# Patient Record
Sex: MALE | Race: BLACK OR AFRICAN AMERICAN | Employment: OTHER | URBAN - METROPOLITAN AREA
[De-identification: names, ages, dates, MRNs, and addresses within clinical notes are randomized per-mention and may not be internally consistent; named-entity substitution may affect disease eponyms.]

---

## 2021-11-17 ENCOUNTER — HOSPITAL ENCOUNTER (INPATIENT)
Age: 81
LOS: 6 days | Discharge: HOME | DRG: 329 | End: 2021-11-24
Attending: GENERAL PRACTICE | Admitting: SURGERY
Payer: MEDICARE

## 2021-11-17 ENCOUNTER — APPOINTMENT (OUTPATIENT)
Dept: CT IMAGING | Age: 81
DRG: 329 | End: 2021-11-17
Payer: MEDICARE

## 2021-11-17 DIAGNOSIS — K26.5 DUODENAL ULCER PERFORATION (HCC): Primary | ICD-10-CM

## 2021-11-17 LAB
ALBUMIN SERPL-MCNC: 4.4 G/DL (ref 3.5–5.2)
ALP BLD-CCNC: 44 U/L (ref 40–129)
ALT SERPL-CCNC: 21 U/L (ref 0–40)
ANION GAP SERPL CALCULATED.3IONS-SCNC: 17 MMOL/L (ref 7–16)
AST SERPL-CCNC: 22 U/L (ref 0–39)
BACTERIA: ABNORMAL /HPF
BASOPHILS ABSOLUTE: 0.01 E9/L (ref 0–0.2)
BASOPHILS RELATIVE PERCENT: 0.1 % (ref 0–2)
BILIRUB SERPL-MCNC: 0.6 MG/DL (ref 0–1.2)
BILIRUBIN DIRECT: 0.2 MG/DL (ref 0–0.3)
BILIRUBIN URINE: NEGATIVE
BILIRUBIN, INDIRECT: 0.4 MG/DL (ref 0–1)
BLOOD, URINE: ABNORMAL
BUN BLDV-MCNC: 24 MG/DL (ref 6–23)
CALCIUM SERPL-MCNC: 9.8 MG/DL (ref 8.6–10.2)
CHLORIDE BLD-SCNC: 91 MMOL/L (ref 98–107)
CLARITY: CLEAR
CO2: 27 MMOL/L (ref 22–29)
COLOR: YELLOW
CREAT SERPL-MCNC: 1.3 MG/DL (ref 0.7–1.2)
EOSINOPHILS ABSOLUTE: 0 E9/L (ref 0.05–0.5)
EOSINOPHILS RELATIVE PERCENT: 0 % (ref 0–6)
GFR AFRICAN AMERICAN: >60
GFR NON-AFRICAN AMERICAN: >60 ML/MIN/1.73
GLUCOSE BLD-MCNC: 347 MG/DL (ref 74–99)
GLUCOSE URINE: 250 MG/DL
HCT VFR BLD CALC: 39 % (ref 37–54)
HEMOGLOBIN: 12.3 G/DL (ref 12.5–16.5)
IMMATURE GRANULOCYTES #: 0.05 E9/L
IMMATURE GRANULOCYTES %: 0.4 % (ref 0–5)
KETONES, URINE: NEGATIVE MG/DL
LACTIC ACID, SEPSIS: 2.9 MMOL/L (ref 0.5–1.9)
LACTIC ACID, SEPSIS: 3 MMOL/L (ref 0.5–1.9)
LEUKOCYTE ESTERASE, URINE: NEGATIVE
LIPASE: 16 U/L (ref 13–60)
LYMPHOCYTES ABSOLUTE: 0.6 E9/L (ref 1.5–4)
LYMPHOCYTES RELATIVE PERCENT: 5.1 % (ref 20–42)
MCH RBC QN AUTO: 22.9 PG (ref 26–35)
MCHC RBC AUTO-ENTMCNC: 31.5 % (ref 32–34.5)
MCV RBC AUTO: 72.5 FL (ref 80–99.9)
MONOCYTES ABSOLUTE: 1 E9/L (ref 0.1–0.95)
MONOCYTES RELATIVE PERCENT: 8.4 % (ref 2–12)
NEUTROPHILS ABSOLUTE: 10.22 E9/L (ref 1.8–7.3)
NEUTROPHILS RELATIVE PERCENT: 86 % (ref 43–80)
NITRITE, URINE: NEGATIVE
PDW BLD-RTO: 14.7 FL (ref 11.5–15)
PH UA: 5 (ref 5–9)
PLATELET # BLD: 220 E9/L (ref 130–450)
PMV BLD AUTO: 10 FL (ref 7–12)
POTASSIUM REFLEX MAGNESIUM: 4.4 MMOL/L (ref 3.5–5)
PROTEIN UA: 100 MG/DL
RBC # BLD: 5.38 E12/L (ref 3.8–5.8)
RBC UA: ABNORMAL /HPF (ref 0–2)
SODIUM BLD-SCNC: 135 MMOL/L (ref 132–146)
SPECIFIC GRAVITY UA: 1.02 (ref 1–1.03)
TOTAL PROTEIN: 7.8 G/DL (ref 6.4–8.3)
UROBILINOGEN, URINE: 0.2 E.U./DL
WBC # BLD: 11.9 E9/L (ref 4.5–11.5)
WBC UA: ABNORMAL /HPF (ref 0–5)

## 2021-11-17 PROCEDURE — 96376 TX/PRO/DX INJ SAME DRUG ADON: CPT

## 2021-11-17 PROCEDURE — 83690 ASSAY OF LIPASE: CPT

## 2021-11-17 PROCEDURE — 81001 URINALYSIS AUTO W/SCOPE: CPT

## 2021-11-17 PROCEDURE — 6360000002 HC RX W HCPCS: Performed by: GENERAL PRACTICE

## 2021-11-17 PROCEDURE — 2580000003 HC RX 258: Performed by: STUDENT IN AN ORGANIZED HEALTH CARE EDUCATION/TRAINING PROGRAM

## 2021-11-17 PROCEDURE — 96365 THER/PROPH/DIAG IV INF INIT: CPT

## 2021-11-17 PROCEDURE — 93005 ELECTROCARDIOGRAM TRACING: CPT | Performed by: GENERAL PRACTICE

## 2021-11-17 PROCEDURE — 80076 HEPATIC FUNCTION PANEL: CPT

## 2021-11-17 PROCEDURE — 87088 URINE BACTERIA CULTURE: CPT

## 2021-11-17 PROCEDURE — 80048 BASIC METABOLIC PNL TOTAL CA: CPT

## 2021-11-17 PROCEDURE — 2500000003 HC RX 250 WO HCPCS: Performed by: GENERAL PRACTICE

## 2021-11-17 PROCEDURE — 6360000004 HC RX CONTRAST MEDICATION: Performed by: STUDENT IN AN ORGANIZED HEALTH CARE EDUCATION/TRAINING PROGRAM

## 2021-11-17 PROCEDURE — 96366 THER/PROPH/DIAG IV INF ADDON: CPT

## 2021-11-17 PROCEDURE — 2580000003 HC RX 258: Performed by: GENERAL PRACTICE

## 2021-11-17 PROCEDURE — 85025 COMPLETE CBC W/AUTO DIFF WBC: CPT

## 2021-11-17 PROCEDURE — 99284 EMERGENCY DEPT VISIT MOD MDM: CPT

## 2021-11-17 PROCEDURE — 36415 COLL VENOUS BLD VENIPUNCTURE: CPT

## 2021-11-17 PROCEDURE — 96368 THER/DIAG CONCURRENT INF: CPT

## 2021-11-17 PROCEDURE — 74177 CT ABD & PELVIS W/CONTRAST: CPT

## 2021-11-17 PROCEDURE — 96375 TX/PRO/DX INJ NEW DRUG ADDON: CPT

## 2021-11-17 PROCEDURE — 83605 ASSAY OF LACTIC ACID: CPT

## 2021-11-17 RX ORDER — AMLODIPINE BESYLATE 10 MG/1
10 TABLET ORAL DAILY
COMMUNITY
End: 2022-01-11

## 2021-11-17 RX ORDER — OXYMETAZOLINE HYDROCHLORIDE 0.05 G/100ML
2 SPRAY NASAL ONCE
Status: DISCONTINUED | OUTPATIENT
Start: 2021-11-17 | End: 2021-11-18

## 2021-11-17 RX ORDER — SODIUM CHLORIDE 0.9 % (FLUSH) 0.9 %
10 SYRINGE (ML) INJECTION PRN
Status: COMPLETED | OUTPATIENT
Start: 2021-11-17 | End: 2021-11-17

## 2021-11-17 RX ORDER — BENAZEPRIL/HYDROCHLOROTHIAZIDE 20 MG-25MG
1 TABLET ORAL DAILY
COMMUNITY
End: 2022-01-11

## 2021-11-17 RX ORDER — FLUCONAZOLE 2 MG/ML
400 INJECTION, SOLUTION INTRAVENOUS EVERY 24 HOURS
Status: COMPLETED | OUTPATIENT
Start: 2021-11-18 | End: 2021-11-22

## 2021-11-17 RX ORDER — FLUCONAZOLE 2 MG/ML
800 INJECTION, SOLUTION INTRAVENOUS ONCE
Status: DISCONTINUED | OUTPATIENT
Start: 2021-11-17 | End: 2021-11-23

## 2021-11-17 RX ORDER — FENTANYL CITRATE 50 UG/ML
50 INJECTION, SOLUTION INTRAMUSCULAR; INTRAVENOUS ONCE
Status: COMPLETED | OUTPATIENT
Start: 2021-11-17 | End: 2021-11-17

## 2021-11-17 RX ORDER — KETOROLAC TROMETHAMINE 30 MG/ML
15 INJECTION, SOLUTION INTRAMUSCULAR; INTRAVENOUS ONCE
Status: COMPLETED | OUTPATIENT
Start: 2021-11-17 | End: 2021-11-17

## 2021-11-17 RX ORDER — ONDANSETRON 2 MG/ML
4 INJECTION INTRAMUSCULAR; INTRAVENOUS EVERY 6 HOURS PRN
Status: DISCONTINUED | OUTPATIENT
Start: 2021-11-17 | End: 2021-11-24 | Stop reason: HOSPADM

## 2021-11-17 RX ORDER — LIDOCAINE HYDROCHLORIDE 20 MG/ML
JELLY TOPICAL ONCE
Status: DISCONTINUED | OUTPATIENT
Start: 2021-11-17 | End: 2021-11-18

## 2021-11-17 RX ORDER — METOPROLOL SUCCINATE 50 MG/1
50 TABLET, EXTENDED RELEASE ORAL DAILY
COMMUNITY
End: 2022-01-11 | Stop reason: SDUPTHER

## 2021-11-17 RX ORDER — SODIUM CHLORIDE 9 MG/ML
1000 INJECTION, SOLUTION INTRAVENOUS CONTINUOUS
Status: DISCONTINUED | OUTPATIENT
Start: 2021-11-17 | End: 2021-11-18

## 2021-11-17 RX ORDER — FENTANYL CITRATE 50 UG/ML
75 INJECTION, SOLUTION INTRAMUSCULAR; INTRAVENOUS ONCE
Status: COMPLETED | OUTPATIENT
Start: 2021-11-17 | End: 2021-11-17

## 2021-11-17 RX ORDER — GLIMEPIRIDE 4 MG/1
4 TABLET ORAL
Status: ON HOLD | COMMUNITY
End: 2021-12-13

## 2021-11-17 RX ORDER — PRAVASTATIN SODIUM 40 MG
40 TABLET ORAL DAILY
COMMUNITY
End: 2022-01-11 | Stop reason: SDUPTHER

## 2021-11-17 RX ORDER — 0.9 % SODIUM CHLORIDE 0.9 %
1000 INTRAVENOUS SOLUTION INTRAVENOUS ONCE
Status: COMPLETED | OUTPATIENT
Start: 2021-11-17 | End: 2021-11-17

## 2021-11-17 RX ADMIN — FENTANYL CITRATE 50 MCG: 50 INJECTION, SOLUTION INTRAMUSCULAR; INTRAVENOUS at 16:42

## 2021-11-17 RX ADMIN — WATER 1000 MG: 1 INJECTION INTRAMUSCULAR; INTRAVENOUS; SUBCUTANEOUS at 18:02

## 2021-11-17 RX ADMIN — ONDANSETRON 4 MG: 2 INJECTION INTRAMUSCULAR; INTRAVENOUS at 15:21

## 2021-11-17 RX ADMIN — METRONIDAZOLE 500 MG: 500 INJECTION, SOLUTION INTRAVENOUS at 18:11

## 2021-11-17 RX ADMIN — SODIUM CHLORIDE 1000 ML: 9 INJECTION, SOLUTION INTRAVENOUS at 17:52

## 2021-11-17 RX ADMIN — IOPAMIDOL 75 ML: 755 INJECTION, SOLUTION INTRAVENOUS at 16:51

## 2021-11-17 RX ADMIN — FENTANYL CITRATE 75 MCG: 50 INJECTION, SOLUTION INTRAMUSCULAR; INTRAVENOUS at 18:02

## 2021-11-17 RX ADMIN — FENTANYL CITRATE 50 MCG: 0.05 INJECTION, SOLUTION INTRAMUSCULAR; INTRAVENOUS at 20:08

## 2021-11-17 RX ADMIN — SODIUM CHLORIDE 1000 ML: 9 INJECTION, SOLUTION INTRAVENOUS at 15:25

## 2021-11-17 RX ADMIN — KETOROLAC TROMETHAMINE 15 MG: 30 INJECTION, SOLUTION INTRAMUSCULAR at 15:28

## 2021-11-17 RX ADMIN — Medication 10 ML: at 16:43

## 2021-11-17 ASSESSMENT — ENCOUNTER SYMPTOMS
VOMITING: 0
NAUSEA: 1
ABDOMINAL PAIN: 1
SHORTNESS OF BREATH: 0
WHEEZING: 0
EYE DISCHARGE: 0
BACK PAIN: 0
EYE REDNESS: 0
SORE THROAT: 0
EYE PAIN: 0
COUGH: 0
DIARRHEA: 0
SINUS PRESSURE: 0

## 2021-11-17 ASSESSMENT — PAIN DESCRIPTION - PAIN TYPE: TYPE: ACUTE PAIN

## 2021-11-17 ASSESSMENT — PAIN DESCRIPTION - PROGRESSION: CLINICAL_PROGRESSION: GRADUALLY WORSENING

## 2021-11-17 ASSESSMENT — PAIN SCALES - GENERAL
PAINLEVEL_OUTOF10: 9
PAINLEVEL_OUTOF10: 8
PAINLEVEL_OUTOF10: 9
PAINLEVEL_OUTOF10: 8
PAINLEVEL_OUTOF10: 9

## 2021-11-17 ASSESSMENT — PAIN DESCRIPTION - DESCRIPTORS: DESCRIPTORS: SHARP

## 2021-11-17 ASSESSMENT — PAIN DESCRIPTION - LOCATION: LOCATION: ABDOMEN

## 2021-11-17 ASSESSMENT — PAIN DESCRIPTION - FREQUENCY: FREQUENCY: INTERMITTENT

## 2021-11-17 ASSESSMENT — PAIN DESCRIPTION - ORIENTATION: ORIENTATION: RIGHT;UPPER

## 2021-11-17 ASSESSMENT — PAIN DESCRIPTION - ONSET: ONSET: ON-GOING

## 2021-11-17 NOTE — ED PROVIDER NOTES
ED  Provider Note  Admit Date/RoomTime: 11/17/2021  2:37 PM  ED Room: 02/02     HPI:   Amber Brantley is a 80 y.o. male presenting to the ED for abdominal pain, beginning 4 days ago. History comes primarily from the patient. Past medical history includes none contributory. The complaint has been persistent, moderate in severity, improved by nothing and worsened by nothing. Associated symptoms include decreased appetite. Tyesha Christensen states that several days ago he ate some chicken as well as potato salad and ever since that time he has had right upper quadrant abdominal pain that has caused him to be persistently nauseous. This nausea has made him feel the need to force himself to gag, and he has been essentially p.o. intolerant during this time. He was seen in an urgent care for the symptoms, however due to the persistence of the symptoms he presented to [facility] emergency department for further evaluation and treatment. On arrival, the patient was assessed with history, physical exam, imaging studies, laboratory studies and ekg, vital signs. Vital signs were stable on arrival and the patient was afebrile. Review of Systems   Constitutional: Positive for activity change and appetite change. Negative for chills and fever. HENT: Negative for ear pain, sinus pressure and sore throat. Eyes: Negative for pain, discharge and redness. Respiratory: Negative for cough, shortness of breath and wheezing. Cardiovascular: Negative for chest pain. Gastrointestinal: Positive for abdominal pain and nausea. Negative for diarrhea and vomiting. Genitourinary: Negative for dysuria and frequency. Musculoskeletal: Negative for arthralgias and back pain. Skin: Negative for rash and wound. Neurological: Negative for weakness and headaches. Hematological: Negative for adenopathy. All other systems reviewed and are negative.        Physical Exam  Constitutional:       General: He is not in acute distress. Appearance: He is well-developed. He is not diaphoretic. HENT:      Head: Normocephalic and atraumatic. Mouth/Throat:      Dentition: Abnormal dentition. Eyes:      Pupils: Pupils are equal, round, and reactive to light. Neck:      Vascular: No JVD. Trachea: No tracheal deviation. Cardiovascular:      Rate and Rhythm: Regular rhythm. Heart sounds: No murmur heard. No friction rub. No gallop. Pulmonary:      Effort: Pulmonary effort is normal. No respiratory distress. Breath sounds: Normal breath sounds. No stridor. No wheezing or rales. Chest:      Chest wall: No tenderness. Abdominal:      General: Bowel sounds are normal. There is no distension. Palpations: Abdomen is soft. Tenderness: There is abdominal tenderness in the right upper quadrant and epigastric area. There is no guarding. Positive signs include Potter's sign. Comments: Abdominal exam is taut with evidence of peritonitis   Musculoskeletal:         General: Normal range of motion. Cervical back: Normal range of motion. Skin:     General: Skin is warm and dry. Neurological:      Mental Status: He is alert. Cranial Nerves: No cranial nerve deficit. Psychiatric:         Behavior: Behavior normal.          EKG interpretation  Rate 89  Sinus rhythm  Normal axis  First-degree AV block  No QRS, QT prolongations  No ST segment elevations or depressions  Occasional PVCs noted    Procedures     MDM  Number of Diagnoses or Management Options  Diagnosis management comments: Emergency department evaluation was notable for finding of duodenal ulcer perforation with free air in the abdominal cavity on CT scan. This will require operative repair, and the patient was discussed with Dr. Cody Williamson of general surgery in regards to a perforated duodenal ulcer identified the patient CT scan. She agreed to see the patient in the emergency department at [facility]. -------------------------------------------------    Lab  Results for orders placed or performed during the hospital encounter of 11/17/21   CBC Auto Differential   Result Value Ref Range    WBC 11.9 (H) 4.5 - 11.5 E9/L    RBC 5.38 3.80 - 5.80 E12/L    Hemoglobin 12.3 (L) 12.5 - 16.5 g/dL    Hematocrit 39.0 37.0 - 54.0 %    MCV 72.5 (L) 80.0 - 99.9 fL    MCH 22.9 (L) 26.0 - 35.0 pg    MCHC 31.5 (L) 32.0 - 34.5 %    RDW 14.7 11.5 - 15.0 fL    Platelets 707 682 - 477 E9/L    MPV 10.0 7.0 - 12.0 fL    Neutrophils % 86.0 (H) 43.0 - 80.0 %    Immature Granulocytes % 0.4 0.0 - 5.0 %    Lymphocytes % 5.1 (L) 20.0 - 42.0 %    Monocytes % 8.4 2.0 - 12.0 %    Eosinophils % 0.0 0.0 - 6.0 %    Basophils % 0.1 0.0 - 2.0 %    Neutrophils Absolute 10.22 (H) 1.80 - 7.30 E9/L    Immature Granulocytes # 0.05 E9/L    Lymphocytes Absolute 0.60 (L) 1.50 - 4.00 E9/L    Monocytes Absolute 1.00 (H) 0.10 - 0.95 E9/L    Eosinophils Absolute 0.00 (L) 0.05 - 0.50 E9/L    Basophils Absolute 0.01 0.00 - 0.20 N0/T   Basic Metabolic Panel w/ Reflex to MG   Result Value Ref Range    Sodium 135 132 - 146 mmol/L    Potassium reflex Magnesium 4.4 3.5 - 5.0 mmol/L    Chloride 91 (L) 98 - 107 mmol/L    CO2 27 22 - 29 mmol/L    Anion Gap 17 (H) 7 - 16 mmol/L    Glucose 347 (H) 74 - 99 mg/dL    BUN 24 (H) 6 - 23 mg/dL    CREATININE 1.3 (H) 0.7 - 1.2 mg/dL    GFR Non-African American >60 >=60 mL/min/1.73    GFR African American >60     Calcium 9.8 8.6 - 10.2 mg/dL   Hepatic Function Panel   Result Value Ref Range    Total Protein 7.8 6.4 - 8.3 g/dL    Albumin 4.4 3.5 - 5.2 g/dL    Alkaline Phosphatase 44 40 - 129 U/L    ALT 21 0 - 40 U/L    AST 22 0 - 39 U/L    Total Bilirubin 0.6 0.0 - 1.2 mg/dL    Bilirubin, Direct 0.2 0.0 - 0.3 mg/dL    Bilirubin, Indirect 0.4 0.0 - 1.0 mg/dL   Lactate, Sepsis   Result Value Ref Range    Lactic Acid, Sepsis 2.9 (H) 0.5 - 1.9 mmol/L   Lactate, Sepsis   Result Value Ref Range    Lactic Acid, Sepsis 3.0 (H) 0.5 - 1.9 mmol/L   Lipase   Result Value Ref Range    Lipase 16 13 - 60 U/L   EKG 12 Lead   Result Value Ref Range    Ventricular Rate 89 BPM    Atrial Rate 89 BPM    P-R Interval 216 ms    QRS Duration 90 ms    Q-T Interval 364 ms    QTc Calculation (Bazett) 442 ms    P Axis 16 degrees    R Axis 1 degrees    T Axis 19 degrees       Radiology  CT ABDOMEN PELVIS W IV CONTRAST Additional Contrast? None   Final Result   Thickening of the duodenal bulb with evidence of free air in the anterior   upper abdomen. Findings consistent with duodenitis/peptic ulcer disease with   perforation. Findings discussed on 11/17/2021 with Dr. Rosalia Presley at 5:38 p.m. .      10.0 cm cyst right upper lobe. Mild prostatomegaly. ------------------------- NURSING NOTES AND VITALS REVIEWED ---------------------------  Date / Time Roomed:  11/17/2021  2:37 PM  ED Bed Assignment:  02/02    The nursing notes within the ED encounter and vital signs as below have been reviewed. Patient Vitals for the past 24 hrs:   BP Temp Temp src Pulse Resp SpO2 Height Weight   11/17/21 1947 (!) 128/58 98.4 °F (36.9 °C) Oral 98 16 -- -- --   11/17/21 1802 (!) 109/53 98.6 °F (37 °C) Oral 87 18 98 % -- --   11/17/21 1431 -- -- -- -- -- -- 5' 10\" (1.778 m) 235 lb (106.6 kg)   11/17/21 1359 136/60 96.4 °F (35.8 °C) -- 64 16 97 % -- --       Oxygen Saturation Interpretation: Normal      ------------------------------------------ PROGRESS NOTES ------------------------------------------  Re-evaluation(s):  Time: 8:02 PM EST. Patients symptoms show no change  Repeat physical examination is not changed    I have spoken with the patient and discussed todays results, in addition to providing specific details for the plan of care and counseling regarding the diagnosis and prognosis. Their questions are answered at this time and they are agreeable with the plan.   I have discussed the risks and benefits of transfer and they wish to proceed with the transfer. --------------------------------- ADDITIONAL PROVIDER NOTES ---------------------------------  Consultations:  Spoke with Dr. Mj Connors (Surgery). Discussed case. They will come to the ED to evaluate this patient. Reason for transfer: need for higher level of care. This patient's ED course included: a personal history and physicial examination, re-evaluation prior to disposition, multiple bedside re-evaluations and IV medications    This patient has remained hemodynamically stable, improved and been closely monitored during their ED course. Please note that the withdrawal or failure to initiate urgent interventions for this patient would likely result in a life threatening deterioration or permanent disability. Clinical Impression  1. [facility]          Disposition  Patient's disposition: Transfer to [facility] ED. Transferred by: ALS. Patient's condition is fair.        Livan Matthew 45., DO  Resident  11/17/21 2023

## 2021-11-18 ENCOUNTER — APPOINTMENT (OUTPATIENT)
Dept: GENERAL RADIOLOGY | Age: 81
DRG: 329 | End: 2021-11-18
Payer: MEDICARE

## 2021-11-18 ENCOUNTER — APPOINTMENT (OUTPATIENT)
Dept: CT IMAGING | Age: 81
DRG: 329 | End: 2021-11-18
Payer: MEDICARE

## 2021-11-18 ENCOUNTER — ANESTHESIA EVENT (OUTPATIENT)
Dept: OPERATING ROOM | Age: 81
DRG: 329 | End: 2021-11-18
Payer: MEDICARE

## 2021-11-18 ENCOUNTER — ANESTHESIA (OUTPATIENT)
Dept: OPERATING ROOM | Age: 81
DRG: 329 | End: 2021-11-18
Payer: MEDICARE

## 2021-11-18 VITALS — SYSTOLIC BLOOD PRESSURE: 95 MMHG | OXYGEN SATURATION: 95 % | DIASTOLIC BLOOD PRESSURE: 63 MMHG | TEMPERATURE: 97 F

## 2021-11-18 PROBLEM — R41.0 DELIRIUM: Status: ACTIVE | Noted: 2021-11-18

## 2021-11-18 PROBLEM — K26.9 DUODENAL ULCER, UNSP AS ACUTE OR CHRONIC, W/O HEMOR OR PERF: Status: ACTIVE | Noted: 2021-11-18

## 2021-11-18 PROBLEM — R10.9 ABDOMINAL PAIN: Status: ACTIVE | Noted: 2021-11-18

## 2021-11-18 PROBLEM — E11.65 TYPE 2 DIABETES MELLITUS WITH HYPERGLYCEMIA, WITHOUT LONG-TERM CURRENT USE OF INSULIN (HCC): Status: ACTIVE | Noted: 2021-11-18

## 2021-11-18 PROBLEM — K26.1 ACUTE DUODENAL ULCER WITH PERFORATION (HCC): Status: ACTIVE | Noted: 2021-11-18

## 2021-11-18 PROBLEM — N28.9 ACUTE RENAL DISEASE: Status: ACTIVE | Noted: 2021-11-18

## 2021-11-18 LAB
ALBUMIN SERPL-MCNC: 3.7 G/DL (ref 3.5–5.2)
ALP BLD-CCNC: 25 U/L (ref 40–129)
ALT SERPL-CCNC: 42 U/L (ref 0–40)
ANION GAP SERPL CALCULATED.3IONS-SCNC: 14 MMOL/L (ref 7–16)
ANION GAP: 11 MMOL/L (ref 7–16)
ANION GAP: 12 MMOL/L (ref 7–16)
ANION GAP: 8 MMOL/L (ref 7–16)
ANISOCYTOSIS: ABNORMAL
AST SERPL-CCNC: 48 U/L (ref 0–39)
B.E.: -3.2 MMOL/L (ref -3–0)
B.E.: -3.4 MMOL/L (ref -3–0)
B.E.: -3.6 MMOL/L (ref -3–0)
BASOPHILS ABSOLUTE: 0 E9/L (ref 0–0.2)
BASOPHILS RELATIVE PERCENT: 0 % (ref 0–2)
BILIRUB SERPL-MCNC: 0.7 MG/DL (ref 0–1.2)
BUN BLDV-MCNC: 30 MG/DL (ref 6–23)
CALCIUM SERPL-MCNC: 7.8 MG/DL (ref 8.6–10.2)
CARDIOPULMONARY BYPASS: NO
CHLORIDE BLD-SCNC: 104 MMOL/L (ref 98–107)
CO2: 20 MMOL/L (ref 22–29)
CREAT SERPL-MCNC: 1.4 MG/DL (ref 0.7–1.2)
DEVICE: ABNORMAL
EKG ATRIAL RATE: 89 BPM
EKG P AXIS: 16 DEGREES
EKG P-R INTERVAL: 216 MS
EKG Q-T INTERVAL: 364 MS
EKG QRS DURATION: 90 MS
EKG QTC CALCULATION (BAZETT): 442 MS
EKG R AXIS: 1 DEGREES
EKG T AXIS: 19 DEGREES
EKG VENTRICULAR RATE: 89 BPM
EOSINOPHILS ABSOLUTE: 0 E9/L (ref 0.05–0.5)
EOSINOPHILS RELATIVE PERCENT: 0 % (ref 0–6)
GFR AFRICAN AMERICAN: 55
GFR AFRICAN AMERICAN: 59
GFR AFRICAN AMERICAN: 59
GFR AFRICAN AMERICAN: >60
GFR NON-AFRICAN AMERICAN: 59 ML/MIN/1.73
GFR, ESTIMATED: 45 ML/MIN/1.73
GFR, ESTIMATED: 49 ML/MIN/1.73
GFR, ESTIMATED: >60 ML/MIN/1.73
GLUCOSE BLD-MCNC: 187 MG/DL (ref 74–99)
GLUCOSE BLD-MCNC: 199 MG/DL (ref 74–99)
GLUCOSE BLD-MCNC: 204 MG/DL (ref 74–99)
GLUCOSE BLD-MCNC: 255 MG/DL (ref 74–99)
HBA1C MFR BLD: 5.5 % (ref 4–5.6)
HCO3 ARTERIAL: 21.2 MMOL/L (ref 22–26)
HCO3 ARTERIAL: 21.9 MMOL/L (ref 22–26)
HCO3 ARTERIAL: 22.3 MMOL/L (ref 22–26)
HCT (EST): 33 % (ref 37–54)
HCT (EST): 34 % (ref 37–54)
HCT (EST): 35 % (ref 37–54)
HCT VFR BLD CALC: 33.4 % (ref 37–54)
HCT VFR BLD CALC: 34.1 % (ref 37–54)
HEMOGLOBIN: 10.5 G/DL (ref 12.5–16.5)
HEMOGLOBIN: 10.5 G/DL (ref 12.5–16.5)
HGB, (EST): 11.1 G/DL (ref 12.5–15.5)
HGB, (EST): 11.7 G/DL (ref 12.5–15.5)
HGB, (EST): 12 G/DL (ref 12.5–15.5)
LACTIC ACID: 2.2 MMOL/L (ref 0.5–2.2)
LACTIC ACID: 4 MMOL/L (ref 0.5–2.2)
LYMPHOCYTES ABSOLUTE: 0.63 E9/L (ref 1.5–4)
LYMPHOCYTES RELATIVE PERCENT: 9.6 % (ref 20–42)
MAGNESIUM: 2 MG/DL (ref 1.6–2.6)
MCH RBC QN AUTO: 22.7 PG (ref 26–35)
MCH RBC QN AUTO: 23 PG (ref 26–35)
MCHC RBC AUTO-ENTMCNC: 30.8 % (ref 32–34.5)
MCHC RBC AUTO-ENTMCNC: 31.4 % (ref 32–34.5)
MCV RBC AUTO: 72.3 FL (ref 80–99.9)
MCV RBC AUTO: 74.6 FL (ref 80–99.9)
METAMYELOCYTES RELATIVE PERCENT: 0.9 % (ref 0–1)
METER GLUCOSE: 119 MG/DL (ref 74–99)
METER GLUCOSE: 130 MG/DL (ref 74–99)
METER GLUCOSE: 91 MG/DL (ref 74–99)
MONOCYTES ABSOLUTE: 0.5 E9/L (ref 0.1–0.95)
MONOCYTES RELATIVE PERCENT: 7.8 % (ref 2–12)
NEUTROPHILS ABSOLUTE: 5.23 E9/L (ref 1.8–7.3)
NEUTROPHILS RELATIVE PERCENT: 81.7 % (ref 43–80)
O2 SATURATION: 98.1 % (ref 92–98.5)
O2 SATURATION: 98.4 % (ref 92–98.5)
O2 SATURATION: 98.8 % (ref 92–98.5)
OPERATOR ID: ABNORMAL
OVALOCYTES: ABNORMAL
PATIENT TEMP: 36
PCO2 (TEMP CORRECTED): 38.4 MMHG (ref 35–45)
PCO2 ARTERIAL: 34.8 MMHG (ref 35–45)
PCO2 ARTERIAL: 41.6 MMHG (ref 35–45)
PDW BLD-RTO: 14.9 FL (ref 11.5–15)
PDW BLD-RTO: 15 FL (ref 11.5–15)
PH (TEMPERATURE CORRECTED): 7.36 (ref 7.35–7.45)
PH BLOOD GAS: 7.34 (ref 7.35–7.45)
PH BLOOD GAS: 7.39 (ref 7.35–7.45)
PHOSPHORUS: 3.9 MG/DL (ref 2.5–4.5)
PLATELET # BLD: 170 E9/L (ref 130–450)
PLATELET # BLD: 173 E9/L (ref 130–450)
PMV BLD AUTO: 10 FL (ref 7–12)
PMV BLD AUTO: 10.3 FL (ref 7–12)
PO2 (TEMP CORRECTED): 125.1 MMHG (ref 60–80)
PO2 ARTERIAL: 105.7 MMHG (ref 60–80)
PO2 ARTERIAL: 119.4 MMHG (ref 60–80)
POC BUN: 26 MG/DL (ref 8–23)
POC BUN: 29 MG/DL (ref 8–23)
POC BUN: 29 MG/DL (ref 8–23)
POC CHLORIDE: 103 MMOL/L (ref 100–108)
POC CHLORIDE: 105 MMOL/L (ref 100–108)
POC CHLORIDE: 109 MMOL/L (ref 100–108)
POC CO2: 21.9 MMOL/L (ref 22–29)
POC CO2: 22.7 MMOL/L (ref 22–29)
POC CO2: 23.1 MMOL/L (ref 22–29)
POC CREATININE: 1.1 MG/DL (ref 0.7–1.2)
POC CREATININE: 1.4 MG/DL (ref 0.7–1.2)
POC CREATININE: 1.5 MG/DL (ref 0.7–1.2)
POC IONIZED CALCIUM: 1.1
POC LACTIC ACID: 1.9
POC LACTIC ACID: 2.3
POC LACTIC ACID: 3
POC SODIUM: 137 MMOL/L (ref 132–146)
POC SODIUM: 139 MMOL/L (ref 132–146)
POC SODIUM: 140 MMOL/L (ref 132–146)
POIKILOCYTES: ABNORMAL
POLYCHROMASIA: ABNORMAL
POTASSIUM SERPL-SCNC: 3.6 MMOL/L (ref 3.5–5.5)
POTASSIUM SERPL-SCNC: 3.7 MMOL/L (ref 3.5–5.5)
POTASSIUM SERPL-SCNC: 3.8 MMOL/L (ref 3.5–5.5)
POTASSIUM SERPL-SCNC: 4.1 MMOL/L (ref 3.5–5)
RBC # BLD: 4.57 E12/L (ref 3.8–5.8)
RBC # BLD: 4.62 E12/L (ref 3.8–5.8)
SARS-COV-2, NAAT: NOT DETECTED
SODIUM BLD-SCNC: 138 MMOL/L (ref 132–146)
SOURCE, BLOOD GAS: ABNORMAL
TOTAL PROTEIN: 6.1 G/DL (ref 6.4–8.3)
WBC # BLD: 6.3 E9/L (ref 4.5–11.5)
WBC # BLD: 7.6 E9/L (ref 4.5–11.5)

## 2021-11-18 PROCEDURE — 86677 HELICOBACTER PYLORI ANTIBODY: CPT

## 2021-11-18 PROCEDURE — 7100000001 HC PACU RECOVERY - ADDTL 15 MIN

## 2021-11-18 PROCEDURE — 82962 GLUCOSE BLOOD TEST: CPT

## 2021-11-18 PROCEDURE — 2720000010 HC SURG SUPPLY STERILE: Performed by: SURGERY

## 2021-11-18 PROCEDURE — 2500000003 HC RX 250 WO HCPCS: Performed by: NURSE ANESTHETIST, CERTIFIED REGISTERED

## 2021-11-18 PROCEDURE — 6360000002 HC RX W HCPCS: Performed by: NURSE ANESTHETIST, CERTIFIED REGISTERED

## 2021-11-18 PROCEDURE — 43659 UNLISTED LAPS PX STOMACH: CPT | Performed by: SURGERY

## 2021-11-18 PROCEDURE — 6360000002 HC RX W HCPCS: Performed by: STUDENT IN AN ORGANIZED HEALTH CARE EDUCATION/TRAINING PROGRAM

## 2021-11-18 PROCEDURE — 93010 ELECTROCARDIOGRAM REPORT: CPT | Performed by: INTERNAL MEDICINE

## 2021-11-18 PROCEDURE — 87205 SMEAR GRAM STAIN: CPT

## 2021-11-18 PROCEDURE — 6360000002 HC RX W HCPCS

## 2021-11-18 PROCEDURE — 2500000003 HC RX 250 WO HCPCS: Performed by: STUDENT IN AN ORGANIZED HEALTH CARE EDUCATION/TRAINING PROGRAM

## 2021-11-18 PROCEDURE — 6360000002 HC RX W HCPCS: Performed by: ANESTHESIOLOGY

## 2021-11-18 PROCEDURE — 3600000003 HC SURGERY LEVEL 3 BASE: Performed by: SURGERY

## 2021-11-18 PROCEDURE — 87081 CULTURE SCREEN ONLY: CPT

## 2021-11-18 PROCEDURE — 7100000000 HC PACU RECOVERY - FIRST 15 MIN

## 2021-11-18 PROCEDURE — 99223 1ST HOSP IP/OBS HIGH 75: CPT | Performed by: SURGERY

## 2021-11-18 PROCEDURE — 2000000000 HC ICU R&B

## 2021-11-18 PROCEDURE — 2580000003 HC RX 258: Performed by: STUDENT IN AN ORGANIZED HEALTH CARE EDUCATION/TRAINING PROGRAM

## 2021-11-18 PROCEDURE — 83036 HEMOGLOBIN GLYCOSYLATED A1C: CPT

## 2021-11-18 PROCEDURE — 7100000000 HC PACU RECOVERY - FIRST 15 MIN: Performed by: SURGERY

## 2021-11-18 PROCEDURE — 6370000000 HC RX 637 (ALT 250 FOR IP): Performed by: NURSE ANESTHETIST, CERTIFIED REGISTERED

## 2021-11-18 PROCEDURE — 83605 ASSAY OF LACTIC ACID: CPT

## 2021-11-18 PROCEDURE — 7100000001 HC PACU RECOVERY - ADDTL 15 MIN: Performed by: SURGERY

## 2021-11-18 PROCEDURE — 2500000003 HC RX 250 WO HCPCS

## 2021-11-18 PROCEDURE — 74018 RADEX ABDOMEN 1 VIEW: CPT

## 2021-11-18 PROCEDURE — 36415 COLL VENOUS BLD VENIPUNCTURE: CPT

## 2021-11-18 PROCEDURE — 87102 FUNGUS ISOLATION CULTURE: CPT

## 2021-11-18 PROCEDURE — 3600000013 HC SURGERY LEVEL 3 ADDTL 15MIN: Performed by: SURGERY

## 2021-11-18 PROCEDURE — 87070 CULTURE OTHR SPECIMN AEROBIC: CPT

## 2021-11-18 PROCEDURE — 3700000001 HC ADD 15 MINUTES (ANESTHESIA): Performed by: SURGERY

## 2021-11-18 PROCEDURE — 83735 ASSAY OF MAGNESIUM: CPT

## 2021-11-18 PROCEDURE — 84100 ASSAY OF PHOSPHORUS: CPT

## 2021-11-18 PROCEDURE — 2709999900 HC NON-CHARGEABLE SUPPLY: Performed by: SURGERY

## 2021-11-18 PROCEDURE — 87075 CULTR BACTERIA EXCEPT BLOOD: CPT

## 2021-11-18 PROCEDURE — 74176 CT ABD & PELVIS W/O CONTRAST: CPT

## 2021-11-18 PROCEDURE — 85025 COMPLETE CBC W/AUTO DIFF WBC: CPT

## 2021-11-18 PROCEDURE — 2500000003 HC RX 250 WO HCPCS: Performed by: SURGERY

## 2021-11-18 PROCEDURE — 87635 SARS-COV-2 COVID-19 AMP PRB: CPT

## 2021-11-18 PROCEDURE — 2700000000 HC OXYGEN THERAPY PER DAY

## 2021-11-18 PROCEDURE — P9041 ALBUMIN (HUMAN),5%, 50ML: HCPCS

## 2021-11-18 PROCEDURE — 82803 BLOOD GASES ANY COMBINATION: CPT

## 2021-11-18 PROCEDURE — 2580000003 HC RX 258: Performed by: NURSE ANESTHETIST, CERTIFIED REGISTERED

## 2021-11-18 PROCEDURE — C9113 INJ PANTOPRAZOLE SODIUM, VIA: HCPCS | Performed by: STUDENT IN AN ORGANIZED HEALTH CARE EDUCATION/TRAINING PROGRAM

## 2021-11-18 PROCEDURE — 3700000000 HC ANESTHESIA ATTENDED CARE: Performed by: SURGERY

## 2021-11-18 PROCEDURE — 0DU947Z SUPPLEMENT DUODENUM WITH AUTOLOGOUS TISSUE SUBSTITUTE, PERCUTANEOUS ENDOSCOPIC APPROACH: ICD-10-PCS | Performed by: SURGERY

## 2021-11-18 PROCEDURE — 80053 COMPREHEN METABOLIC PANEL: CPT

## 2021-11-18 PROCEDURE — 85027 COMPLETE CBC AUTOMATED: CPT

## 2021-11-18 PROCEDURE — 99291 CRITICAL CARE FIRST HOUR: CPT | Performed by: SURGERY

## 2021-11-18 RX ORDER — ROCURONIUM BROMIDE 10 MG/ML
INJECTION, SOLUTION INTRAVENOUS PRN
Status: DISCONTINUED | OUTPATIENT
Start: 2021-11-18 | End: 2021-11-18 | Stop reason: SDUPTHER

## 2021-11-18 RX ORDER — ONDANSETRON 2 MG/ML
4 INJECTION INTRAMUSCULAR; INTRAVENOUS
Status: DISCONTINUED | OUTPATIENT
Start: 2021-11-18 | End: 2021-11-18 | Stop reason: HOSPADM

## 2021-11-18 RX ORDER — DEXAMETHASONE SODIUM PHOSPHATE 10 MG/ML
INJECTION INTRAMUSCULAR; INTRAVENOUS PRN
Status: DISCONTINUED | OUTPATIENT
Start: 2021-11-18 | End: 2021-11-18 | Stop reason: SDUPTHER

## 2021-11-18 RX ORDER — NICOTINE POLACRILEX 4 MG
15 LOZENGE BUCCAL PRN
Status: DISCONTINUED | OUTPATIENT
Start: 2021-11-18 | End: 2021-11-22

## 2021-11-18 RX ORDER — DEXTROSE MONOHYDRATE 25 G/50ML
12.5 INJECTION, SOLUTION INTRAVENOUS PRN
Status: DISCONTINUED | OUTPATIENT
Start: 2021-11-18 | End: 2021-11-18 | Stop reason: SDUPTHER

## 2021-11-18 RX ORDER — PANTOPRAZOLE SODIUM 40 MG/10ML
40 INJECTION, POWDER, LYOPHILIZED, FOR SOLUTION INTRAVENOUS 2 TIMES DAILY
Status: DISCONTINUED | OUTPATIENT
Start: 2021-11-18 | End: 2021-11-22

## 2021-11-18 RX ORDER — DEXTROSE MONOHYDRATE 50 MG/ML
100 INJECTION, SOLUTION INTRAVENOUS PRN
Status: DISCONTINUED | OUTPATIENT
Start: 2021-11-18 | End: 2021-11-18 | Stop reason: SDUPTHER

## 2021-11-18 RX ORDER — MIDAZOLAM HYDROCHLORIDE 2 MG/2ML
1 INJECTION, SOLUTION INTRAMUSCULAR; INTRAVENOUS
Status: DISCONTINUED | OUTPATIENT
Start: 2021-11-18 | End: 2021-11-18

## 2021-11-18 RX ORDER — MIDAZOLAM HYDROCHLORIDE 1 MG/ML
INJECTION INTRAMUSCULAR; INTRAVENOUS
Status: COMPLETED
Start: 2021-11-18 | End: 2021-11-18

## 2021-11-18 RX ORDER — AMLODIPINE BESYLATE 5 MG/1
10 TABLET ORAL DAILY
Status: CANCELLED | OUTPATIENT
Start: 2021-11-18

## 2021-11-18 RX ORDER — ONDANSETRON 4 MG/1
4 TABLET, ORALLY DISINTEGRATING ORAL EVERY 8 HOURS PRN
Status: DISCONTINUED | OUTPATIENT
Start: 2021-11-18 | End: 2021-11-24 | Stop reason: HOSPADM

## 2021-11-18 RX ORDER — PROMETHAZINE HYDROCHLORIDE 25 MG/ML
6.25 INJECTION, SOLUTION INTRAMUSCULAR; INTRAVENOUS
Status: DISCONTINUED | OUTPATIENT
Start: 2021-11-18 | End: 2021-11-18 | Stop reason: HOSPADM

## 2021-11-18 RX ORDER — SODIUM CHLORIDE, SODIUM LACTATE, POTASSIUM CHLORIDE, CALCIUM CHLORIDE 600; 310; 30; 20 MG/100ML; MG/100ML; MG/100ML; MG/100ML
INJECTION, SOLUTION INTRAVENOUS CONTINUOUS PRN
Status: DISCONTINUED | OUTPATIENT
Start: 2021-11-18 | End: 2021-11-18 | Stop reason: SDUPTHER

## 2021-11-18 RX ORDER — PROPOFOL 10 MG/ML
INJECTION, EMULSION INTRAVENOUS PRN
Status: DISCONTINUED | OUTPATIENT
Start: 2021-11-18 | End: 2021-11-18 | Stop reason: SDUPTHER

## 2021-11-18 RX ORDER — LABETALOL HYDROCHLORIDE 5 MG/ML
10 INJECTION, SOLUTION INTRAVENOUS
Status: DISCONTINUED | OUTPATIENT
Start: 2021-11-18 | End: 2021-11-24 | Stop reason: HOSPADM

## 2021-11-18 RX ORDER — DEXTROSE MONOHYDRATE 50 MG/ML
100 INJECTION, SOLUTION INTRAVENOUS PRN
Status: DISCONTINUED | OUTPATIENT
Start: 2021-11-18 | End: 2021-11-24 | Stop reason: HOSPADM

## 2021-11-18 RX ORDER — NICOTINE POLACRILEX 4 MG
15 LOZENGE BUCCAL PRN
Status: DISCONTINUED | OUTPATIENT
Start: 2021-11-18 | End: 2021-11-18 | Stop reason: SDUPTHER

## 2021-11-18 RX ORDER — SODIUM CHLORIDE, SODIUM LACTATE, POTASSIUM CHLORIDE, CALCIUM CHLORIDE 600; 310; 30; 20 MG/100ML; MG/100ML; MG/100ML; MG/100ML
1000 INJECTION, SOLUTION INTRAVENOUS ONCE
Status: COMPLETED | OUTPATIENT
Start: 2021-11-18 | End: 2021-11-18

## 2021-11-18 RX ORDER — ONDANSETRON 2 MG/ML
4 INJECTION INTRAMUSCULAR; INTRAVENOUS EVERY 6 HOURS PRN
Status: DISCONTINUED | OUTPATIENT
Start: 2021-11-18 | End: 2021-11-24 | Stop reason: HOSPADM

## 2021-11-18 RX ORDER — PRAVASTATIN SODIUM 20 MG
40 TABLET ORAL DAILY
Status: CANCELLED | OUTPATIENT
Start: 2021-11-18

## 2021-11-18 RX ORDER — FENTANYL CITRATE 50 UG/ML
INJECTION, SOLUTION INTRAMUSCULAR; INTRAVENOUS PRN
Status: DISCONTINUED | OUTPATIENT
Start: 2021-11-18 | End: 2021-11-18 | Stop reason: SDUPTHER

## 2021-11-18 RX ORDER — SODIUM CHLORIDE 0.9 % (FLUSH) 0.9 %
10 SYRINGE (ML) INJECTION EVERY 12 HOURS SCHEDULED
Status: DISCONTINUED | OUTPATIENT
Start: 2021-11-18 | End: 2021-11-24 | Stop reason: HOSPADM

## 2021-11-18 RX ORDER — ONDANSETRON 2 MG/ML
INJECTION INTRAMUSCULAR; INTRAVENOUS PRN
Status: DISCONTINUED | OUTPATIENT
Start: 2021-11-18 | End: 2021-11-18 | Stop reason: SDUPTHER

## 2021-11-18 RX ORDER — HYDRALAZINE HYDROCHLORIDE 20 MG/ML
10 INJECTION INTRAMUSCULAR; INTRAVENOUS
Status: DISCONTINUED | OUTPATIENT
Start: 2021-11-18 | End: 2021-11-24 | Stop reason: HOSPADM

## 2021-11-18 RX ORDER — LORAZEPAM 2 MG/ML
0.5 INJECTION INTRAMUSCULAR ONCE
Status: DISCONTINUED | OUTPATIENT
Start: 2021-11-18 | End: 2021-11-22

## 2021-11-18 RX ORDER — MIDAZOLAM HYDROCHLORIDE 2 MG/2ML
1 INJECTION, SOLUTION INTRAMUSCULAR; INTRAVENOUS
Status: COMPLETED | OUTPATIENT
Start: 2021-11-18 | End: 2021-11-18

## 2021-11-18 RX ORDER — PHENYLEPHRINE HCL IN 0.9% NACL 1 MG/10 ML
SYRINGE (ML) INTRAVENOUS PRN
Status: DISCONTINUED | OUTPATIENT
Start: 2021-11-18 | End: 2021-11-18 | Stop reason: SDUPTHER

## 2021-11-18 RX ORDER — SODIUM CHLORIDE 0.9 % (FLUSH) 0.9 %
10 SYRINGE (ML) INJECTION PRN
Status: DISCONTINUED | OUTPATIENT
Start: 2021-11-18 | End: 2021-11-24 | Stop reason: HOSPADM

## 2021-11-18 RX ORDER — DEXTROSE MONOHYDRATE 25 G/50ML
12.5 INJECTION, SOLUTION INTRAVENOUS PRN
Status: DISCONTINUED | OUTPATIENT
Start: 2021-11-18 | End: 2021-11-24 | Stop reason: HOSPADM

## 2021-11-18 RX ORDER — METOPROLOL TARTRATE 5 MG/5ML
5 INJECTION INTRAVENOUS EVERY 6 HOURS
Status: DISCONTINUED | OUTPATIENT
Start: 2021-11-18 | End: 2021-11-18

## 2021-11-18 RX ORDER — SUCCINYLCHOLINE/SOD CL,ISO/PF 200MG/10ML
SYRINGE (ML) INTRAVENOUS PRN
Status: DISCONTINUED | OUTPATIENT
Start: 2021-11-18 | End: 2021-11-18 | Stop reason: SDUPTHER

## 2021-11-18 RX ORDER — SODIUM CHLORIDE, SODIUM LACTATE, POTASSIUM CHLORIDE, CALCIUM CHLORIDE 600; 310; 30; 20 MG/100ML; MG/100ML; MG/100ML; MG/100ML
1000 INJECTION, SOLUTION INTRAVENOUS ONCE
Status: DISCONTINUED | OUTPATIENT
Start: 2021-11-18 | End: 2021-11-18

## 2021-11-18 RX ORDER — LIDOCAINE HYDROCHLORIDE 10 MG/ML
INJECTION, SOLUTION EPIDURAL; INFILTRATION; INTRACAUDAL; PERINEURAL PRN
Status: DISCONTINUED | OUTPATIENT
Start: 2021-11-18 | End: 2021-11-18 | Stop reason: SDUPTHER

## 2021-11-18 RX ORDER — METOPROLOL SUCCINATE 100 MG/1
500 TABLET, EXTENDED RELEASE ORAL DAILY
Status: CANCELLED | OUTPATIENT
Start: 2021-11-18

## 2021-11-18 RX ORDER — SODIUM CHLORIDE 9 MG/ML
INJECTION, SOLUTION INTRAVENOUS CONTINUOUS PRN
Status: DISCONTINUED | OUTPATIENT
Start: 2021-11-18 | End: 2021-11-18 | Stop reason: SDUPTHER

## 2021-11-18 RX ORDER — SODIUM CHLORIDE 9 MG/ML
10 INJECTION INTRAVENOUS 2 TIMES DAILY
Status: DISCONTINUED | OUTPATIENT
Start: 2021-11-18 | End: 2021-11-24 | Stop reason: HOSPADM

## 2021-11-18 RX ORDER — ALBUMIN, HUMAN INJ 5% 5 %
SOLUTION INTRAVENOUS PRN
Status: DISCONTINUED | OUTPATIENT
Start: 2021-11-18 | End: 2021-11-18 | Stop reason: SDUPTHER

## 2021-11-18 RX ORDER — METOPROLOL TARTRATE 5 MG/5ML
10 INJECTION INTRAVENOUS EVERY 6 HOURS
Status: DISCONTINUED | OUTPATIENT
Start: 2021-11-18 | End: 2021-11-22

## 2021-11-18 RX ORDER — SODIUM CHLORIDE, SODIUM LACTATE, POTASSIUM CHLORIDE, CALCIUM CHLORIDE 600; 310; 30; 20 MG/100ML; MG/100ML; MG/100ML; MG/100ML
INJECTION, SOLUTION INTRAVENOUS CONTINUOUS
Status: DISCONTINUED | OUTPATIENT
Start: 2021-11-18 | End: 2021-11-22

## 2021-11-18 RX ORDER — SODIUM CHLORIDE 9 MG/ML
25 INJECTION, SOLUTION INTRAVENOUS PRN
Status: DISCONTINUED | OUTPATIENT
Start: 2021-11-18 | End: 2021-11-24 | Stop reason: HOSPADM

## 2021-11-18 RX ADMIN — MIDAZOLAM HYDROCHLORIDE 1 MG: 1 INJECTION, SOLUTION INTRAMUSCULAR; INTRAVENOUS at 12:35

## 2021-11-18 RX ADMIN — FENTANYL CITRATE 150 MCG: 50 INJECTION, SOLUTION INTRAMUSCULAR; INTRAVENOUS at 04:38

## 2021-11-18 RX ADMIN — PANTOPRAZOLE SODIUM 40 MG: 40 INJECTION, POWDER, FOR SOLUTION INTRAVENOUS at 20:00

## 2021-11-18 RX ADMIN — FENTANYL CITRATE 40 MCG: 50 INJECTION, SOLUTION INTRAMUSCULAR; INTRAVENOUS at 07:44

## 2021-11-18 RX ADMIN — ONDANSETRON HYDROCHLORIDE 4 MG: 2 INJECTION, SOLUTION INTRAMUSCULAR; INTRAVENOUS at 04:58

## 2021-11-18 RX ADMIN — SODIUM CHLORIDE: 9 INJECTION, SOLUTION INTRAVENOUS at 04:31

## 2021-11-18 RX ADMIN — PROPOFOL 120 MG: 10 INJECTION, EMULSION INTRAVENOUS at 04:38

## 2021-11-18 RX ADMIN — MIDAZOLAM HYDROCHLORIDE 1 MG: 1 INJECTION, SOLUTION INTRAMUSCULAR; INTRAVENOUS at 12:14

## 2021-11-18 RX ADMIN — PANTOPRAZOLE SODIUM 40 MG: 40 INJECTION, POWDER, FOR SOLUTION INTRAVENOUS at 14:40

## 2021-11-18 RX ADMIN — SODIUM CHLORIDE, PRESERVATIVE FREE 10 ML: 5 INJECTION INTRAVENOUS at 20:00

## 2021-11-18 RX ADMIN — SODIUM CHLORIDE, PRESERVATIVE FREE 10 ML: 5 INJECTION INTRAVENOUS at 14:40

## 2021-11-18 RX ADMIN — METOPROLOL TARTRATE 10 MG: 1 INJECTION, SOLUTION INTRAVENOUS at 22:17

## 2021-11-18 RX ADMIN — HYDROMORPHONE HYDROCHLORIDE 0.5 MG: 1 INJECTION, SOLUTION INTRAMUSCULAR; INTRAVENOUS; SUBCUTANEOUS at 19:47

## 2021-11-18 RX ADMIN — ROCURONIUM BROMIDE 20 MG: 10 INJECTION, SOLUTION INTRAVENOUS at 06:41

## 2021-11-18 RX ADMIN — Medication 200 MCG: at 04:41

## 2021-11-18 RX ADMIN — FENTANYL CITRATE 20 MCG: 50 INJECTION, SOLUTION INTRAMUSCULAR; INTRAVENOUS at 07:01

## 2021-11-18 RX ADMIN — SODIUM CHLORIDE, POTASSIUM CHLORIDE, SODIUM LACTATE AND CALCIUM CHLORIDE: 600; 310; 30; 20 INJECTION, SOLUTION INTRAVENOUS at 21:10

## 2021-11-18 RX ADMIN — SODIUM CHLORIDE, POTASSIUM CHLORIDE, SODIUM LACTATE AND CALCIUM CHLORIDE 1000 ML: 600; 310; 30; 20 INJECTION, SOLUTION INTRAVENOUS at 11:28

## 2021-11-18 RX ADMIN — METOPROLOL TARTRATE 10 MG: 1 INJECTION, SOLUTION INTRAVENOUS at 16:39

## 2021-11-18 RX ADMIN — PHENYLEPHRINE HYDROCHLORIDE 100 MCG/MIN: 10 INJECTION INTRAVENOUS at 05:51

## 2021-11-18 RX ADMIN — SODIUM CHLORIDE: 9 INJECTION, SOLUTION INTRAVENOUS at 04:49

## 2021-11-18 RX ADMIN — SUGAMMADEX 426 MG: 100 INJECTION, SOLUTION INTRAVENOUS at 07:32

## 2021-11-18 RX ADMIN — MIDAZOLAM 1 MG: 1 INJECTION INTRAMUSCULAR; INTRAVENOUS at 10:56

## 2021-11-18 RX ADMIN — PIPERACILLIN AND TAZOBACTAM 3375 MG: 3; .375 INJECTION, POWDER, LYOPHILIZED, FOR SOLUTION INTRAVENOUS at 03:17

## 2021-11-18 RX ADMIN — MIDAZOLAM 1 MG: 1 INJECTION INTRAMUSCULAR; INTRAVENOUS at 08:52

## 2021-11-18 RX ADMIN — PIPERACILLIN AND TAZOBACTAM 3375 MG: 3; .375 INJECTION, POWDER, LYOPHILIZED, FOR SOLUTION INTRAVENOUS at 22:00

## 2021-11-18 RX ADMIN — LIDOCAINE HYDROCHLORIDE 2 ML: 10 INJECTION, SOLUTION EPIDURAL; INFILTRATION; INTRACAUDAL; PERINEURAL at 04:38

## 2021-11-18 RX ADMIN — ENOXAPARIN SODIUM 40 MG: 100 INJECTION SUBCUTANEOUS at 14:48

## 2021-11-18 RX ADMIN — ROCURONIUM BROMIDE 10 MG: 10 INJECTION, SOLUTION INTRAVENOUS at 04:38

## 2021-11-18 RX ADMIN — SODIUM CHLORIDE, POTASSIUM CHLORIDE, SODIUM LACTATE AND CALCIUM CHLORIDE: 600; 310; 30; 20 INJECTION, SOLUTION INTRAVENOUS at 05:35

## 2021-11-18 RX ADMIN — SODIUM CHLORIDE, POTASSIUM CHLORIDE, SODIUM LACTATE AND CALCIUM CHLORIDE: 600; 310; 30; 20 INJECTION, SOLUTION INTRAVENOUS at 09:39

## 2021-11-18 RX ADMIN — INSULIN HUMAN 4 UNITS: 100 INJECTION, SOLUTION PARENTERAL at 05:22

## 2021-11-18 RX ADMIN — ROCURONIUM BROMIDE 40 MG: 10 INJECTION, SOLUTION INTRAVENOUS at 04:50

## 2021-11-18 RX ADMIN — HYDROMORPHONE HYDROCHLORIDE 0.5 MG: 1 INJECTION, SOLUTION INTRAMUSCULAR; INTRAVENOUS; SUBCUTANEOUS at 08:20

## 2021-11-18 RX ADMIN — ROCURONIUM BROMIDE 10 MG: 10 INJECTION, SOLUTION INTRAVENOUS at 05:57

## 2021-11-18 RX ADMIN — DEXAMETHASONE SODIUM PHOSPHATE 10 MG: 10 INJECTION INTRAMUSCULAR; INTRAVENOUS at 04:58

## 2021-11-18 RX ADMIN — SODIUM CHLORIDE: 9 INJECTION, SOLUTION INTRAVENOUS at 05:09

## 2021-11-18 RX ADMIN — HYDROMORPHONE HYDROCHLORIDE 0.5 MG: 1 INJECTION, SOLUTION INTRAMUSCULAR; INTRAVENOUS; SUBCUTANEOUS at 09:21

## 2021-11-18 RX ADMIN — MIDAZOLAM 1 MG: 1 INJECTION INTRAMUSCULAR; INTRAVENOUS at 10:30

## 2021-11-18 RX ADMIN — MIDAZOLAM 1 MG: 1 INJECTION INTRAMUSCULAR; INTRAVENOUS at 12:14

## 2021-11-18 RX ADMIN — Medication 100 MCG: at 05:18

## 2021-11-18 RX ADMIN — FLUCONAZOLE IN SODIUM CHLORIDE 400 MG: 2 INJECTION, SOLUTION INTRAVENOUS at 22:04

## 2021-11-18 RX ADMIN — Medication 100 MG: at 04:38

## 2021-11-18 RX ADMIN — HYDROMORPHONE HYDROCHLORIDE 0.5 MG: 1 INJECTION, SOLUTION INTRAMUSCULAR; INTRAVENOUS; SUBCUTANEOUS at 09:13

## 2021-11-18 RX ADMIN — MIDAZOLAM 1 MG: 1 INJECTION INTRAMUSCULAR; INTRAVENOUS at 09:46

## 2021-11-18 RX ADMIN — HYDROMORPHONE HYDROCHLORIDE 0.5 MG: 1 INJECTION, SOLUTION INTRAMUSCULAR; INTRAVENOUS; SUBCUTANEOUS at 08:07

## 2021-11-18 RX ADMIN — PIPERACILLIN AND TAZOBACTAM 3375 MG: 3; .375 INJECTION, POWDER, LYOPHILIZED, FOR SOLUTION INTRAVENOUS at 14:42

## 2021-11-18 RX ADMIN — Medication 100 MCG: at 05:44

## 2021-11-18 RX ADMIN — Medication 100 MCG: at 05:32

## 2021-11-18 RX ADMIN — ALBUMIN (HUMAN) 500 ML: 12.5 INJECTION, SOLUTION INTRAVENOUS at 07:14

## 2021-11-18 RX ADMIN — SODIUM CHLORIDE, PRESERVATIVE FREE 10 ML: 5 INJECTION INTRAVENOUS at 16:40

## 2021-11-18 RX ADMIN — FENTANYL CITRATE 40 MCG: 50 INJECTION, SOLUTION INTRAMUSCULAR; INTRAVENOUS at 07:56

## 2021-11-18 ASSESSMENT — PULMONARY FUNCTION TESTS
PIF_VALUE: 35
PIF_VALUE: 26
PIF_VALUE: 31
PIF_VALUE: 32
PIF_VALUE: 26
PIF_VALUE: 33
PIF_VALUE: 32
PIF_VALUE: 35
PIF_VALUE: 33
PIF_VALUE: 34
PIF_VALUE: 25
PIF_VALUE: 27
PIF_VALUE: 35
PIF_VALUE: 32
PIF_VALUE: 35
PIF_VALUE: 34
PIF_VALUE: 33
PIF_VALUE: 32
PIF_VALUE: 34
PIF_VALUE: 1
PIF_VALUE: 35
PIF_VALUE: 34
PIF_VALUE: 35
PIF_VALUE: 33
PIF_VALUE: 35
PIF_VALUE: 32
PIF_VALUE: 34
PIF_VALUE: 0
PIF_VALUE: 34
PIF_VALUE: 34
PIF_VALUE: 25
PIF_VALUE: 34
PIF_VALUE: 26
PIF_VALUE: 35
PIF_VALUE: 35
PIF_VALUE: 36
PIF_VALUE: 18
PIF_VALUE: 24
PIF_VALUE: 33
PIF_VALUE: 33
PIF_VALUE: 34
PIF_VALUE: 35
PIF_VALUE: 33
PIF_VALUE: 34
PIF_VALUE: 35
PIF_VALUE: 1
PIF_VALUE: 34
PIF_VALUE: 0
PIF_VALUE: 34
PIF_VALUE: 34
PIF_VALUE: 32
PIF_VALUE: 34
PIF_VALUE: 34
PIF_VALUE: 33
PIF_VALUE: 33
PIF_VALUE: 35
PIF_VALUE: 34
PIF_VALUE: 34
PIF_VALUE: 35
PIF_VALUE: 27
PIF_VALUE: 35
PIF_VALUE: 34
PIF_VALUE: 34
PIF_VALUE: 33
PIF_VALUE: 33
PIF_VALUE: 34
PIF_VALUE: 27
PIF_VALUE: 35
PIF_VALUE: 16
PIF_VALUE: 35
PIF_VALUE: 35
PIF_VALUE: 33
PIF_VALUE: 33
PIF_VALUE: 34
PIF_VALUE: 35
PIF_VALUE: 35
PIF_VALUE: 33
PIF_VALUE: 35
PIF_VALUE: 31
PIF_VALUE: 35
PIF_VALUE: 33
PIF_VALUE: 33
PIF_VALUE: 23
PIF_VALUE: 35
PIF_VALUE: 27
PIF_VALUE: 35
PIF_VALUE: 34
PIF_VALUE: 35
PIF_VALUE: 34
PIF_VALUE: 2
PIF_VALUE: 34
PIF_VALUE: 35
PIF_VALUE: 26
PIF_VALUE: 34
PIF_VALUE: 1
PIF_VALUE: 34
PIF_VALUE: 35
PIF_VALUE: 34
PIF_VALUE: 34
PIF_VALUE: 26
PIF_VALUE: 25
PIF_VALUE: 0
PIF_VALUE: 27
PIF_VALUE: 24
PIF_VALUE: 24
PIF_VALUE: 34
PIF_VALUE: 34
PIF_VALUE: 1
PIF_VALUE: 36
PIF_VALUE: 34
PIF_VALUE: 32
PIF_VALUE: 35
PIF_VALUE: 36
PIF_VALUE: 34
PIF_VALUE: 35
PIF_VALUE: 35
PIF_VALUE: 1
PIF_VALUE: 34
PIF_VALUE: 35
PIF_VALUE: 35
PIF_VALUE: 27
PIF_VALUE: 32
PIF_VALUE: 35
PIF_VALUE: 27
PIF_VALUE: 19
PIF_VALUE: 35
PIF_VALUE: 25
PIF_VALUE: 34
PIF_VALUE: 25
PIF_VALUE: 35
PIF_VALUE: 33
PIF_VALUE: 32
PIF_VALUE: 32
PIF_VALUE: 35
PIF_VALUE: 33
PIF_VALUE: 35
PIF_VALUE: 34
PIF_VALUE: 35
PIF_VALUE: 35
PIF_VALUE: 27
PIF_VALUE: 6
PIF_VALUE: 32
PIF_VALUE: 23
PIF_VALUE: 34
PIF_VALUE: 36
PIF_VALUE: 35
PIF_VALUE: 3
PIF_VALUE: 31
PIF_VALUE: 3
PIF_VALUE: 35
PIF_VALUE: 18
PIF_VALUE: 35
PIF_VALUE: 33
PIF_VALUE: 31
PIF_VALUE: 35
PIF_VALUE: 34
PIF_VALUE: 35
PIF_VALUE: 6
PIF_VALUE: 2
PIF_VALUE: 2
PIF_VALUE: 23
PIF_VALUE: 35
PIF_VALUE: 33
PIF_VALUE: 34
PIF_VALUE: 1
PIF_VALUE: 26
PIF_VALUE: 33
PIF_VALUE: 35
PIF_VALUE: 34
PIF_VALUE: 35
PIF_VALUE: 33
PIF_VALUE: 1
PIF_VALUE: 32
PIF_VALUE: 34
PIF_VALUE: 21
PIF_VALUE: 35
PIF_VALUE: 35
PIF_VALUE: 33
PIF_VALUE: 1
PIF_VALUE: 33
PIF_VALUE: 36
PIF_VALUE: 35
PIF_VALUE: 34
PIF_VALUE: 34
PIF_VALUE: 35
PIF_VALUE: 35
PIF_VALUE: 34
PIF_VALUE: 35
PIF_VALUE: 34
PIF_VALUE: 26
PIF_VALUE: 35
PIF_VALUE: 33
PIF_VALUE: 34
PIF_VALUE: 31

## 2021-11-18 ASSESSMENT — PAIN SCALES - GENERAL
PAINLEVEL_OUTOF10: 0
PAINLEVEL_OUTOF10: 0
PAINLEVEL_OUTOF10: 10
PAINLEVEL_OUTOF10: 7
PAINLEVEL_OUTOF10: 7
PAINLEVEL_OUTOF10: 0
PAINLEVEL_OUTOF10: 8
PAINLEVEL_OUTOF10: 7
PAINLEVEL_OUTOF10: 0
PAINLEVEL_OUTOF10: 8
PAINLEVEL_OUTOF10: 0
PAINLEVEL_OUTOF10: 8
PAINLEVEL_OUTOF10: 0
PAINLEVEL_OUTOF10: 1
PAINLEVEL_OUTOF10: 0

## 2021-11-18 ASSESSMENT — PAIN DESCRIPTION - FREQUENCY
FREQUENCY: CONTINUOUS
FREQUENCY: INTERMITTENT
FREQUENCY: INTERMITTENT

## 2021-11-18 ASSESSMENT — PAIN DESCRIPTION - LOCATION
LOCATION: ABDOMEN

## 2021-11-18 ASSESSMENT — PAIN DESCRIPTION - PAIN TYPE
TYPE: ACUTE PAIN
TYPE: SURGICAL PAIN

## 2021-11-18 ASSESSMENT — PAIN DESCRIPTION - DESCRIPTORS
DESCRIPTORS: ACHING;BURNING;CONSTANT

## 2021-11-18 ASSESSMENT — PAIN DESCRIPTION - ORIENTATION
ORIENTATION: MID

## 2021-11-18 ASSESSMENT — PAIN DESCRIPTION - ONSET
ONSET: ON-GOING
ONSET: ON-GOING

## 2021-11-18 ASSESSMENT — PAIN DESCRIPTION - PROGRESSION: CLINICAL_PROGRESSION: GRADUALLY IMPROVING

## 2021-11-18 NOTE — PROGRESS NOTES
[facility]  SURGICAL INTENSIVE CARE UNIT (SICU)  ATTENDING PHYSICIAN CRITICAL CARE PROGRESS NOTE     I have examined the patient, reviewed the record, and discussed the case with the APN/ resident. Please refer to the APN/ resident's note. I agree with the assessment and plan. I have reviewed all relevant labs and imaging data. The following summarizes my clinical findings and independent assessment. CC:  Critical care management for perforated duodenal ulcer    Hospital Course/Overnight Events:  11/18--underwent lap modified Jose Roberto patch for perforated duodenal ucler; some agitation/delirium post op--pulled one of his J-P drains    Pt reports some ongoing abd pain.     Awake and alert  Follows commands  Cooperative  Hrt:  Regular  Lungs:  Fairly clear bilaterally  Abd:  Soft; BS hypoactive; expected post op tenderness; BRIGIDO drain with minimal drainage  Skin:  Warm/dry    Patient Active Problem List    Diagnosis Date Noted    Abdominal pain 11/18/2021       S/p lap modified Edon Ronak patch repair of perforated duodenal ulcer--pain control with dilaudid  Monitor drain ouptut  Acute renal insuff--monitor BUN/Cr/UO  Hyperglycemia--SSI  HTN--start lopressor until can resume home meds  Acute blood loss anemia--monitor H/H  Elevated LFTs--monitor labs  NPO for now  DVT risk--PCDs/lovenox    Pt is at risk for hemodynamic/metabolic/neurologic deterioration and requires ICU care    Zach Ortiz MD, FACS  11/18/2021  3:48 PM      Critical care time exclusive of teaching and procedures = 38 minutes         IV fluids

## 2021-11-18 NOTE — ANESTHESIA PRE PROCEDURE
Department of Anesthesiology  Preprocedure Note       Name:  Yolanda Savage   Age:  80 y.o.  :  1940                                          MRN:  [id number]         Date:  2021      Surgeon: Rita Castellon):  Edward Boykin MD    Procedure: Procedure(s):  LAPAROSCOPIC GRAM PATCH, POSSIBLE OPEN, PARTIAL GASTRECTOMY    Medications prior to admission:   Prior to Admission medications    Medication Sig Start Date End Date Taking?  Authorizing Provider   metFORMIN (GLUCOPHAGE) 500 MG tablet Take 500 mg by mouth 2 times daily (with meals)   Yes Historical Provider, MD   NONFORMULARY doxaosin 8mg 1x pm   Yes Historical Provider, MD   glimepiride (AMARYL) 4 MG tablet Take 4 mg by mouth every morning (before breakfast)   Yes Historical Provider, MD   metoprolol succinate (TOPROL XL) 100 MG extended release tablet Take 500 mg by mouth daily   Yes Historical Provider, MD   amLODIPine (NORVASC) 10 MG tablet Take 10 mg by mouth daily   Yes Historical Provider, MD   benazepril-hydrochlorthiazide (LOTENSIN HCT) 20-25 MG per tablet Take 1 tablet by mouth daily   Yes Historical Provider, MD   pravastatin (PRAVACHOL) 40 MG tablet Take 40 mg by mouth daily   Yes Historical Provider, MD       Current medications:    Current Facility-Administered Medications   Medication Dose Route Frequency Provider Last Rate Last Admin    glucose (GLUTOSE) 40 % oral gel 15 g  15 g Oral PRN Edward Boykin MD        dextrose 50 % IV solution  12.5 g IntraVENous PRN Edward Boykin MD        glucagon (rDNA) injection 1 mg  1 mg IntraMUSCular PRN Edward Boykin MD        dextrose 5 % solution  100 mL/hr IntraVENous PRN Edward Boykin MD        insulin lispro (HUMALOG) injection vial 0-18 Units  0-18 Units SubCUTAneous Q4H Shannan Amin MD        iohexol (OMNIPAQUE 240) injection 50 mL  50 mL Oral ONCE PRN Moisés Paz DO        lactated ringers infusion 1,000 mL  1,000 mL IntraVENous Once Counseling given: Not Answered      Vital Signs (Current):   Vitals:    11/17/21 1802 11/17/21 1947 11/17/21 2046 11/18/21 0317   BP: (!) 109/53 (!) 128/58 (!) 106/51 101/60   Pulse: 87 98 95 95   Resp: 18 16 16 20   Temp: 98.6 °F (37 °C) 98.4 °F (36.9 °C) 98.6 °F (37 °C)    TempSrc: Oral Oral     SpO2: 98%  96% 93%   Weight:       Height:                                                  BP Readings from Last 3 Encounters:   11/18/21 101/60       NPO Status:                                                                                 BMI:   Wt Readings from Last 3 Encounters:   11/17/21 235 lb (106.6 kg)     Body mass index is 33.72 kg/m². CBC:   Lab Results   Component Value Date    WBC 11.9 11/17/2021    RBC 5.38 11/17/2021    HGB 12.3 11/17/2021    HCT 39.0 11/17/2021    MCV 72.5 11/17/2021    RDW 14.7 11/17/2021     11/17/2021       CMP:   Lab Results   Component Value Date     11/17/2021    K 4.4 11/17/2021    CL 91 11/17/2021    CO2 27 11/17/2021    BUN 24 11/17/2021    CREATININE 1.3 11/17/2021    GFRAA >60 11/17/2021    LABGLOM >60 11/17/2021    GLUCOSE 347 11/17/2021    PROT 7.8 11/17/2021    CALCIUM 9.8 11/17/2021    BILITOT 0.6 11/17/2021    ALKPHOS 44 11/17/2021    AST 22 11/17/2021    ALT 21 11/17/2021       POC Tests: No results for input(s): POCGLU, POCNA, POCK, POCCL, POCBUN, POCHEMO, POCHCT in the last 72 hours.     Coags: No results found for: PROTIME, INR, APTT    HCG (If Applicable): No results found for: PREGTESTUR, PREGSERUM, HCG, HCGQUANT     ABGs: No results found for: PHART, PO2ART, FMM2AZE, UUV0ZYA, BEART, E5MQOHPZ     Type & Screen (If Applicable):  No results found for: LABABO, LABRH    Drug/Infectious Status (If Applicable):  No results found for: HIV, HEPCAB    COVID-19 Screening (If Applicable): No results found for: COVID19        Anesthesia Evaluation    Airway: Mallampati: II  TM distance: >3 FB   Neck ROM: full  Mouth opening: > = 3 FB Dental: normal exam Pulmonary: breath sounds clear to auscultation                             Cardiovascular:            Rhythm: regular                      Neuro/Psych:               GI/Hepatic/Renal:            ROS comment: Perforated gastric ulcer. Endo/Other:    (+) DiabetesType II DM, , .                 Abdominal:   (+) obese,           Vascular: Other Findings: NG tube in place           Anesthesia Plan      general     ASA 3 - emergent       Induction: intravenous. MIPS: Postoperative opioids intended and Prophylactic antiemetics administered. Anesthetic plan and risks discussed with patient. Plan discussed with CRNA.                   López Thomas MD   11/18/2021

## 2021-11-18 NOTE — ED NOTES
[facility] pt with daughter at bedside, transport team given proper paperwork     Chi Beck RN  11/17/21 2023

## 2021-11-18 NOTE — ANESTHESIA PROCEDURE NOTES
Arterial Line:    An arterial line was placed using surface landmarks, in the OR for the following indication(s): continuous blood pressure monitoring and blood sampling needed. A 20 gauge (size), 1 and 3/4 inch (length), (type) catheter was placed, Seldinger technique used, into the right radial artery, secured by tape and Tegaderm. Anesthesia type: General    Events:  patient tolerated procedure well with no complications.   Anesthesiologist: Helene Remy MD  Preanesthetic Checklist  Completed: patient identified, IV checked, site marked, risks and benefits discussed, surgical consent, monitors and equipment checked, pre-op evaluation, timeout performed, anesthesia consent given, oxygen available and patient being monitored

## 2021-11-18 NOTE — ANESTHESIA POSTPROCEDURE EVALUATION
Department of Anesthesiology  Postprocedure Note    Patient: Sarthak Arriola  MRN: [id number]  YOB: 1940  Date of evaluation: 11/18/2021  Time:  7:49 AM     Procedure Summary     Date: 11/18/21 Room / Location: [facility] OR 10 / [facility]    Anesthesia Start: 7318 Anesthesia Stop:     Procedures:       LAPAROSCOPIC DREAD PATCH, PERFORATED POST PYLORIC ULCER (N/A )      EGD DIAGNOSTIC ONLY (N/A ) Diagnosis: (.)    Surgeons: Asuncion Ramos MD Responsible Provider: Helene Remy MD    Anesthesia Type: general ASA Status: 3 - Emergent          Anesthesia Type: No value filed. Gilson Phase I:      Gilson Phase II:      Last vitals: Reviewed and per EMR flowsheets.        Anesthesia Post Evaluation    Patient location during evaluation: PACU  Patient participation: complete - patient participated  Level of consciousness: awake  Pain score: 0  Airway patency: patent  Nausea & Vomiting: no nausea  Complications: no  Cardiovascular status: hemodynamically stable  Respiratory status: acceptable  Hydration status: stable      Pt seen and evaluated postop  GABBY Marin CRNA

## 2021-11-18 NOTE — PROGRESS NOTES
Cbc drawn and sent. Dr Ashley Flores here to assess patient for transfer to intensive. Updated on patient's status. Patient's sister in law called in and was updated on patient's status.

## 2021-11-18 NOTE — PLAN OF CARE
Problem: Injury - Risk of, Physical Injury:  Goal: Absence of physical injury  Description: Absence of physical injury  Outcome: Met This Shift  Goal: Will remain free from falls  Description: Will remain free from falls  Outcome: Met This Shift     Problem: Mood - Altered:  Goal: Absence of abusive behavior  Description: Absence of abusive behavior  Outcome: Not Met This Shift     Problem: Non-Violent Restraints  Goal: Removal from restraints as soon as assessed to be safe  Outcome: Not Met This Shift  Goal: No harm/injury to patient while restraints in use  Outcome: Met This Shift  Goal: Patient's dignity will be maintained  Outcome: Met This Shift     Problem: Falls - Risk of:  Goal: Absence of physical injury  Description: Absence of physical injury  Outcome: Met This Shift  Goal: Will remain free from falls  Description: Will remain free from falls  Outcome: Met This Shift

## 2021-11-18 NOTE — FLOWSHEET NOTE
Patient agitated, attempting to reach for lines/tubes. Patient intermittently following commands, no evidence of learning at this time. Bilateral soft wrist restraints started for safety and line/tube protection. Will continue to monitor.

## 2021-11-18 NOTE — FLOWSHEET NOTE
Patient continuing to exhibit agitation, only following commands at times. Patient repeatedly kicking legs over side of bed, repeatedly attempting to get out of bed. No evidence of learning. Bilateral soft ankle restraints started for safety and line/tube protection.

## 2021-11-18 NOTE — H&P
Historical Provider, MD   metoprolol succinate (TOPROL XL) 100 MG extended release tablet Take 500 mg by mouth daily   Yes Historical Provider, MD   amLODIPine (NORVASC) 10 MG tablet Take 10 mg by mouth daily   Yes Historical Provider, MD   benazepril-hydrochlorthiazide (LOTENSIN HCT) 20-25 MG per tablet Take 1 tablet by mouth daily   Yes Historical Provider, MD   pravastatin (PRAVACHOL) 40 MG tablet Take 40 mg by mouth daily   Yes Historical Provider, MD       No Known Allergies    History reviewed. No pertinent family history. Social History     Tobacco Use    Smoking status: Never Smoker    Smokeless tobacco: Never Used   Substance Use Topics    Alcohol use: Not Currently    Drug use: Never         Review of Systems - History obtained from the patient  General ROS: negative for - chills or fever  Hematological and Lymphatic ROS: negative for - bleeding problems or blood clots  Respiratory ROS: no cough, shortness of breath, or wheezing  Cardiovascular ROS: no chest pain or dyspnea on exertion  Gastrointestinal ROS: positive for abdominal pain. Negative for nausea, vomiting, and changes in bowel habits. Genito-Urinary ROS: no dysuria, trouble voiding, or hematuria  Musculoskeletal ROS: negative for - muscle pain or muscular weakness  Neurological ROS: no TIA or stroke symptoms  Dermatological ROS: negative for - rash      PHYSICAL EXAM:    Vitals:    11/17/21 2046   BP: (!) 106/51   Pulse: 95   Resp: 16   Temp: 98.6 °F (37 °C)   SpO2: 96%       General Appearance:  awake, alert, oriented, in no acute distress  Skin:  Skin color, texture, turgor normal. No rashes or lesions. Head/face:  Atraumatic, normocephalic  Eyes:  No gross abnormalities. Lungs:  Normal work of breathing on room air  Heart:  Regular rate, soft blood pressures  Abdomen:  Soft, distended, diffuse tenderness with most being in the RUQ  Extremities: Extremities warm to touch, pink, with no edema.   Neurologic:  Grossly normal    LABS:  CBC  Recent Labs     11/17/21  1519   WBC 11.9*   HGB 12.3*   HCT 39.0        BMP  Recent Labs     11/17/21  1519      K 4.4   CL 91*   CO2 27   BUN 24*   CREATININE 1.3*   CALCIUM 9.8     Liver Function  Recent Labs     11/17/21  1519   LIPASE 16   BILITOT 0.6   BILIDIR 0.2   AST 22   ALT 21   ALKPHOS 44   PROT 7.8   LABALBU 4.4     No results for input(s): LACTATE in the last 72 hours. No results for input(s): INR, PTT in the last 72 hours. Invalid input(s): PT    RADIOLOGY    CT ABDOMEN PELVIS W IV CONTRAST Additional Contrast? None    Result Date: 11/17/2021  EXAMINATION: CT OF THE ABDOMEN AND PELVIS WITH CONTRAST 11/17/2021 4:38 pm TECHNIQUE: CT of the abdomen and pelvis was performed with the administration of intravenous contrast. Multiplanar reformatted images are provided for review. Dose modulation, iterative reconstruction, and/or weight based adjustment of the mA/kV was utilized to reduce the radiation dose to as low as reasonably achievable. COMPARISON: None. HISTORY: ORDERING SYSTEM PROVIDED HISTORY: RUQ pain TECHNOLOGIST PROVIDED HISTORY: Additional Contrast?->None Reason for exam:->RUQ pain Decision Support Exception - unselect if not a suspected or confirmed emergency medical condition->Emergency Medical Condition (MA) FINDINGS: Lower Chest: Visualized lungs, heart and pericardium are normal.  The liver, spleen, adrenal glands, pancreas and gallbladder are normal.  10.0 cm cyst right kidney. The left kidney is normal. Organs: Normal large bowel. GI/bowel: Minimally distended mildly thickened proximal jejunal bowel loop of unknown clinical significance. Normal appendix. Small amount of fluid adjacent to the appendix of unknown clinical significance. Thickening of the duodenal bulb. Hiatal hernia. Pelvis: Normal urinary bladder. Mild prostatomegaly. Peritoneum/Retroperitoneum: No free air in the upper anterior abdomen.  Bones/Soft Tissues: Degenerative changes thoracolumbar spine. Thickening of the duodenal bulb with evidence of free air in the anterior upper abdomen. Findings consistent with duodenitis/peptic ulcer disease with perforation. Findings discussed on 11/17/2021 with Dr. Kurt Infante at 5:38 p.m. . 10.0 cm cyst right upper lobe. Mild prostatomegaly. ASSESSMENT:  80 y.o. male with likely perforated duodenal ulcer. PLAN:  NPO  IVF  NG to LIWS  CT AP WO IV contrast and with PO contrast  Diflucan  Zosyn  Monitor abdominal exam  Pain/nausea control prn  May need surgical intervention, will await CT imaging. Patient findings and plan discussed with Dr. Bennett Cornejo.     Electronically signed by Denise Ashby MD on 11/18/21 at 1:21 AM EST

## 2021-11-18 NOTE — OP NOTE
Operative Note      Patient: Mary Anne Merchant  YOB: 1940  MRN: [id number]    Date of Procedure: 11/18/2021    Pre-Op Diagnosis: Perforated viscous    Post-Op Diagnosis: Same and perforated D1 ulcer       Procedure(s):  LAPAROSCOPIC DREAD PATCH, PERFORATED POST PYLORIC ULCER  EGD DIAGNOSTIC ONLY    Surgeon(s):  Paulo Lundborg, MD Paulo Lundborg, MD    Assistant:   Resident: Candy Kumar MD    Anesthesia: General    Estimated Blood Loss (mL): 10 mL    Complications: None    Specimens:   ID Type Source Tests Collected by Time Destination   1 : PERITONEAL FLUID Tissue Tissue CULTURE, ANAEROBIC, CULTURE, FUNGUS, GRAM STAIN, CULTURE, SURGICAL, MISCELLANEOUS SENDOUT 1 Paulo Lundborg, MD 11/18/2021 0526        Implants:  * No implants in log *      Drains:   Closed/Suction Drain RUQ; Abdomen; Other (Comment) Bulb 19 Western Ina (Active)       Closed/Suction Drain RUQ; Other (Comment) Bulb 19 Gu[place] (Active)       NG/OG/NJ/NE Tube Nasogastric 16 fr Right nostril (Active)   Output (mL) 1000 ml 11/18/21 0221       Urethral Catheter Intermittent; Non-latex (Active)       Findings: 1 cm postpyloric duodenal ulcer with placement of omental and falciform patch. Drain #1 (lateral): above the liver. Drain #2 (medial) below the liver next to repair    Detailed Description of Procedure: Indications for procedure: Fern Villaseñor is a 68-year-old gentleman who has a past medical history of hypertension and diabetes that has been experiencing right-sided and epigastric abdominal pain for the past 4 days. He endorses nausea but no vomiting has had significantly decreased appetite. He denies any fevers or chills. He has never had this before. He denies any NSAID tobacco or alcohol use. On CT scan he had thickening of the duodenal bulb with free air and his abdominal exam was tender but not peritoneal.  Repeat CT abdomen pelvis with oral contrast demonstrated extravasation of the duodenal bulb. Laparoscopic possible open Ramiro Frock patch possible partial gastrectomy was recommended. Risk benefits alternatives of procedure include not limited to bleeding infection damage to surrounding structures prolonged intubation stroke myocardial infarction, abscess, failure of Jose Roberto patch, and death were discussed with the patient he is agreeable to proceed. Description of procedure: Patient brought the operative room placed on the operating table in supine position. Sequential compression devices were placed in the bilateral lower extremities and were functioning. General anesthesia was obtained per the anesthesia record without any complication. A Akers and arterial line were placed. Preoperative antibiotics were given within 60 minutes of incision. A timeout was performed the surgical checklist was reviewed and agreed upon by was present. The abdomen was prepped and draped in the usual sterile fashion. In the left upper quadrant at Ross's point to a 5 mm incision was made with 11 blade scalpel. The peritoneal cavity was accessed with a Veress needle and confirmed with saline drop test.  Pneumoperitoneum was established to 15 mmHg. The Veress needle was removed and the 5 mm trocar was placed into the abdomen using the Optiview technique. Camera was inserted and no injury noted. In the midline a 10 mm port was placed in in the right upper quadrant a 5 mm was placed. Both these were done under direct visitation and no injury was noted. Initial inspection of the abdomen revealed significant inflammatory process and turbid fluid throughout the entire abdomen. The fluid was suctioned and sent off for culture. Next, our attention turned to the right upper quadrant where the omentum had significantly adhered underneath the liver. The falciform was noted to be quite diminutive, however to achieve some retraction a 3 mm incision was made in the subxiphoid region.   Using the Reid-Gilbert device and 0 Vicryl was passed around the falciform and it was retracted with tension and then secured to the skin with a Julieta clamp. Attention was turned back to the abdomen, and working from the stomach towards the duodenum the omentum was carefully peeled back making sure no injury was noted this was done all the way until the C-loop of the duodenum was visualized. At this time a 1 cm  Duodenal ulcer was noted just past the pylorus and just inferior to the hepatic hilum. Careful inspection of the remaining duodenum and stomach revealed no further areas of concern for perforation. The decision was to perform a traditional Jerald Medeiros patch. Under direct visualization a another 5 mm port was placed in the left middle abdomen. There was significant inflammatory adhesions between the hepatic hilum and the first portion of the duodenum. These were carefully taken down bluntly with a peanut. A 2-0 V-lock suture was placed at the most superior portion of the ulcer and the ulcer bed was sewn closed in a running fashion, care was taken to not get too close to the hepatic hilum while at the same time in full thickness adequate bites. Once this was complete attention then turned to the omentum which was quite thickened. The mobility of this was limited and as such using the LigaSure device we created a omental pedicle to reach the perforation without significant tension. The 2-0 V-lock was then used to secure the omentum in this area. Once this was complete the abdomen was then irrigated with 6 L of saline. A gastroscope was then placed the oropharynx through the esophagus and into the stomach with minimal insufflation. There is noted to be distal severe esophagitis and old bilious stained contents within the stomach. These were suctioned and the pylorus was identified. The remaining stomach appeared healthy and no significant perforation or defect was noted.  With some difficulty, the pylorus was transversed and a leak test was performed. There was a positive leak at the most superior portion of the perforated ulcer closest to the hepatic hilum about 1 mm in size. Our attention then turned back to the abdomen, previously the falciform was noted to be quite diminutive and  We were unsure if this would be suitable for the entire perforation, however the decision was made to use the falciform for the remaining small 1 mm defect. The 0 vicryl suspending the liver was removed and then the falciform taken down with the LigaSure device. It unfortunately was noted to be still too diminutive for adequate coverage of this remaining leak. Omentum was taken from the right lower quadrant and mobilized similar to previous with the LigaSure device to create a nontension-free Jose Roberto patch coverage. Using another 2-O V-lock omentum was then patched over the remaining small leak, considering the leak was very close to the hilum the omentum was sewn to the proximal falciform to creat a seal.  This leak test was retested with the gastroscope as it had remained in the duodenum and no leak was noted. The abdomen was then irrigated and suctioned. Re-sealing the elaina and proceeding with repeat EGD  took an additional 40 minutes     Through the right upper quadrant port a 19 Western Ina BRIGIDO drain was placed secured to the skin with the 3-0 nylon and placed under the liver next to the Venora Toole patch. Lateral to this, a 5 mm incision was made port was placed in another 23 Western Ina BRIGIDO drain was placed through this and secured to the skin with a 3-0 nylon. This lateral drain was placed above the liver under direct visualization. The midline 10 mm port was removed and closed with an 0 Vicryl using the Reid-Gilbert device in a figure-of-eight fashion. The remaining port sites were closed with 4-0 Monocryl in a deep interrupted fashion and the port sites were covered with skin glue. Needle, instrument, sponge count were reported as correct x2.    Eloisa was present and scrubbed throughout the entire case. Patient tolerated the procedure well was extubated taken the postanesthesia care unit in stable condition. Electronically signed by Michell Trammell MD on 11/18/2021 at 7:58 AM     [facility]   Attending Physician Statement:  I was present during the entire procedure and was actively supervising and directing the resident. There were no immediate complications.     Rennis Holter, MD

## 2021-11-18 NOTE — ED NOTES
Called and gave report to [facility] in the E.R.      Lola Maharaj RN  11/17/21 2034

## 2021-11-18 NOTE — PROGRESS NOTES
Dr gann aware of lactic acid level and calcium level in pacu, also aware of patient being confused in pacu.

## 2021-11-18 NOTE — ED PROVIDER NOTES
HPI:  11/17/21, Time: 8:45 PM ALEXEY Schmidt is a 80 y.o. male presenting to the ED for abdominal pain, beginning 2 days ago. The complaint has been persistent, moderate in severity, and worsened by nothing. Patient reporting no vomiting. Patient reporting no fever. Patient reporting no vomiting no diarrhea. Patient was seen outlying facility had CT that showed perforated viscus. Patient reporting no chest pain no difficulty breathing. Patient is accepted here by house surgery. ROS:   Pertinent positives and negatives are stated within HPI, all other systems reviewed and are negative.  --------------------------------------------- PAST HISTORY ---------------------------------------------  Past Medical History:  has a past medical history of Diabetes mellitus ([facility] [address] Past Surgical History:  has no past surgical history on file. Social History:  reports that he has never smoked. He has never used smokeless tobacco. He reports previous alcohol use. He reports that he does not use drugs. Family History: family history is not on file. The patients home medications have been reviewed. Allergies: Patient has no known allergies. ---------------------------------------------------PHYSICAL EXAM--------------------------------------    Constitutional/General: Alert and oriented x3,   Head: Normocephalic and atraumatic  Eyes: PERRL, EOMI  Mouth: Oropharynx clear, handling secretions, no trismus  Neck: Supple, full ROM, non tender to palpation in the midline, no stridor, no crepitus, no meningeal signs  Pulmonary: Lungs clear to auscultation bilaterally, no wheezes, rales, or rhonchi. Not in respiratory distress  Cardiovascular:  Regular rate. Regular rhythm. No murmurs, gallops, or rubs. 2+ distal pulses  Chest: no chest wall tenderness  Abdomen: Tender upper abdomen. Distended upper. +BS. Musculoskeletal: Moves all extremities x 4.  Warm and well perfused, no clubbing, cyanosis, or edema. Capillary refill <3 seconds  Skin: warm and dry. No rashes. Neurologic: GCS 15, CN 2-12 grossly intact, no focal deficits, symmetric strength 5/5 in the upper and lower extremities bilaterally  Psych: Normal Affect    -------------------------------------------------- RESULTS -------------------------------------------------  I have personally reviewed all laboratory and imaging results for this patient. Results are listed below.      LABS:  Results for orders placed or performed during the hospital encounter of 11/17/21   CBC Auto Differential   Result Value Ref Range    WBC 11.9 (H) 4.5 - 11.5 E9/L    RBC 5.38 3.80 - 5.80 E12/L    Hemoglobin 12.3 (L) 12.5 - 16.5 g/dL    Hematocrit 39.0 37.0 - 54.0 %    MCV 72.5 (L) 80.0 - 99.9 fL    MCH 22.9 (L) 26.0 - 35.0 pg    MCHC 31.5 (L) 32.0 - 34.5 %    RDW 14.7 11.5 - 15.0 fL    Platelets 647 769 - 072 E9/L    MPV 10.0 7.0 - 12.0 fL    Neutrophils % 86.0 (H) 43.0 - 80.0 %    Immature Granulocytes % 0.4 0.0 - 5.0 %    Lymphocytes % 5.1 (L) 20.0 - 42.0 %    Monocytes % 8.4 2.0 - 12.0 %    Eosinophils % 0.0 0.0 - 6.0 %    Basophils % 0.1 0.0 - 2.0 %    Neutrophils Absolute 10.22 (H) 1.80 - 7.30 E9/L    Immature Granulocytes # 0.05 E9/L    Lymphocytes Absolute 0.60 (L) 1.50 - 4.00 E9/L    Monocytes Absolute 1.00 (H) 0.10 - 0.95 E9/L    Eosinophils Absolute 0.00 (L) 0.05 - 0.50 E9/L    Basophils Absolute 0.01 0.00 - 0.20 V8/W   Basic Metabolic Panel w/ Reflex to MG   Result Value Ref Range    Sodium 135 132 - 146 mmol/L    Potassium reflex Magnesium 4.4 3.5 - 5.0 mmol/L    Chloride 91 (L) 98 - 107 mmol/L    CO2 27 22 - 29 mmol/L    Anion Gap 17 (H) 7 - 16 mmol/L    Glucose 347 (H) 74 - 99 mg/dL    BUN 24 (H) 6 - 23 mg/dL    CREATININE 1.3 (H) 0.7 - 1.2 mg/dL    GFR Non-African American >60 >=60 mL/min/1.73    GFR African American >60     Calcium 9.8 8.6 - 10.2 mg/dL   Hepatic Function Panel   Result Value Ref Range    Total Protein 7.8 6.4 - 8.3 g/dL    Albumin 4.4 3.5 - 5.2 g/dL    Alkaline Phosphatase 44 40 - 129 U/L    ALT 21 0 - 40 U/L    AST 22 0 - 39 U/L    Total Bilirubin 0.6 0.0 - 1.2 mg/dL    Bilirubin, Direct 0.2 0.0 - 0.3 mg/dL    Bilirubin, Indirect 0.4 0.0 - 1.0 mg/dL   Lactate, Sepsis   Result Value Ref Range    Lactic Acid, Sepsis 2.9 (H) 0.5 - 1.9 mmol/L   Lactate, Sepsis   Result Value Ref Range    Lactic Acid, Sepsis 3.0 (H) 0.5 - 1.9 mmol/L   Lipase   Result Value Ref Range    Lipase 16 13 - 60 U/L   Urinalysis, reflex to microscopic   Result Value Ref Range    Color, UA Yellow Straw/Yellow    Clarity, UA Clear Clear    Glucose, Ur 250 (A) Negative mg/dL    Bilirubin Urine Negative Negative    Ketones, Urine Negative Negative mg/dL    Specific Gravity, UA 1.020 1.005 - 1.030    Blood, Urine LARGE (A) Negative    pH, UA 5.0 5.0 - 9.0    Protein,  (A) Negative mg/dL    Urobilinogen, Urine 0.2 <2.0 E.U./dL    Nitrite, Urine Negative Negative    Leukocyte Esterase, Urine Negative Negative   Microscopic Urinalysis   Result Value Ref Range    WBC, UA 0-1 0 - 5 /HPF    RBC, UA 1-3 0 - 2 /HPF    Bacteria, UA RARE (A) None Seen /HPF   EKG 12 Lead   Result Value Ref Range    Ventricular Rate 89 BPM    Atrial Rate 89 BPM    P-R Interval 216 ms    QRS Duration 90 ms    Q-T Interval 364 ms    QTc Calculation (Bazett) 442 ms    P Axis 16 degrees    R Axis 1 degrees    T Axis 19 degrees       RADIOLOGY:  Interpreted by Radiologist.  CT ABDOMEN PELVIS W IV CONTRAST Additional Contrast? None   Final Result   Thickening of the duodenal bulb with evidence of free air in the anterior   upper abdomen. Findings consistent with duodenitis/peptic ulcer disease with   perforation. Findings discussed on 11/17/2021 with Dr. Vandana Yousif at 5:38 p.m. .      10.0 cm cyst right upper lobe. Mild prostatomegaly.                      ------------------------- NURSING NOTES AND VITALS REVIEWED ---------------------------   The nursing notes within the ED encounter and vital signs as below have been reviewed by myself. BP (!) 106/51   Pulse 95   Temp 98.6 °F (37 °C)   Resp 16   Ht 5' 10\" (1.778 m)   Wt 235 lb (106.6 kg)   SpO2 96%   BMI 33.72 kg/m²   Oxygen Saturation Interpretation: Normal    The patients available past medical records and past encounters were reviewed. ------------------------------ ED COURSE/MEDICAL DECISION MAKING----------------------  Medications   ondansetron (ZOFRAN) injection 4 mg (4 mg IntraVENous Given 11/17/21 1521)   0.9 % sodium chloride infusion (1,000 mLs IntraVENous New Bag 11/17/21 1752)   HYDROmorphone (DILAUDID) injection 0.5 mg (0 mg IntraVENous Held 11/17/21 2024)   0.9 % sodium chloride bolus (0 mLs IntraVENous Stopped 11/17/21 1622)   ketorolac (TORADOL) injection 15 mg (15 mg IntraVENous Given 11/17/21 1528)   fentaNYL (SUBLIMAZE) injection 50 mcg (50 mcg IntraVENous Given 11/17/21 1642)   iopamidol (ISOVUE-370) 76 % injection 75 mL (75 mLs IntraVENous Given 11/17/21 1651)   sodium chloride flush 0.9 % injection 10 mL (10 mLs IntraVENous Given 11/17/21 1643)   fentaNYL (SUBLIMAZE) injection 75 mcg (75 mcg IntraVENous Given 11/17/21 1802)   cefTRIAXone (ROCEPHIN) 1,000 mg in sterile water 10 mL IV syringe (1,000 mg IntraVENous New Bag 11/17/21 1802)   metronidazole (FLAGYL) 500 mg in NaCl 100 mL IVPB premix (0 mg IntraVENous Stopped 11/17/21 1943)   fentaNYL (SUBLIMAZE) injection 50 mcg (50 mcg IntraVENous Given 11/17/21 2008)             Medical Decision Making:   Presenting here because of abdominal pain. Patient was seen at outlying facility. Patient was diagnosed with perforated duodenal ulcer. Patient was sent here for surgical evaluation I did speak to surgery and they will evaluate patient. Re-Evaluations:             Re-evaluation. Patients symptoms show no change    Patient made aware of plans and evaluation by general surgery  Consultations:             surgery    Critical Care:            This patient's ED course included: a personal history and physicial eaxmination    This patient has been closely monitored during their ED course. Counseling: The emergency provider has spoken with the patient and discussed todays results, in addition to providing specific details for the plan of care and counseling regarding the diagnosis and prognosis. Questions are answered at this time and they are agreeable with the plan.       --------------------------------- IMPRESSION AND DISPOSITION ---------------------------------    IMPRESSION  1. [facility]        DISPOSITION  Disposition: Admit to surgery  Patient condition is fair        NOTE: This report was transcribed using voice recognition software.  Every effort was made to ensure accuracy; however, inadvertent computerized transcription errors may be present          Sheila Lyon MD  11/17/21 4212       Sheila Lyon MD  11/17/21 5476

## 2021-11-18 NOTE — PLAN OF CARE
Problem: Pain:  Goal: Control of acute pain  Description: Control of acute pain  Outcome: Met This Shift     Problem: Injury - Risk of, Physical Injury:  Goal: Absence of physical injury  Description: Absence of physical injury  11/18/2021 1607 by Sky Potter RN  Outcome: Met This Shift  11/18/2021 1507 by Cassidy Pina RN  Outcome: Met This Shift     Problem: Injury - Risk of, Physical Injury:  Goal: Will remain free from falls  Description: Will remain free from falls  11/18/2021 1607 by Sky Potter RN  Outcome: Met This Shift  11/18/2021 1507 by Cassidy Pina RN  Outcome: Met This Shift     Problem: Non-Violent Restraints  Goal: No harm/injury to patient while restraints in use  11/18/2021 1607 by Sky Potter RN  Outcome: Met This Shift  11/18/2021 1507 by Cassidy Pina RN  Outcome: Met This Shift     Problem: Falls - Risk of:  Goal: Will remain free from falls  Description: Will remain free from falls  11/18/2021 1607 by Sky Potter RN  Outcome: Met This Shift  11/18/2021 1507 by Cassidy Pina RN  Outcome: Met This Shift     Problem: Confusion - Acute:  Goal: Absence of continued neurological deterioration signs and symptoms  Description: Absence of continued neurological deterioration signs and symptoms  Outcome: Not Met This Shift     Problem: Confusion - Acute:  Goal: Mental status will be restored to baseline  Description: Mental status will be restored to baseline  Outcome: Not Met This Shift     Problem: Discharge Planning:  Goal: Ability to perform activities of daily living will improve  Description: Ability to perform activities of daily living will improve  Outcome: Not Met This Shift     Problem: Mood - Altered:  Goal: Mood stable  Description: Mood stable  Outcome: Not Met This Shift     Problem: Sleep Pattern Disturbance:  Goal: Appears well-rested  Description: Appears well-rested  Outcome: Not Met This Shift     Problem: Non-Violent Restraints  Goal: Removal from restraints as soon as assessed to be safe  11/18/2021 1607 by Rissa Edwards RN  Outcome: Not Met This Shift  11/18/2021 1507 by Dayanna Umanzor RN  Outcome: Not Met This Shift

## 2021-11-18 NOTE — PROGRESS NOTES
Wallet, watch, and car keys given to sister in law Tano Jamison.   Electronically signed by Odessa Jim RN on 11/18/2021 at 1:42 PM

## 2021-11-19 LAB
ALBUMIN SERPL-MCNC: 2.6 G/DL (ref 3.5–5.2)
ALP BLD-CCNC: 22 U/L (ref 40–129)
ALT SERPL-CCNC: 29 U/L (ref 0–40)
ANION GAP SERPL CALCULATED.3IONS-SCNC: 11 MMOL/L (ref 7–16)
ANISOCYTOSIS: ABNORMAL
AST SERPL-CCNC: 39 U/L (ref 0–39)
BASOPHILS ABSOLUTE: 0.02 E9/L (ref 0–0.2)
BASOPHILS RELATIVE PERCENT: 0.2 % (ref 0–2)
BILIRUB SERPL-MCNC: 0.4 MG/DL (ref 0–1.2)
BUN BLDV-MCNC: 20 MG/DL (ref 6–23)
CALCIUM IONIZED: 1.19 MMOL/L (ref 1.15–1.33)
CALCIUM SERPL-MCNC: 8.2 MG/DL (ref 8.6–10.2)
CHLORIDE BLD-SCNC: 107 MMOL/L (ref 98–107)
CO2: 22 MMOL/L (ref 22–29)
CREAT SERPL-MCNC: 1.1 MG/DL (ref 0.7–1.2)
EOSINOPHILS ABSOLUTE: 0 E9/L (ref 0.05–0.5)
EOSINOPHILS RELATIVE PERCENT: 0 % (ref 0–6)
GFR AFRICAN AMERICAN: >60
GFR NON-AFRICAN AMERICAN: >60 ML/MIN/1.73
GLUCOSE BLD-MCNC: 78 MG/DL (ref 74–99)
GRAM STAIN ORDERABLE: NORMAL
HCT VFR BLD CALC: 33.4 % (ref 37–54)
HEMOGLOBIN: 10.4 G/DL (ref 12.5–16.5)
IMMATURE GRANULOCYTES #: 0.14 E9/L
IMMATURE GRANULOCYTES %: 1.3 % (ref 0–5)
LYMPHOCYTES ABSOLUTE: 0.62 E9/L (ref 1.5–4)
LYMPHOCYTES RELATIVE PERCENT: 5.7 % (ref 20–42)
MAGNESIUM: 2.3 MG/DL (ref 1.6–2.6)
MCH RBC QN AUTO: 22.5 PG (ref 26–35)
MCHC RBC AUTO-ENTMCNC: 31.1 % (ref 32–34.5)
MCV RBC AUTO: 72.3 FL (ref 80–99.9)
METER GLUCOSE: 71 MG/DL (ref 74–99)
METER GLUCOSE: 77 MG/DL (ref 74–99)
METER GLUCOSE: 80 MG/DL (ref 74–99)
METER GLUCOSE: 85 MG/DL (ref 74–99)
METER GLUCOSE: 86 MG/DL (ref 74–99)
METER GLUCOSE: 86 MG/DL (ref 74–99)
MONOCYTES ABSOLUTE: 0.7 E9/L (ref 0.1–0.95)
MONOCYTES RELATIVE PERCENT: 6.5 % (ref 2–12)
NEUTROPHILS ABSOLUTE: 9.32 E9/L (ref 1.8–7.3)
NEUTROPHILS RELATIVE PERCENT: 86.3 % (ref 43–80)
OVALOCYTES: ABNORMAL
PDW BLD-RTO: 15.2 FL (ref 11.5–15)
PHOSPHORUS: 2.6 MG/DL (ref 2.5–4.5)
PLATELET # BLD: 179 E9/L (ref 130–450)
PMV BLD AUTO: 10.7 FL (ref 7–12)
POIKILOCYTES: ABNORMAL
POLYCHROMASIA: ABNORMAL
POTASSIUM REFLEX MAGNESIUM: 3.8 MMOL/L (ref 3.5–5)
RBC # BLD: 4.62 E12/L (ref 3.8–5.8)
SODIUM BLD-SCNC: 140 MMOL/L (ref 132–146)
TOTAL PROTEIN: 5.8 G/DL (ref 6.4–8.3)
WBC # BLD: 10.8 E9/L (ref 4.5–11.5)

## 2021-11-19 PROCEDURE — 37799 UNLISTED PX VASCULAR SURGERY: CPT

## 2021-11-19 PROCEDURE — 1200000000 HC SEMI PRIVATE

## 2021-11-19 PROCEDURE — 36415 COLL VENOUS BLD VENIPUNCTURE: CPT

## 2021-11-19 PROCEDURE — 82330 ASSAY OF CALCIUM: CPT

## 2021-11-19 PROCEDURE — 82962 GLUCOSE BLOOD TEST: CPT

## 2021-11-19 PROCEDURE — 83735 ASSAY OF MAGNESIUM: CPT

## 2021-11-19 PROCEDURE — 6360000002 HC RX W HCPCS: Performed by: STUDENT IN AN ORGANIZED HEALTH CARE EDUCATION/TRAINING PROGRAM

## 2021-11-19 PROCEDURE — 2580000003 HC RX 258: Performed by: STUDENT IN AN ORGANIZED HEALTH CARE EDUCATION/TRAINING PROGRAM

## 2021-11-19 PROCEDURE — 85025 COMPLETE CBC W/AUTO DIFF WBC: CPT

## 2021-11-19 PROCEDURE — 2500000003 HC RX 250 WO HCPCS: Performed by: STUDENT IN AN ORGANIZED HEALTH CARE EDUCATION/TRAINING PROGRAM

## 2021-11-19 PROCEDURE — 2700000000 HC OXYGEN THERAPY PER DAY

## 2021-11-19 PROCEDURE — 99233 SBSQ HOSP IP/OBS HIGH 50: CPT | Performed by: SURGERY

## 2021-11-19 PROCEDURE — 80053 COMPREHEN METABOLIC PANEL: CPT

## 2021-11-19 PROCEDURE — C9113 INJ PANTOPRAZOLE SODIUM, VIA: HCPCS | Performed by: STUDENT IN AN ORGANIZED HEALTH CARE EDUCATION/TRAINING PROGRAM

## 2021-11-19 PROCEDURE — 94669 MECHANICAL CHEST WALL OSCILL: CPT

## 2021-11-19 PROCEDURE — 84100 ASSAY OF PHOSPHORUS: CPT

## 2021-11-19 RX ADMIN — PIPERACILLIN AND TAZOBACTAM 3375 MG: 3; .375 INJECTION, POWDER, LYOPHILIZED, FOR SOLUTION INTRAVENOUS at 14:24

## 2021-11-19 RX ADMIN — PIPERACILLIN AND TAZOBACTAM 3375 MG: 3; .375 INJECTION, POWDER, LYOPHILIZED, FOR SOLUTION INTRAVENOUS at 21:29

## 2021-11-19 RX ADMIN — SODIUM CHLORIDE, PRESERVATIVE FREE 10 ML: 5 INJECTION INTRAVENOUS at 09:19

## 2021-11-19 RX ADMIN — METOPROLOL TARTRATE 10 MG: 1 INJECTION, SOLUTION INTRAVENOUS at 10:51

## 2021-11-19 RX ADMIN — SODIUM CHLORIDE, POTASSIUM CHLORIDE, SODIUM LACTATE AND CALCIUM CHLORIDE: 600; 310; 30; 20 INJECTION, SOLUTION INTRAVENOUS at 13:58

## 2021-11-19 RX ADMIN — METOPROLOL TARTRATE 10 MG: 1 INJECTION, SOLUTION INTRAVENOUS at 05:14

## 2021-11-19 RX ADMIN — ENOXAPARIN SODIUM 40 MG: 100 INJECTION SUBCUTANEOUS at 09:18

## 2021-11-19 RX ADMIN — PANTOPRAZOLE SODIUM 40 MG: 40 INJECTION, POWDER, FOR SOLUTION INTRAVENOUS at 09:18

## 2021-11-19 RX ADMIN — SODIUM CHLORIDE, POTASSIUM CHLORIDE, SODIUM LACTATE AND CALCIUM CHLORIDE: 600; 310; 30; 20 INJECTION, SOLUTION INTRAVENOUS at 05:13

## 2021-11-19 RX ADMIN — METOPROLOL TARTRATE 10 MG: 1 INJECTION, SOLUTION INTRAVENOUS at 17:38

## 2021-11-19 RX ADMIN — ONDANSETRON 4 MG: 2 INJECTION INTRAMUSCULAR; INTRAVENOUS at 14:58

## 2021-11-19 RX ADMIN — METOPROLOL TARTRATE 10 MG: 1 INJECTION, SOLUTION INTRAVENOUS at 21:29

## 2021-11-19 RX ADMIN — FLUCONAZOLE IN SODIUM CHLORIDE 400 MG: 2 INJECTION, SOLUTION INTRAVENOUS at 22:10

## 2021-11-19 RX ADMIN — SODIUM CHLORIDE, PRESERVATIVE FREE 10 ML: 5 INJECTION INTRAVENOUS at 21:33

## 2021-11-19 RX ADMIN — HYDROMORPHONE HYDROCHLORIDE 0.5 MG: 1 INJECTION, SOLUTION INTRAMUSCULAR; INTRAVENOUS; SUBCUTANEOUS at 14:54

## 2021-11-19 RX ADMIN — PIPERACILLIN AND TAZOBACTAM 3375 MG: 3; .375 INJECTION, POWDER, LYOPHILIZED, FOR SOLUTION INTRAVENOUS at 05:26

## 2021-11-19 RX ADMIN — SODIUM CHLORIDE, PRESERVATIVE FREE 10 ML: 5 INJECTION INTRAVENOUS at 21:29

## 2021-11-19 RX ADMIN — PANTOPRAZOLE SODIUM 40 MG: 40 INJECTION, POWDER, FOR SOLUTION INTRAVENOUS at 21:29

## 2021-11-19 ASSESSMENT — PAIN SCALES - GENERAL
PAINLEVEL_OUTOF10: 0
PAINLEVEL_OUTOF10: 0
PAINLEVEL_OUTOF10: 5
PAINLEVEL_OUTOF10: 0
PAINLEVEL_OUTOF10: 0
PAINLEVEL_OUTOF10: 8

## 2021-11-19 NOTE — FLOWSHEET NOTE
Patient relocating to [facility]. Nurse to nurse given to [facility]. Will notify family, now.

## 2021-11-19 NOTE — PROGRESS NOTES
SURGICAL INTENSIVE CARE  PROGRESS NOTE    Date of admission:  11/17/2021  Reason for ICU transfer:  Perforation of duodenal ulcer     SUBJECTIVE:  Anju Lennon is a 80 y.o. male who presents with a chief complaint of abdominal pain. CT demonstrated evidence of duodenal ulcer with perforation. Laparoscopy with Caron Beards patch performed 11/18    This morning, the patient is alert, oriented, and asking questions with appropriate understanding. There is minimal serosanguinous fluid from Clem-Mon drain; nurse reports that the drain was pulling lots of air overnight. NG tube is suctioning minimal dark brown fluid. Laparoscopic abdominal incisions appear clean and free of erythema or swelling. Patient denies abdominal pain.     HOSPITAL COURSE:  11/18/21 - Perforated duodenal ulcer, underwent laparoscopic Caron Beards patch; post-op agitation/delerium, pulled his Clem-Mon drain  11/19/21 - Delirium resolved, minimal drainage, transfer to floor      PHYSICAL EXAM:  /69   Pulse 96   Temp 98.9 °F (37.2 °C) (Axillary)   Resp 23   Ht 5' 10\" (1.778 m)   Wt 251 lb 1.6 oz (113.9 kg)   SpO2 95%   BMI 36.03 kg/m²     GENERAL:  Alert and oriented, follows commands and asks questions that demonstrate appropriate insight; cooperative and in no acute distress  HEAD:  Normocephalic, atraumatic  EYES:   No scleral icterus, PERRLA  LUNGS:  No increased work of breathing, clear to auscultation  CARDIOVASCULAR: Regular rate and rhythm  ABDOMEN:  Soft, non-distended, non-tender, some guarding; minimal serosanguinous drainage from Clem-Mon drain  EXTREMITIES:   Atraumatic, no edema  SKIN:  Warm and dry  NEUROLOGIC:  GCS 15    ASSESSMENT / PLAN:  Neuro:  Acute pain syndrome--continue pain control  Post-operative delirium--resolved  CV: Hypertension--continue lopressor, resume home medications once able, continue invasive monitoring of hemodynamics  Pulm: No acute issues--encourage IS/SMI  GI: Perforated duodenal ulvcer--s/p laparoscopy and Jose Roberto patch, monitor drain output, NPO  Transaminitis--elevated LFTs, monitor labs  Renal: Acute renal insuffiencey--monitor BUN, Cr, urine output  Endocrine: Hyperglycemia--monitor glucose, high dose sliding scale  MSK: No acute issues--PT/OT  Heme: Acute blood loss anemia--monitor hemoglobin and hematocrit  ID: Perforated viscus--continue diflucan, zosyn    Pain/Analgesia: Dilaudid  Total fluid rate: Lactated ringers 125 mL/h  Bowel regimen: NPO  DVT proph:  PCDs  GI proph:  Protonix  Glucose protocol: High dose sliding scale  Mouth/eye care: As needed per patient  Akers:   11/18, keep in place for fluid monitoring.   CVC sites:  Arterial line, 11/18    Ancillary consults: House surgery  Family Update: As available    Disposition: Transfer out of SICU to floor for observation    [address], [place]  11/19/2021  8:01 AM

## 2021-11-19 NOTE — DISCHARGE SUMMARY
Physician Discharge Summary     Patient ID:  Meagan Ceballos  [id number]  80 y.o.  1940    Admit date: 11/17/2021    Discharge date and time: 11/24/2021  2:40 PM     Admitting Physician: Mary Anne Ivan MD     Admission Diagnoses: Abdominal pain [R10.9]  Duodenal ulcer perforation ([address] [K26.5]    Discharge Diagnoses: Principal Problem:    Duodenal ulcer perforation ([address]  Active Problems:    Abdominal pain    Type 2 diabetes mellitus with hyperglycemia, without long-term current use of insulin ([address]    Delirium    Acute renal disease  Resolved Problems:    * No resolved hospital problems. *      Admission Condition: serious    Discharged Condition: stable    Indication for Admission: Perforated Duodenal Ulcer    Hospital Course/Procedures/Operation/treatments:   11/17:Rashad Calvo is a 80 y.o. male who presents for evaluation of pain since 11/14. He states that he came to the hospital because the pain has been getting worse. The pain is constant. Is not relieved by anything. Movement makes the pain worse. He has never had anything like this before. States that he is having regular brown nonbloody bowel movements with his last being yesterday evening. He has not eaten for the past 2 days because he has had no appetite. He states that he is not nauseous and has not had any emesis. He denies any GERD-like symptoms. He only takes aspirin but no other blood thinning medication. 11/18:underwent lap modified Jose Roberto patch for perforated duodenal ucler; some agitation/delirium post op--pulled one of his J-P drains  11/19: no acute issues overnight. significantly improved from a neuro standpoint   11/20: Patient began passing gas overnight. Drain 750cc serosang. 11/21: Doing well this AM, hungry, BRIGIDO w/ 110 cc out, for UGI today  11/22: UGI demonstrated no leak, started on clears and tolerated 2L PO, drain output 15 mL, diet advanced, PO meds restarted, having multiple BMs.   11/23: Feels well.  Less BMs after fiber and immodium. Tolerating diet. BRIGIDO with 5cc output. Abx to stop today and likely pull BRIGIDO. Awaiting PT evaluation and possible discharge. H pylori still pending. 11/24: Continues to do well. Tolerating diet. BRIGIDO removed yesterday. 20/24 with PTOT. For discharge today. Consults:   IP CONSULT TO GENERAL SURGERY  IP CONSULT HOUSE SURGERY  IP CONSULT TO HOME CARE NEEDS    Significant Diagnostic Studies:   CT ABDOMEN PELVIS WO CONTRAST Additional Contrast? Oral    Addendum Date: 11/18/2021    ADDENDUM: I verbally informed Dr. Slime Garcia of critical findings on 11/18/2021 at 4 a.m. [place] Result Date: 11/18/2021  EXAMINATION: CT OF THE ABDOMEN AND PELVIS WITHOUT CONTRAST 11/18/2021 2:54 am TECHNIQUE: CT of the abdomen and pelvis was performed without the administration of intravenous contrast. Multiplanar reformatted images are provided for review. Dose modulation, iterative reconstruction, and/or weight based adjustment of the mA/kV was utilized to reduce the radiation dose to as low as reasonably achievable. COMPARISON: None. HISTORY: ORDERING SYSTEM PROVIDED HISTORY: concern for perforation TECHNOLOGIST PROVIDED HISTORY: Reason for exam:->concern for perforation Additional Contrast?->Oral Decision Support Exception - unselect if not a suspected or confirmed emergency medical condition->Emergency Medical Condition (MA) What reading provider will be dictating this exam?->CRC FINDINGS: Lower Chest: There is now a small right pleural effusion. There is bibasilar atelectasis. There are coronary artery calcifications. Abdomen: There is increased free intraperitoneal air in the upper abdomen, now moderate. There is extravasated contrast.  This appears to be emanating from the duodenal bulb region. Findings are very suspicious for perforated duodenal ulcer. Within the limitations of a noncontrast exam, the visualized liver, spleen, pancreas, adrenal glands and kidneys demonstrate no acute abnormality.   Large right renal cyst is unchanged. There are no findings of intestinal obstruction. The appendix is normal. There is cholelithiasis. Pelvis:  Bladder is unremarkable in appearance. There is a small amount of free fluid in the pelvis. No abnormal pelvic mass seen. Bones/Soft Tissues: No acute abnormality     Since the previous study, there is increased free intraperitoneal air and there is new extravasated fluid and gastrointestinal contrast.  This appears to be emanating from the duodenal bulb region. Findings are compatible with perforated duodenal ulcer. There is a new small right pleural effusion. CT ABDOMEN PELVIS W IV CONTRAST Additional Contrast? None    Result Date: 11/17/2021  EXAMINATION: CT OF THE ABDOMEN AND PELVIS WITH CONTRAST 11/17/2021 4:38 pm TECHNIQUE: CT of the abdomen and pelvis was performed with the administration of intravenous contrast. Multiplanar reformatted images are provided for review. Dose modulation, iterative reconstruction, and/or weight based adjustment of the mA/kV was utilized to reduce the radiation dose to as low as reasonably achievable. COMPARISON: None. HISTORY: ORDERING SYSTEM PROVIDED HISTORY: RUQ pain TECHNOLOGIST PROVIDED HISTORY: Additional Contrast?->None Reason for exam:->RUQ pain Decision Support Exception - unselect if not a suspected or confirmed emergency medical condition->Emergency Medical Condition (MA) FINDINGS: Lower Chest: Visualized lungs, heart and pericardium are normal.  The liver, spleen, adrenal glands, pancreas and gallbladder are normal.  10.0 cm cyst right kidney. The left kidney is normal. Organs: Normal large bowel. GI/bowel: Minimally distended mildly thickened proximal jejunal bowel loop of unknown clinical significance. Normal appendix. Small amount of fluid adjacent to the appendix of unknown clinical significance. Thickening of the duodenal bulb. Hiatal hernia. Pelvis: Normal urinary bladder. Mild prostatomegaly. Peritoneum/Retroperitoneum: No free air in the upper anterior abdomen. Bones/Soft Tissues: Degenerative changes thoracolumbar spine. Thickening of the duodenal bulb with evidence of free air in the anterior upper abdomen. Findings consistent with duodenitis/peptic ulcer disease with perforation. Findings discussed on 11/17/2021 with Dr. Rosanna Leary at 5:38 p.m. . 10.0 cm cyst right upper lobe. Mild prostatomegaly. XR ABDOMEN FOR NG/OG/NE TUBE PLACEMENT    Result Date: 11/18/2021  EXAMINATION: ONE SUPINE XRAY VIEW(S) OF THE ABDOMEN 11/18/2021 8:58 am COMPARISON: None. HISTORY: ORDERING SYSTEM PROVIDED HISTORY: Confirmation of course of NG/OG/NE tube and location of tip of tube TECHNOLOGIST PROVIDED HISTORY: Reason for exam:->Confirmation of course of NG/OG/NE tube and location of tip of tube Portable? ->Yes What reading provider will be dictating this exam?->CRC FINDINGS: Enteric tube identified below the diaphragm in the stomach. Visualized bowel gas pattern is nonspecific, nonobstructive. Enteric tube in the stomach       Discharge Exam:  Constitutional:       Appearance: Normal appearance. He is obese. HENT:      Head: Normocephalic and atraumatic. Nose: Nose normal.      Mouth/Throat:      Mouth: Mucous membranes are moist.      Pharynx: Oropharynx is clear. Eyes:      Extraocular Movements: Extraocular movements intact. Pupils: Pupils are equal, round, and reactive to light. Cardiovascular:      Rate and Rhythm: Normal rate and regular rhythm. Pulses: Normal pulses. Heart sounds: Normal heart sounds. Pulmonary:      Effort: Pulmonary effort is normal.      Breath sounds: Normal breath sounds. Abdominal:      General: There is no distension. Palpations: Abdomen is soft. Tenderness: There is no abdominal tenderness. Comments: Wounds clean dry and intact     Musculoskeletal:         General: No tenderness or signs of injury.       Cervical back: Normal range of motion and neck supple. Skin:     General: Skin is warm and dry. Neurological:      General: No focal deficit present. Mental Status: He is alert and oriented to person, place, and time. Psychiatric:         Mood and Affect: Mood normal.         Behavior: Behavior normal.         Thought Content: Thought content normal.         Judgment: Judgment normal.     Disposition: home    In process/preliminary results:  Outstanding Order Results       Date and Time Order Name Status Description    11/18/2021  5:26 AM Culture, Fungus In process             Patient Instructions:   Discharge Medication List as of 11/24/2021  1:21 PM             Details   sucralfate (CARAFATE) 1 GM tablet Take 1 tablet by mouth 4 times daily, Disp-120 tablet, R-0Normal      pantoprazole (PROTONIX) 40 MG tablet Take 1 tablet by mouth 2 times daily (before meals), Disp-60 tablet, R-0Normal                Details   metFORMIN (GLUCOPHAGE) 500 MG tablet Take 500 mg by mouth 2 times daily (with meals)Historical Med      NONFORMULARY doxaosin 8mg 1x pmHistorical Med      glimepiride (AMARYL) 4 MG tablet Take 4 mg by mouth every morning (before breakfast)Historical Med      metoprolol succinate (TOPROL XL) 50 MG extended release tablet Take 50 mg by mouth daily Historical Med      amLODIPine (NORVASC) 10 MG tablet Take 10 mg by mouth dailyHistorical Med      benazepril-hydrochlorthiazide (LOTENSIN HCT) 20-25 MG per tablet Take 1 tablet by mouth dailyHistorical Med      pravastatin (PRAVACHOL) 40 MG tablet Take 40 mg by mouth dailyHistorical Med             SURGERY DISCHARGE INSTRUCTIONS    You may be drowsy or lightheaded after receiving sedation or anesthesia. A responsible person should be with you for the next 24 hours. FOLLOW UP: Call office to schedule follow-up appointment in 2 weeks. DIET: Advance your diet as tolerated. Start with light diet and progress to your normal diet as you feel like eating.  If you experience nausea or repeated episodes of vomiting which persist beyond 12-24 hours, notify your doctor. ACTIVITY: Rest today. Increase activity gradually. Recommend walking every day to help with return of bowel function and healing. No heavy lifting or strenuous activity for 4 weeks if you had a surgical procedure - nothing over 15 lbs. No driving for 24 hours after a procedure. No driving while on prescription pain medication. SHOWER/BATHING: Okay to shower in 24 hours after procedure. No tub bathing, soaking, or swimming for 2 weeks. WOUND CARE: You have Dermabond dressing (skin glue) applied to your incisions with sutures underneath your skin. The glue will gradually work itself off over the next few weeks and the stitches will dissolve on their own. You do not need to apply anything over them. Avoid directly applying lotions, creams or oils to your incision. Keep incisions clean and dry. Always ensure you and your care giver clean hands before and after caring for the wound. You may place ice on incisions to decrease the pain and bruising. MEDICATIONS: Take as prescribed. Pain from the incision(s) is normal. The pain will vary from day to day and with any changes in your activity level, but it should gradually decrease over time. If you were given a prescription for a narcotic pain medication (percocet, norco, etc) you should NOT drink alcohol, drive, or operate any machinery. Do not take the narcotic medication if you are not having pain. If your pain is mild, you may take over-the-counter ibuprofen or tylenol for pain as directed, limit total amount of acetaminophen (tylenol) to 3 grams per 24-hour period and please note that NSAIDs (ibuprofen) can cause bleeding or stomach upset. You may experience constipation while taking pain medication, strongly recommended to take over-the-counter stool softeners (Docusate/Colace or Senokot-S) while using pain medications - use as directed on bottle.  Okay to resume anticoagulant medication after 24hrs.         SPECIAL INSTRUCTIONS:   Call physician if you have any questions, to schedule a follow-up appointment, and if you notice any of the following:  Fever (temperature over 101ºF) or chills  Persistent nausea, vomiting, or bloating  Severe pain that is not relieved by the prescribed pain medication  Swelling or redness around your incision(s)  No bowel movement and feeling uncomfortable  Significant change in urination or no urine output for 8 hours  Excess bleeding (from the rectum or incision) - more than ½ cup that does not stop    Follow up:   Asuncion Ramos MD  [address] [place], [place] [id number]    In 2 weeks  For Post-Op Check     Signed:    Electronically signed by Asuncion Ramos MD on 11/24/2021 at 7:42 PM

## 2021-11-19 NOTE — FLOWSHEET NOTE
Patient alert with periods of forgetfulness. Patient has not been attempting to get out of bed or kick legs over side of bed. Patient following commands, reinforcement needed at times. Patient will attempt at times to reach for NG tube/IV sites. Bilateral soft ankle restraints discontinued. Will continue to monitor.

## 2021-11-19 NOTE — PROGRESS NOTES
[facility]  SURGICAL INTENSIVE CARE UNIT (SICU)  ATTENDING PHYSICIAN CRITICAL CARE PROGRESS NOTE     I have examined the patient, reviewed the record, and discussed the case with the APN/ resident. Please refer to the APN/ resident's note. I agree with the assessment and plan. I have reviewed all relevant labs and imaging data. The following summarizes my clinical findings and independent assessment. CC:  Critical care management for perforated duodenal ulcer    Hospital Course/Overnight Events:  11/18--underwent lap modified Jose Roberto patch for perforated duodenal ucler; some agitation/delirium post op--pulled one of his J-P drains  11/19--no issues overnight    Pt denies abd pain. Reports he is passing flatus; denies nausea. Asking for something to drink.     Asleep but arousable  Follows commands  Hrt:  Regular  Lungs:  Fairly clear bilaterally  Abd:  Soft; BS active; non-tender; incisions healing well; BRIGIDO drain with minimal serous drainage  Skin:  Warm/dry    Patient Active Problem List    Diagnosis Date Noted    Abdominal pain 11/18/2021    Acute duodenal ulcer with perforation ([address] 11/18/2021    Type 2 diabetes mellitus with hyperglycemia, without long-term current use of insulin ([address] 11/18/2021    Delirium 11/18/2021    Acute renal disease 11/18/2021       S/p lap modified Salvadore William patch repair of perforated duodenal ulcer--pain control with dilaudid  Monitor drain ouptut  Hyperglycemia--SSI  HTN--lopressor   Acute blood loss anemia--monitor H/H  Hypoalbuminemia--NPO for now; upper GI prior to starting po  Elevated LFTs--monitor labs  DVT risk--PCDs/lovenox  PT/OT aisha Rudd MD, FACS  11/19/2021  1:22 PM

## 2021-11-19 NOTE — CARE COORDINATION
Pod #1 s/p modified Jose Roberto patch for perforated duodenal ulcer. O2 9L high flow. Met with pt at bedside. He is slightly short of breath with conversation. Pt lives alone in a 1st floor apt with no steps to enter. He is independent in adl's, including driving. He does not own any dme. Pcp Is Dr Jossie Olivera in [facility]. Preferred pharmacy is [email or web address] [address] in [facility]. Pt has used [facility] in the past. Discussed possible hhc vs rehab when discharged. Pt doesn't feel it will be needed. Will re address closer to discharge. Plan at this time is to return home. Pt reports his sister in law can assist him. Will benefit from pt/ot evals when appropriate.

## 2021-11-19 NOTE — PLAN OF CARE
Problem: Pain:  Description: Pain management should include both nonpharmacologic and pharmacologic interventions.   Goal: Pain level will decrease  11/19/2021 1324 by Chencho Petty RN  Outcome: Met This Shift  11/19/2021 0108 by Felecia Byrd RN  Outcome: Met This Shift  Goal: Control of acute pain  11/19/2021 1324 by Chencho Petty RN  Outcome: Met This Shift  11/19/2021 0108 by Felecia Byrd RN  Outcome: Met This Shift  Goal: Control of chronic pain  11/19/2021 0108 by Felecia Byrd RN  Outcome: Met This Shift     Problem: Confusion - Acute:  Goal: Absence of continued neurological deterioration signs and symptoms  11/19/2021 1324 by Chencho Petty RN  Outcome: Met This Shift  11/19/2021 0108 by Felecia Byrd RN  Outcome: Met This Shift  Goal: Mental status will be restored to baseline  11/19/2021 1324 by Chencho Petty RN  Outcome: Met This Shift  11/19/2021 0108 by Felecia Byrd RN  Outcome: Met This Shift     Problem: Discharge Planning:  Goal: Ability to perform activities of daily living will improve  11/19/2021 1324 by Chencho Petty RN  Outcome: Met This Shift  11/19/2021 0108 by Felecia Byrd RN  Outcome: Met This Shift  Goal: Participates in care planning  11/19/2021 0108 by Felecia Byrd RN  Outcome: Met This Shift     Problem: Injury - Risk of, Physical Injury:  Goal: Absence of physical injury  11/19/2021 1324 by Chencho Petty RN  Outcome: Met This Shift  11/19/2021 0108 by Felecia Byrd RN  Outcome: Met This Shift  Goal: Will remain free from falls  11/19/2021 1324 by Chencho Petty RN  Outcome: Met This Shift  11/19/2021 0108 by Felecia Byrd RN  Outcome: Met This Shift     Problem: Mood - Altered:  Goal: Mood stable  11/19/2021 1324 by Chencho Petty RN  Outcome: Met This Shift  11/19/2021 0108 by Felecia Byrd RN  Outcome: Met This Shift  Goal: Absence of abusive behavior  11/19/2021 1324 by Chencho Petty RN  Outcome: Met This Shift  11/19/2021 0108 by Carol Dumont RN  Outcome: Met This Shift  Goal: Verbalizations of feeling emotionally comfortable while being cared for will increase  11/19/2021 0108 by Carol Dumont RN  Outcome: Met This Shift     Problem: Psychomotor Activity - Altered:  Goal: Absence of psychomotor disturbance signs and symptoms  11/19/2021 0108 by Carol Dumont RN  Outcome: Met This Shift     Problem: Sensory Perception - Impaired:  Goal: Demonstrations of improved sensory functioning will increase  11/19/2021 0108 by Carol Dumont RN  Outcome: Met This Shift  Goal: Decrease in sensory misperception frequency  11/19/2021 0108 by Carol Dumont RN  Outcome: Met This Shift  Goal: Able to refrain from responding to false sensory perceptions  11/19/2021 0108 by Carol Dumont RN  Outcome: Met This Shift  Goal: Demonstrates accurate environmental perceptions  11/19/2021 0108 by Carol Dumont RN  Outcome: Met This Shift  Goal: Able to distinguish between reality-based and nonreality-based thinking  11/19/2021 0108 by Carol Dumont RN  Outcome: Met This Shift     Problem: Sleep Pattern Disturbance:  Goal: Appears well-rested  11/19/2021 0108 by Carol Dumont RN  Outcome: Met This Shift     Problem: Non-Violent Restraints  Goal: Removal from restraints as soon as assessed to be safe  11/19/2021 1324 by Jeremy Garcia RN  Outcome: Completed  11/19/2021 0108 by Caorl Dumont RN  Outcome: Met This Shift  Goal: No harm/injury to patient while restraints in use  11/19/2021 1324 by Jeremy Garcia RN  Outcome: Completed  11/19/2021 0108 by Carol Dumont RN  Outcome: Met This Shift  Goal: Patient's dignity will be maintained  11/19/2021 1324 by Jeremy Garcia RN  Outcome: Completed  11/19/2021 0108 by Carol Dumont RN  Outcome: Met This Shift     Problem:  Activity:  Goal: Risk for activity intolerance will decrease  11/19/2021 1324 by Jeremy Garcia RN  Outcome: Met This Shift  11/19/2021 0108 by Felecia Byrd RN  Outcome: Met This Shift     Problem:  Bowel/Gastric:  Goal: Bowel function will improve  11/19/2021 0108 by Felecia Byrd RN  Outcome: Not Met This Shift  Goal: Diagnostic test results will improve  11/19/2021 0108 by Felecia Byrd RN  Outcome: Met This Shift  Goal: Occurrences of nausea will decrease  11/19/2021 0108 by Felecia Byrd RN  Outcome: Met This Shift  Goal: Occurrences of vomiting will decrease  11/19/2021 0108 by Felecia Byrd RN  Outcome: Met This Shift     Problem: Fluid Volume:  Goal: Maintenance of adequate hydration will improve  11/19/2021 0108 by Felecia Byrd RN  Outcome: Met This Shift     Problem: Health Behavior:  Goal: Ability to state signs and symptoms to report to health care provider will improve  11/19/2021 0108 by Felecia Byrd RN  Outcome: Met This Shift     Problem: Physical Regulation:  Goal: Complications related to the disease process, condition or treatment will be avoided or minimized  11/19/2021 0108 by Felecia Byrd RN  Outcome: Met This Shift  Goal: Ability to maintain clinical measurements within normal limits will improve  11/19/2021 0108 by Felecia Byrd RN  Outcome: Met This Shift     Problem: Sensory:  Goal: Pain level will decrease  11/19/2021 1324 by Chencho Petty RN  Outcome: Met This Shift  11/19/2021 0108 by Felecia Byrd RN  Outcome: Met This Shift  Goal: Ability to identify factors that increase the pain will improve  11/19/2021 0108 by Felecia Byrd RN  Outcome: Met This Shift  Goal: Ability to notify healthcare provider of pain before it becomes unmanageable or unbearable will improve  11/19/2021 0108 by Felecia Byrd RN  Outcome: Met This Shift     Problem: Falls - Risk of:  Goal: Absence of physical injury  11/19/2021 1324 by Chencho Petty RN  Outcome: Met This Shift  11/19/2021 0108 by Felecia Byrd RN  Outcome: Met This Shift  Goal: Will remain free from falls  11/19/2021 1324 by Chencho Petty DISPLAY PLAN FREE TEXT

## 2021-11-20 LAB
ALBUMIN SERPL-MCNC: 2.8 G/DL (ref 3.5–5.2)
ALP BLD-CCNC: 34 U/L (ref 40–129)
ALT SERPL-CCNC: 23 U/L (ref 0–40)
ANION GAP SERPL CALCULATED.3IONS-SCNC: 11 MMOL/L (ref 7–16)
AST SERPL-CCNC: 22 U/L (ref 0–39)
BASOPHILS ABSOLUTE: 0.03 E9/L (ref 0–0.2)
BASOPHILS RELATIVE PERCENT: 0.2 % (ref 0–2)
BILIRUB SERPL-MCNC: 0.5 MG/DL (ref 0–1.2)
BUN BLDV-MCNC: 21 MG/DL (ref 6–23)
CALCIUM SERPL-MCNC: 8.4 MG/DL (ref 8.6–10.2)
CHLORIDE BLD-SCNC: 106 MMOL/L (ref 98–107)
CO2: 24 MMOL/L (ref 22–29)
CREAT SERPL-MCNC: 1.1 MG/DL (ref 0.7–1.2)
EOSINOPHILS ABSOLUTE: 0 E9/L (ref 0.05–0.5)
EOSINOPHILS RELATIVE PERCENT: 0 % (ref 0–6)
GFR AFRICAN AMERICAN: >60
GFR NON-AFRICAN AMERICAN: >60 ML/MIN/1.73
GLUCOSE BLD-MCNC: 88 MG/DL (ref 74–99)
HCT VFR BLD CALC: 35.2 % (ref 37–54)
HEMOGLOBIN: 10.7 G/DL (ref 12.5–16.5)
IMMATURE GRANULOCYTES #: 0.09 E9/L
IMMATURE GRANULOCYTES %: 0.7 % (ref 0–5)
LYMPHOCYTES ABSOLUTE: 0.7 E9/L (ref 1.5–4)
LYMPHOCYTES RELATIVE PERCENT: 5.1 % (ref 20–42)
MCH RBC QN AUTO: 22.9 PG (ref 26–35)
MCHC RBC AUTO-ENTMCNC: 30.4 % (ref 32–34.5)
MCV RBC AUTO: 75.2 FL (ref 80–99.9)
METER GLUCOSE: 123 MG/DL (ref 74–99)
METER GLUCOSE: 123 MG/DL (ref 74–99)
METER GLUCOSE: 76 MG/DL (ref 74–99)
METER GLUCOSE: 99 MG/DL (ref 74–99)
MONOCYTES ABSOLUTE: 0.98 E9/L (ref 0.1–0.95)
MONOCYTES RELATIVE PERCENT: 7.1 % (ref 2–12)
MRSA CULTURE ONLY: NORMAL
NEUTROPHILS ABSOLUTE: 12.04 E9/L (ref 1.8–7.3)
NEUTROPHILS RELATIVE PERCENT: 86.9 % (ref 43–80)
PDW BLD-RTO: 15 FL (ref 11.5–15)
PLATELET # BLD: 185 E9/L (ref 130–450)
PMV BLD AUTO: 10.4 FL (ref 7–12)
POTASSIUM REFLEX MAGNESIUM: 3.6 MMOL/L (ref 3.5–5)
RBC # BLD: 4.68 E12/L (ref 3.8–5.8)
SODIUM BLD-SCNC: 141 MMOL/L (ref 132–146)
TOTAL PROTEIN: 5.9 G/DL (ref 6.4–8.3)
URINE CULTURE, ROUTINE: NORMAL
WBC # BLD: 13.8 E9/L (ref 4.5–11.5)

## 2021-11-20 PROCEDURE — C9113 INJ PANTOPRAZOLE SODIUM, VIA: HCPCS | Performed by: STUDENT IN AN ORGANIZED HEALTH CARE EDUCATION/TRAINING PROGRAM

## 2021-11-20 PROCEDURE — 6360000002 HC RX W HCPCS: Performed by: STUDENT IN AN ORGANIZED HEALTH CARE EDUCATION/TRAINING PROGRAM

## 2021-11-20 PROCEDURE — 36415 COLL VENOUS BLD VENIPUNCTURE: CPT

## 2021-11-20 PROCEDURE — 94669 MECHANICAL CHEST WALL OSCILL: CPT

## 2021-11-20 PROCEDURE — 1200000000 HC SEMI PRIVATE

## 2021-11-20 PROCEDURE — 82962 GLUCOSE BLOOD TEST: CPT

## 2021-11-20 PROCEDURE — 99024 POSTOP FOLLOW-UP VISIT: CPT | Performed by: SURGERY

## 2021-11-20 PROCEDURE — 2700000000 HC OXYGEN THERAPY PER DAY

## 2021-11-20 PROCEDURE — 2500000003 HC RX 250 WO HCPCS: Performed by: STUDENT IN AN ORGANIZED HEALTH CARE EDUCATION/TRAINING PROGRAM

## 2021-11-20 PROCEDURE — 80053 COMPREHEN METABOLIC PANEL: CPT

## 2021-11-20 PROCEDURE — 2580000003 HC RX 258: Performed by: STUDENT IN AN ORGANIZED HEALTH CARE EDUCATION/TRAINING PROGRAM

## 2021-11-20 PROCEDURE — 85025 COMPLETE CBC W/AUTO DIFF WBC: CPT

## 2021-11-20 RX ADMIN — PANTOPRAZOLE SODIUM 40 MG: 40 INJECTION, POWDER, FOR SOLUTION INTRAVENOUS at 20:18

## 2021-11-20 RX ADMIN — PIPERACILLIN AND TAZOBACTAM 3375 MG: 3; .375 INJECTION, POWDER, LYOPHILIZED, FOR SOLUTION INTRAVENOUS at 22:31

## 2021-11-20 RX ADMIN — METOPROLOL TARTRATE 10 MG: 1 INJECTION, SOLUTION INTRAVENOUS at 23:35

## 2021-11-20 RX ADMIN — SODIUM CHLORIDE, PRESERVATIVE FREE 10 ML: 5 INJECTION INTRAVENOUS at 20:18

## 2021-11-20 RX ADMIN — METOPROLOL TARTRATE 10 MG: 1 INJECTION, SOLUTION INTRAVENOUS at 16:10

## 2021-11-20 RX ADMIN — PANTOPRAZOLE SODIUM 40 MG: 40 INJECTION, POWDER, FOR SOLUTION INTRAVENOUS at 08:11

## 2021-11-20 RX ADMIN — FLUCONAZOLE IN SODIUM CHLORIDE 400 MG: 2 INJECTION, SOLUTION INTRAVENOUS at 22:30

## 2021-11-20 RX ADMIN — ONDANSETRON 4 MG: 2 INJECTION INTRAMUSCULAR; INTRAVENOUS at 10:24

## 2021-11-20 RX ADMIN — SODIUM CHLORIDE, PRESERVATIVE FREE 10 ML: 5 INJECTION INTRAVENOUS at 23:36

## 2021-11-20 RX ADMIN — SODIUM CHLORIDE, PRESERVATIVE FREE 10 ML: 5 INJECTION INTRAVENOUS at 08:11

## 2021-11-20 RX ADMIN — METOPROLOL TARTRATE 10 MG: 1 INJECTION, SOLUTION INTRAVENOUS at 10:06

## 2021-11-20 RX ADMIN — PIPERACILLIN AND TAZOBACTAM 3375 MG: 3; .375 INJECTION, POWDER, LYOPHILIZED, FOR SOLUTION INTRAVENOUS at 14:15

## 2021-11-20 RX ADMIN — SODIUM CHLORIDE, POTASSIUM CHLORIDE, SODIUM LACTATE AND CALCIUM CHLORIDE: 600; 310; 30; 20 INJECTION, SOLUTION INTRAVENOUS at 14:15

## 2021-11-20 RX ADMIN — PIPERACILLIN AND TAZOBACTAM 3375 MG: 3; .375 INJECTION, POWDER, LYOPHILIZED, FOR SOLUTION INTRAVENOUS at 05:10

## 2021-11-20 RX ADMIN — METOPROLOL TARTRATE 10 MG: 1 INJECTION, SOLUTION INTRAVENOUS at 05:10

## 2021-11-20 RX ADMIN — ENOXAPARIN SODIUM 40 MG: 100 INJECTION SUBCUTANEOUS at 08:10

## 2021-11-20 ASSESSMENT — PAIN SCALES - GENERAL: PAINLEVEL_OUTOF10: 0

## 2021-11-20 NOTE — PLAN OF CARE
Problem: Pain:  Goal: Pain level will decrease  Description: Pain level will decrease  11/20/2021 0012 by Mio Liu RN  Outcome: Met This Shift     Problem: Pain:  Goal: Control of acute pain  Description: Control of acute pain  11/20/2021 0012 by Mio Liu RN  Outcome: Met This Shift     Problem: Injury - Risk of, Physical Injury:  Goal: Absence of physical injury  Description: Absence of physical injury  11/20/2021 0012 by Mio Lui RN  Outcome: Met This Shift     Problem: Injury - Risk of, Physical Injury:  Goal: Will remain free from falls  Description: Will remain free from falls  11/20/2021 0012 by Mio Liu RN  Outcome: Met This Shift

## 2021-11-21 ENCOUNTER — APPOINTMENT (OUTPATIENT)
Dept: GENERAL RADIOLOGY | Age: 81
DRG: 329 | End: 2021-11-21
Payer: MEDICARE

## 2021-11-21 LAB
ALBUMIN SERPL-MCNC: 2.8 G/DL (ref 3.5–5.2)
ALP BLD-CCNC: 27 U/L (ref 40–129)
ALT SERPL-CCNC: 20 U/L (ref 0–40)
ANION GAP SERPL CALCULATED.3IONS-SCNC: 10 MMOL/L (ref 7–16)
AST SERPL-CCNC: 22 U/L (ref 0–39)
BASOPHILS ABSOLUTE: 0.02 E9/L (ref 0–0.2)
BASOPHILS RELATIVE PERCENT: 0.1 % (ref 0–2)
BILIRUB SERPL-MCNC: 0.4 MG/DL (ref 0–1.2)
BODY FLUID CULTURE, STERILE: NORMAL
BUN BLDV-MCNC: 23 MG/DL (ref 6–23)
CALCIUM SERPL-MCNC: 8.9 MG/DL (ref 8.6–10.2)
CHLORIDE BLD-SCNC: 109 MMOL/L (ref 98–107)
CO2: 25 MMOL/L (ref 22–29)
CREAT SERPL-MCNC: 1 MG/DL (ref 0.7–1.2)
EOSINOPHILS ABSOLUTE: 0.01 E9/L (ref 0.05–0.5)
EOSINOPHILS RELATIVE PERCENT: 0.1 % (ref 0–6)
GFR AFRICAN AMERICAN: >60
GFR NON-AFRICAN AMERICAN: >60 ML/MIN/1.73
GLUCOSE BLD-MCNC: 129 MG/DL (ref 74–99)
GRAM STAIN RESULT: NORMAL
HCT VFR BLD CALC: 30.8 % (ref 37–54)
HEMOGLOBIN: 9.4 G/DL (ref 12.5–16.5)
IMMATURE GRANULOCYTES #: 0.1 E9/L
IMMATURE GRANULOCYTES %: 0.7 % (ref 0–5)
LYMPHOCYTES ABSOLUTE: 0.94 E9/L (ref 1.5–4)
LYMPHOCYTES RELATIVE PERCENT: 6.5 % (ref 20–42)
MAGNESIUM: 2.5 MG/DL (ref 1.6–2.6)
MCH RBC QN AUTO: 22.6 PG (ref 26–35)
MCHC RBC AUTO-ENTMCNC: 30.5 % (ref 32–34.5)
MCV RBC AUTO: 74 FL (ref 80–99.9)
METER GLUCOSE: 116 MG/DL (ref 74–99)
METER GLUCOSE: 121 MG/DL (ref 74–99)
METER GLUCOSE: 132 MG/DL (ref 74–99)
METER GLUCOSE: 138 MG/DL (ref 74–99)
METER GLUCOSE: 171 MG/DL (ref 74–99)
METER GLUCOSE: 99 MG/DL (ref 74–99)
MONOCYTES ABSOLUTE: 1.21 E9/L (ref 0.1–0.95)
MONOCYTES RELATIVE PERCENT: 8.4 % (ref 2–12)
NEUTROPHILS ABSOLUTE: 12.14 E9/L (ref 1.8–7.3)
NEUTROPHILS RELATIVE PERCENT: 84.2 % (ref 43–80)
PDW BLD-RTO: 15 FL (ref 11.5–15)
PLATELET # BLD: 183 E9/L (ref 130–450)
PMV BLD AUTO: 10.8 FL (ref 7–12)
POTASSIUM REFLEX MAGNESIUM: 3.5 MMOL/L (ref 3.5–5)
RBC # BLD: 4.16 E12/L (ref 3.8–5.8)
SODIUM BLD-SCNC: 144 MMOL/L (ref 132–146)
TOTAL PROTEIN: 5.7 G/DL (ref 6.4–8.3)
WBC # BLD: 14.4 E9/L (ref 4.5–11.5)

## 2021-11-21 PROCEDURE — 83735 ASSAY OF MAGNESIUM: CPT

## 2021-11-21 PROCEDURE — 82962 GLUCOSE BLOOD TEST: CPT

## 2021-11-21 PROCEDURE — 2500000003 HC RX 250 WO HCPCS: Performed by: STUDENT IN AN ORGANIZED HEALTH CARE EDUCATION/TRAINING PROGRAM

## 2021-11-21 PROCEDURE — 80053 COMPREHEN METABOLIC PANEL: CPT

## 2021-11-21 PROCEDURE — 74240 X-RAY XM UPR GI TRC 1CNTRST: CPT

## 2021-11-21 PROCEDURE — 6360000002 HC RX W HCPCS: Performed by: STUDENT IN AN ORGANIZED HEALTH CARE EDUCATION/TRAINING PROGRAM

## 2021-11-21 PROCEDURE — 2700000000 HC OXYGEN THERAPY PER DAY

## 2021-11-21 PROCEDURE — 36415 COLL VENOUS BLD VENIPUNCTURE: CPT

## 2021-11-21 PROCEDURE — C9113 INJ PANTOPRAZOLE SODIUM, VIA: HCPCS | Performed by: STUDENT IN AN ORGANIZED HEALTH CARE EDUCATION/TRAINING PROGRAM

## 2021-11-21 PROCEDURE — 85025 COMPLETE CBC W/AUTO DIFF WBC: CPT

## 2021-11-21 PROCEDURE — 2580000003 HC RX 258: Performed by: STUDENT IN AN ORGANIZED HEALTH CARE EDUCATION/TRAINING PROGRAM

## 2021-11-21 PROCEDURE — 6370000000 HC RX 637 (ALT 250 FOR IP): Performed by: STUDENT IN AN ORGANIZED HEALTH CARE EDUCATION/TRAINING PROGRAM

## 2021-11-21 PROCEDURE — 6360000004 HC RX CONTRAST MEDICATION: Performed by: SURGERY

## 2021-11-21 PROCEDURE — 1200000000 HC SEMI PRIVATE

## 2021-11-21 PROCEDURE — 99024 POSTOP FOLLOW-UP VISIT: CPT | Performed by: SURGERY

## 2021-11-21 RX ADMIN — PIPERACILLIN AND TAZOBACTAM 3375 MG: 3; .375 INJECTION, POWDER, LYOPHILIZED, FOR SOLUTION INTRAVENOUS at 05:03

## 2021-11-21 RX ADMIN — METOPROLOL TARTRATE 10 MG: 1 INJECTION, SOLUTION INTRAVENOUS at 16:26

## 2021-11-21 RX ADMIN — PIPERACILLIN AND TAZOBACTAM 3375 MG: 3; .375 INJECTION, POWDER, LYOPHILIZED, FOR SOLUTION INTRAVENOUS at 21:10

## 2021-11-21 RX ADMIN — PANTOPRAZOLE SODIUM 40 MG: 40 INJECTION, POWDER, FOR SOLUTION INTRAVENOUS at 10:20

## 2021-11-21 RX ADMIN — INSULIN LISPRO 3 UNITS: 100 INJECTION, SOLUTION INTRAVENOUS; SUBCUTANEOUS at 18:19

## 2021-11-21 RX ADMIN — ENOXAPARIN SODIUM 40 MG: 100 INJECTION SUBCUTANEOUS at 10:20

## 2021-11-21 RX ADMIN — SODIUM CHLORIDE, PRESERVATIVE FREE 10 ML: 5 INJECTION INTRAVENOUS at 10:22

## 2021-11-21 RX ADMIN — FLUCONAZOLE IN SODIUM CHLORIDE 400 MG: 2 INJECTION, SOLUTION INTRAVENOUS at 21:09

## 2021-11-21 RX ADMIN — SODIUM CHLORIDE, PRESERVATIVE FREE 10 ML: 5 INJECTION INTRAVENOUS at 21:11

## 2021-11-21 RX ADMIN — SODIUM CHLORIDE, POTASSIUM CHLORIDE, SODIUM LACTATE AND CALCIUM CHLORIDE: 600; 310; 30; 20 INJECTION, SOLUTION INTRAVENOUS at 07:40

## 2021-11-21 RX ADMIN — PIPERACILLIN AND TAZOBACTAM 3375 MG: 3; .375 INJECTION, POWDER, LYOPHILIZED, FOR SOLUTION INTRAVENOUS at 13:58

## 2021-11-21 RX ADMIN — METOPROLOL TARTRATE 10 MG: 1 INJECTION, SOLUTION INTRAVENOUS at 21:09

## 2021-11-21 RX ADMIN — SODIUM CHLORIDE, POTASSIUM CHLORIDE, SODIUM LACTATE AND CALCIUM CHLORIDE: 600; 310; 30; 20 INJECTION, SOLUTION INTRAVENOUS at 21:08

## 2021-11-21 RX ADMIN — PANTOPRAZOLE SODIUM 40 MG: 40 INJECTION, POWDER, FOR SOLUTION INTRAVENOUS at 21:09

## 2021-11-21 RX ADMIN — DIATRIZOATE MEGLUMINE AND DIATRIZOATE SODIUM 120 ML: 660; 100 LIQUID ORAL; RECTAL at 11:14

## 2021-11-21 RX ADMIN — SODIUM CHLORIDE, PRESERVATIVE FREE 10 ML: 5 INJECTION INTRAVENOUS at 21:09

## 2021-11-21 RX ADMIN — METOPROLOL TARTRATE 10 MG: 1 INJECTION, SOLUTION INTRAVENOUS at 10:20

## 2021-11-21 ASSESSMENT — PAIN SCALES - GENERAL
PAINLEVEL_OUTOF10: 0

## 2021-11-21 NOTE — PLAN OF CARE
Problem: Pain:  Goal: Pain level will decrease  Description: Pain level will decrease  Outcome: Met This Shift  Goal: Control of acute pain  Description: Control of acute pain  Outcome: Met This Shift  Goal: Control of chronic pain  Description: Control of chronic pain  Outcome: Met This Shift     Problem: Confusion - Acute:  Goal: Absence of continued neurological deterioration signs and symptoms  Description: Absence of continued neurological deterioration signs and symptoms  Outcome: Met This Shift  Goal: Mental status will be restored to baseline  Description: Mental status will be restored to baseline  Outcome: Met This Shift     Problem: Discharge Planning:  Goal: Ability to perform activities of daily living will improve  Description: Ability to perform activities of daily living will improve  Outcome: Met This Shift  Goal: Participates in care planning  Description: Participates in care planning  Outcome: Met This Shift     Problem: Injury - Risk of, Physical Injury:  Goal: Absence of physical injury  Description: Absence of physical injury  Outcome: Met This Shift  Goal: Will remain free from falls  Description: Will remain free from falls  Outcome: Met This Shift     Problem: Psychomotor Activity - Altered:  Goal: Absence of psychomotor disturbance signs and symptoms  Description: Absence of psychomotor disturbance signs and symptoms  Outcome: Met This Shift     Problem: Sensory Perception - Impaired:  Goal: Demonstrations of improved sensory functioning will increase  Description: Demonstrations of improved sensory functioning will increase  Outcome: Met This Shift  Goal: Decrease in sensory misperception frequency  Description: Decrease in sensory misperception frequency  Outcome: Met This Shift  Goal: Able to refrain from responding to false sensory perceptions  Description: Able to refrain from responding to false sensory perceptions  Outcome: Met This Shift  Goal: Demonstrates accurate environmental perceptions  Description: Demonstrates accurate environmental perceptions  Outcome: Met This Shift  Goal: Able to distinguish between reality-based and nonreality-based thinking  Description: Able to distinguish between reality-based and nonreality-based thinking  Outcome: Met This Shift  Goal: Able to interrupt nonreality-based thinking  Description: Able to interrupt nonreality-based thinking  Outcome: Met This Shift     Problem: Sleep Pattern Disturbance:  Goal: Appears well-rested  Description: Appears well-rested  Outcome: Met This Shift     Problem: Activity:  Goal: Risk for activity intolerance will decrease  Description: Risk for activity intolerance will decrease  Outcome: Met This Shift     Problem:  Bowel/Gastric:  Goal: Bowel function will improve  Description: Bowel function will improve  Outcome: Met This Shift  Goal: Diagnostic test results will improve  Description: Diagnostic test results will improve  Outcome: Met This Shift  Goal: Occurrences of nausea will decrease  Description: Occurrences of nausea will decrease  Outcome: Met This Shift  Goal: Occurrences of vomiting will decrease  Description: Occurrences of vomiting will decrease  Outcome: Met This Shift     Problem: Fluid Volume:  Goal: Maintenance of adequate hydration will improve  Description: Maintenance of adequate hydration will improve  Outcome: Met This Shift     Problem: Health Behavior:  Goal: Ability to state signs and symptoms to report to health care provider will improve  Description: Ability to state signs and symptoms to report to health care provider will improve  Outcome: Met This Shift     Problem: Physical Regulation:  Goal: Complications related to the disease process, condition or treatment will be avoided or minimized  Description: Complications related to the disease process, condition or treatment will be avoided or minimized  Outcome: Met This Shift  Goal: Ability to maintain clinical measurements within normal limits will improve  Description: Ability to maintain clinical measurements within normal limits will improve  Outcome: Met This Shift     Problem: Sensory:  Goal: Pain level will decrease  Description: Pain level will decrease  Outcome: Met This Shift  Goal: Ability to identify factors that increase the pain will improve  Description: Ability to identify factors that increase the pain will improve  Outcome: Met This Shift  Goal: Ability to notify healthcare provider of pain before it becomes unmanageable or unbearable will improve  Description: Ability to notify healthcare provider of pain before it becomes unmanageable or unbearable will improve  Outcome: Met This Shift     Problem: Falls - Risk of:  Goal: Absence of physical injury  Description: Absence of physical injury  Outcome: Met This Shift  Goal: Will remain free from falls  Description: Will remain free from falls  Outcome: Met This Shift     Problem: Skin Integrity:  Goal: Will show no infection signs and symptoms  Description: Will show no infection signs and symptoms  Outcome: Met This Shift  Goal: Absence of new skin breakdown  Description: Absence of new skin breakdown  Outcome: Met This Shift

## 2021-11-22 PROBLEM — K26.5 DUODENAL ULCER PERFORATION (HCC): Status: ACTIVE | Noted: 2021-11-18

## 2021-11-22 LAB
ALBUMIN SERPL-MCNC: 2.8 G/DL (ref 3.5–5.2)
ALP BLD-CCNC: 29 U/L (ref 40–129)
ALT SERPL-CCNC: 24 U/L (ref 0–40)
ANION GAP SERPL CALCULATED.3IONS-SCNC: 16 MMOL/L (ref 7–16)
AST SERPL-CCNC: 29 U/L (ref 0–39)
BASOPHILS ABSOLUTE: 0.02 E9/L (ref 0–0.2)
BASOPHILS RELATIVE PERCENT: 0.2 % (ref 0–2)
BILIRUB SERPL-MCNC: 0.5 MG/DL (ref 0–1.2)
BUN BLDV-MCNC: 15 MG/DL (ref 6–23)
CALCIUM SERPL-MCNC: 8.1 MG/DL (ref 8.6–10.2)
CHLORIDE BLD-SCNC: 102 MMOL/L (ref 98–107)
CO2: 21 MMOL/L (ref 22–29)
CREAT SERPL-MCNC: 0.9 MG/DL (ref 0.7–1.2)
EOSINOPHILS ABSOLUTE: 0.17 E9/L (ref 0.05–0.5)
EOSINOPHILS RELATIVE PERCENT: 1.5 % (ref 0–6)
GFR AFRICAN AMERICAN: >60
GFR NON-AFRICAN AMERICAN: >60 ML/MIN/1.73
GLUCOSE BLD-MCNC: 288 MG/DL (ref 74–99)
HCT VFR BLD CALC: 30.4 % (ref 37–54)
HEMOGLOBIN: 9.4 G/DL (ref 12.5–16.5)
IMMATURE GRANULOCYTES #: 0.23 E9/L
IMMATURE GRANULOCYTES %: 2 % (ref 0–5)
LYMPHOCYTES ABSOLUTE: 0.88 E9/L (ref 1.5–4)
LYMPHOCYTES RELATIVE PERCENT: 7.6 % (ref 20–42)
MAGNESIUM: 2.3 MG/DL (ref 1.6–2.6)
MCH RBC QN AUTO: 23.5 PG (ref 26–35)
MCHC RBC AUTO-ENTMCNC: 30.9 % (ref 32–34.5)
MCV RBC AUTO: 76 FL (ref 80–99.9)
METER GLUCOSE: 113 MG/DL (ref 74–99)
METER GLUCOSE: 127 MG/DL (ref 74–99)
METER GLUCOSE: 129 MG/DL (ref 74–99)
METER GLUCOSE: 155 MG/DL (ref 74–99)
MONOCYTES ABSOLUTE: 1.35 E9/L (ref 0.1–0.95)
MONOCYTES RELATIVE PERCENT: 11.7 % (ref 2–12)
NEUTROPHILS ABSOLUTE: 8.91 E9/L (ref 1.8–7.3)
NEUTROPHILS RELATIVE PERCENT: 77 % (ref 43–80)
PDW BLD-RTO: 15 FL (ref 11.5–15)
PLATELET # BLD: 199 E9/L (ref 130–450)
PMV BLD AUTO: 10.8 FL (ref 7–12)
POTASSIUM REFLEX MAGNESIUM: 3.2 MMOL/L (ref 3.5–5)
RBC # BLD: 4 E12/L (ref 3.8–5.8)
SODIUM BLD-SCNC: 139 MMOL/L (ref 132–146)
TOTAL PROTEIN: 6 G/DL (ref 6.4–8.3)
WBC # BLD: 11.6 E9/L (ref 4.5–11.5)

## 2021-11-22 PROCEDURE — 6360000002 HC RX W HCPCS: Performed by: STUDENT IN AN ORGANIZED HEALTH CARE EDUCATION/TRAINING PROGRAM

## 2021-11-22 PROCEDURE — 85025 COMPLETE CBC W/AUTO DIFF WBC: CPT

## 2021-11-22 PROCEDURE — 80053 COMPREHEN METABOLIC PANEL: CPT

## 2021-11-22 PROCEDURE — 2700000000 HC OXYGEN THERAPY PER DAY

## 2021-11-22 PROCEDURE — 2580000003 HC RX 258: Performed by: STUDENT IN AN ORGANIZED HEALTH CARE EDUCATION/TRAINING PROGRAM

## 2021-11-22 PROCEDURE — 83735 ASSAY OF MAGNESIUM: CPT

## 2021-11-22 PROCEDURE — 6370000000 HC RX 637 (ALT 250 FOR IP): Performed by: SURGERY

## 2021-11-22 PROCEDURE — 6370000000 HC RX 637 (ALT 250 FOR IP)

## 2021-11-22 PROCEDURE — 94664 DEMO&/EVAL PT USE INHALER: CPT

## 2021-11-22 PROCEDURE — 94640 AIRWAY INHALATION TREATMENT: CPT

## 2021-11-22 PROCEDURE — 1200000000 HC SEMI PRIVATE

## 2021-11-22 PROCEDURE — 6370000000 HC RX 637 (ALT 250 FOR IP): Performed by: NURSE PRACTITIONER

## 2021-11-22 PROCEDURE — 2500000003 HC RX 250 WO HCPCS: Performed by: STUDENT IN AN ORGANIZED HEALTH CARE EDUCATION/TRAINING PROGRAM

## 2021-11-22 PROCEDURE — 36415 COLL VENOUS BLD VENIPUNCTURE: CPT

## 2021-11-22 PROCEDURE — 82962 GLUCOSE BLOOD TEST: CPT

## 2021-11-22 PROCEDURE — C9113 INJ PANTOPRAZOLE SODIUM, VIA: HCPCS | Performed by: STUDENT IN AN ORGANIZED HEALTH CARE EDUCATION/TRAINING PROGRAM

## 2021-11-22 PROCEDURE — 6370000000 HC RX 637 (ALT 250 FOR IP): Performed by: STUDENT IN AN ORGANIZED HEALTH CARE EDUCATION/TRAINING PROGRAM

## 2021-11-22 PROCEDURE — 97165 OT EVAL LOW COMPLEX 30 MIN: CPT

## 2021-11-22 PROCEDURE — 99233 SBSQ HOSP IP/OBS HIGH 50: CPT | Performed by: SURGERY

## 2021-11-22 RX ORDER — PANTOPRAZOLE SODIUM 40 MG/1
40 TABLET, DELAYED RELEASE ORAL
Status: DISCONTINUED | OUTPATIENT
Start: 2021-11-22 | End: 2021-11-24 | Stop reason: HOSPADM

## 2021-11-22 RX ORDER — AMLODIPINE BESYLATE 10 MG/1
10 TABLET ORAL DAILY
Status: DISCONTINUED | OUTPATIENT
Start: 2021-11-22 | End: 2021-11-24 | Stop reason: HOSPADM

## 2021-11-22 RX ORDER — METOPROLOL SUCCINATE 100 MG/1
500 TABLET, EXTENDED RELEASE ORAL DAILY
Status: DISCONTINUED | OUTPATIENT
Start: 2021-11-22 | End: 2021-11-22 | Stop reason: CLARIF

## 2021-11-22 RX ORDER — OXYCODONE HYDROCHLORIDE 5 MG/1
5 TABLET ORAL EVERY 4 HOURS PRN
Status: DISCONTINUED | OUTPATIENT
Start: 2021-11-22 | End: 2021-11-24 | Stop reason: HOSPADM

## 2021-11-22 RX ORDER — METOPROLOL SUCCINATE 50 MG/1
50 TABLET, EXTENDED RELEASE ORAL DAILY
Status: DISCONTINUED | OUTPATIENT
Start: 2021-11-22 | End: 2021-11-24 | Stop reason: HOSPADM

## 2021-11-22 RX ORDER — CALCIUM POLYCARBOPHIL 625 MG 625 MG/1
625 TABLET ORAL 2 TIMES DAILY
Status: DISCONTINUED | OUTPATIENT
Start: 2021-11-22 | End: 2021-11-24 | Stop reason: HOSPADM

## 2021-11-22 RX ORDER — IPRATROPIUM BROMIDE AND ALBUTEROL SULFATE 2.5; .5 MG/3ML; MG/3ML
1 SOLUTION RESPIRATORY (INHALATION)
Status: DISCONTINUED | OUTPATIENT
Start: 2021-11-22 | End: 2021-11-24 | Stop reason: HOSPADM

## 2021-11-22 RX ORDER — OXYCODONE HYDROCHLORIDE 10 MG/1
10 TABLET ORAL EVERY 4 HOURS PRN
Status: DISCONTINUED | OUTPATIENT
Start: 2021-11-22 | End: 2021-11-22

## 2021-11-22 RX ORDER — LOPERAMIDE HYDROCHLORIDE 2 MG/1
2 CAPSULE ORAL 4 TIMES DAILY PRN
Status: DISCONTINUED | OUTPATIENT
Start: 2021-11-22 | End: 2021-11-24 | Stop reason: HOSPADM

## 2021-11-22 RX ORDER — PRAVASTATIN SODIUM 20 MG
40 TABLET ORAL DAILY
Status: DISCONTINUED | OUTPATIENT
Start: 2021-11-22 | End: 2021-11-24 | Stop reason: HOSPADM

## 2021-11-22 RX ADMIN — INSULIN LISPRO 2 UNITS: 100 INJECTION, SOLUTION INTRAVENOUS; SUBCUTANEOUS at 22:08

## 2021-11-22 RX ADMIN — IPRATROPIUM BROMIDE AND ALBUTEROL SULFATE 1 AMPULE: .5; 2.5 SOLUTION RESPIRATORY (INHALATION) at 19:46

## 2021-11-22 RX ADMIN — IPRATROPIUM BROMIDE AND ALBUTEROL SULFATE 1 AMPULE: .5; 2.5 SOLUTION RESPIRATORY (INHALATION) at 17:12

## 2021-11-22 RX ADMIN — POTASSIUM BICARBONATE 40 MEQ: 782 TABLET, EFFERVESCENT ORAL at 09:02

## 2021-11-22 RX ADMIN — PANTOPRAZOLE SODIUM 40 MG: 40 TABLET, DELAYED RELEASE ORAL at 16:48

## 2021-11-22 RX ADMIN — SODIUM CHLORIDE, PRESERVATIVE FREE 10 ML: 5 INJECTION INTRAVENOUS at 09:03

## 2021-11-22 RX ADMIN — IPRATROPIUM BROMIDE AND ALBUTEROL SULFATE 1 AMPULE: .5; 2.5 SOLUTION RESPIRATORY (INHALATION) at 12:12

## 2021-11-22 RX ADMIN — FLUCONAZOLE IN SODIUM CHLORIDE 400 MG: 2 INJECTION, SOLUTION INTRAVENOUS at 22:36

## 2021-11-22 RX ADMIN — METOPROLOL SUCCINATE 50 MG: 50 TABLET, EXTENDED RELEASE ORAL at 09:02

## 2021-11-22 RX ADMIN — SODIUM CHLORIDE, PRESERVATIVE FREE 10 ML: 5 INJECTION INTRAVENOUS at 22:08

## 2021-11-22 RX ADMIN — PIPERACILLIN AND TAZOBACTAM 3375 MG: 3; .375 INJECTION, POWDER, LYOPHILIZED, FOR SOLUTION INTRAVENOUS at 14:19

## 2021-11-22 RX ADMIN — PIPERACILLIN AND TAZOBACTAM 3375 MG: 3; .375 INJECTION, POWDER, LYOPHILIZED, FOR SOLUTION INTRAVENOUS at 22:37

## 2021-11-22 RX ADMIN — ENOXAPARIN SODIUM 40 MG: 100 INJECTION SUBCUTANEOUS at 09:02

## 2021-11-22 RX ADMIN — AMLODIPINE BESYLATE 10 MG: 10 TABLET ORAL at 09:02

## 2021-11-22 RX ADMIN — PANTOPRAZOLE SODIUM 40 MG: 40 INJECTION, POWDER, FOR SOLUTION INTRAVENOUS at 09:02

## 2021-11-22 RX ADMIN — METOPROLOL TARTRATE 10 MG: 1 INJECTION, SOLUTION INTRAVENOUS at 05:40

## 2021-11-22 RX ADMIN — IPRATROPIUM BROMIDE AND ALBUTEROL SULFATE 1 AMPULE: .5; 2.5 SOLUTION RESPIRATORY (INHALATION) at 08:17

## 2021-11-22 RX ADMIN — POTASSIUM BICARBONATE 40 MEQ: 782 TABLET, EFFERVESCENT ORAL at 22:08

## 2021-11-22 RX ADMIN — PIPERACILLIN AND TAZOBACTAM 3375 MG: 3; .375 INJECTION, POWDER, LYOPHILIZED, FOR SOLUTION INTRAVENOUS at 05:40

## 2021-11-22 RX ADMIN — CALCIUM POLYCARBOPHIL 625 MG: 625 TABLET, FILM COATED ORAL at 22:07

## 2021-11-22 RX ADMIN — SODIUM CHLORIDE, PRESERVATIVE FREE 10 ML: 5 INJECTION INTRAVENOUS at 22:19

## 2021-11-22 RX ADMIN — PRAVASTATIN SODIUM 40 MG: 20 TABLET ORAL at 09:02

## 2021-11-22 ASSESSMENT — PAIN SCALES - GENERAL
PAINLEVEL_OUTOF10: 0

## 2021-11-22 NOTE — PROGRESS NOTES
[facility]  General Surgery    Patient:  Rocco Abreu 80 y.o. male MRN: [id number]     Date of Service: 11/22/2021     CC: Abdominal pain     Days since admission: 5    Subjective      Overnight events: 15 mL output serous sanguineous fluid from BRIGIDO drain last 24 hours. Having watery bowel movements throughout the night. Patient states he is not in any pain this AM and felling much better. He feels pressure when he has to urinate. States he was up all night with watery bowel movements. Objective  Physical Exam:  · Vitals: /78   Pulse 97   Temp 97.4 °F (36.3 °C) (Oral)   Resp 18   Ht 5' 10\" (1.778 m)   Wt 251 lb 1.6 oz (113.9 kg)   SpO2 97%   BMI 36.03 kg/m²     I & O - 24hr:   Intake/Output Summary (Last 24 hours) at 11/22/2021 0729  Last data filed at 11/22/2021 0468  Gross per 24 hour   Intake 3020 ml   Output 1655 ml   Net 1365 ml   ·    · General Appearance: alert, cooperative and no distress  · HEENT:  Head: Normocephalic, no lesions, without obvious abnormality. · Neck: no adenopathy and supple, symmetrical, trachea midline  · Abdomen: soft, non-tender; bowel sounds normal; no masses,  no organomegaly, positive for watery BM, negative for blood in BM  · Extremities:  extremities normal, atraumatic, no cyanosis or edema  · Musculokeletal: No joint swelling, no muscle tenderness. ROM normal in all joints of extremities.    · Neurologic: Mental status: Alert, oriented, thought content appropriate  Subject  Imaging and labs   CBC with Differential:    Lab Results   Component Value Date    WBC 11.6 11/22/2021    RBC 4.00 11/22/2021    HGB 9.4 11/22/2021    HCT 30.4 11/22/2021     11/22/2021    MCV 76.0 11/22/2021    MCH 23.5 11/22/2021    MCHC 30.9 11/22/2021    RDW 15.0 11/22/2021    METASPCT 0.9 11/18/2021    LYMPHOPCT 7.6 11/22/2021    MONOPCT 11.7 11/22/2021    BASOPCT 0.2 11/22/2021    MONOSABS 1.35 11/22/2021    LYMPHSABS 0.88 11/22/2021    EOSABS 0.17 11/22/2021    BASOSABS 0.02 11/22/2021     BMP:    Lab Results   Component Value Date     11/22/2021    K 3.2 11/22/2021     11/22/2021    CO2 21 11/22/2021    BUN 15 11/22/2021    LABALBU 2.8 11/22/2021    CREATININE 0.9 11/22/2021    CALCIUM 8.1 11/22/2021    GFRAA >60 11/22/2021    LABGLOM >60 11/22/2021    GLUCOSE 288 11/22/2021       IMAGING:   Imaging Studies:    CT ABDOMEN PELVIS WO CONTRAST Additional Contrast? Oral    Addendum Date: 11/18/2021    ADDENDUM: I verbally informed Dr. Denny Mccrary of critical findings on 11/18/2021 at 4 a.m. Rocco Campuzano Result Date: 11/18/2021  Since the previous study, there is increased free intraperitoneal air and there is new extravasated fluid and gastrointestinal contrast.  This appears to be emanating from the duodenal bulb region. Findings are compatible with perforated duodenal ulcer. There is a new small right pleural effusion. CT ABDOMEN PELVIS W IV CONTRAST Additional Contrast? None    Result Date: 11/17/2021  Thickening of the duodenal bulb with evidence of free air in the anterior upper abdomen. Findings consistent with duodenitis/peptic ulcer disease with perforation. Findings discussed on 11/17/2021 with Dr. Mark Emerson at 5:38 p.m. . 10.0 cm cyst right upper lobe. Mild prostatomegaly. XR ABDOMEN FOR NG/OG/NE TUBE PLACEMENT    Result Date: 11/18/2021  Enteric tube in the stomach     FL UGI    Result Date: 11/21/2021  1. Walled off well contained perforated antropyloric ulcer projecting superiorly along the lesser curvature of the most distal aspect of the stomach, just proximal to the base of the duodenum. 2.  No extravasation of contrast from the walled off perforated antropyloric ulcer. 3.  No free extraluminal contrast seen in the right upper quadrant. 4.  No gastric outlet obstruction.               Notable Cultures:    Urine   Urine Culture, Routine   Date Value Ref Range Status   11/17/2021 Growth not present  Final          Antibiotic Day started Diflucan 400mg IV  11/18    Zosyn  11/18             OXYGENATION:  4L NC    DIET: Regular, 3 carb, low fiber        Assessment and Plan     Assessment:  1. POD 4 lap modified Grahm patch for perforated duodenal ulcer   2. WBC count 14.4 yesterday   3.  Diabetes mellitus       Plan:   Advance diet- regular 3 carb, low fiber    Change PPI to oral   Monitor WBC and finish antibiotics    Remove mckinney catheter    Evaluate O2 stats with out nasal canula oxygen   Plan to remove BRIGIDO drain before discharge    Change to ACHS blood sugars, continue Humalog          Rojas Vazquez, MS3  Attending physician: Dr. Anel Jean

## 2021-11-22 NOTE — CARE COORDINATION
Care Coordination:  Following for discharge planning . Per surgery today, plan is to wean o2, advance diet, finish antibiotics and remove BRIGIDO drain prior to discharge. Met with patient who confirms his plan is to return home. He asked about assist for housekeeping. Screened for Directions Home  referral but income is too high. Provided private duty agency list to patient   Denied need for [address]. Family will transpor at d/c .  SW to continue to follow    [phone]: Therapy recommends tub transfer bench and states patient wants arrangement for it to be delivered to room if possible. Met with patient and he is agreeable to referral to [facility] but tells this worker he is not certain he wants this DME. He is aware this is a private pay device. Order obtained and referral called to Sergio at [facility]. She will meet with patient tomorrow. Also discussed recommendation for [address] therapy with patient. Attempted to choice patient for referral.  He states he wants to think about it and asks SW to follow with him tomorrow. The Plan for Transition of Care is related to the following treatment goals:DME and HHC    The Patient was provided with a choice of provider and agrees   with the discharge plan. [x] Yes [] No    Freedom of choice list was provided with basic dialogue that supports the patient's individualized plan of care/goals, treatment preferences and shares the quality data associated with the providers.  [x] Yes [] No

## 2021-11-22 NOTE — PROGRESS NOTES
[address]        GXRL:                                                  Patient Name: Allan Toussaint    MRN: [id number]    : 1940    Room: [address] [place] [address]      Evaluating OT: Tuyet Arceo, [address] OTR/L; [id number]      Referring Provider: Reginald Palma DO    Specific Provider Orders/Date: OT Eval and Treat 21      Diagnosis: Abdominal Pain  Acute duodenal ulcer with perforation     Surgery:  LAPAROSCOPIC DREAD PATCH, PERFORATED POST PYLORIC ULCER  21  EGD DIAGNOSTIC ONLY  21    Pertinent Medical History:  has a past medical history of Diabetes mellitus ([facility] [address]      Recommended Adaptive Equipment: TBD pending progression - ww, tub bench   Precautions:  Fall Risk     Assessment of current deficits    [x] Functional mobility  [x]ADLs  [x] Strength               []Cognition    [x] Functional transfers   [x] IADLs         [x] Safety Awareness   [x]Endurance    [] Fine Coordination              [x] Balance      [] Vision/perception   []Sensation     []Gross Motor Coordination  [] ROM  [] Delirium                   [] Motor Control     OT PLAN OF CARE   OT POC based on physician orders, patient diagnosis and results of clinical assessment    Frequency/Duration: 2-3 days/wk for 2 weeks PRN   Specific OT Treatment Interventions to include:   * Instruction/training on adapted ADL techniques and AE recommendations to increase functional independence within precautions       * Training on energy conservation strategies, correct breathing pattern and techniques to improve independence/tolerance for self-care routine  * Functional transfer/mobility training/DME recommendations for increased independence, safety, and fall prevention  * Patient/Family education to increase follow through with safety techniques and functional independence  * Recommendation of environmental modifications for increased safety with functional transfers/mobility and ADLs  * Therapeutic exercise to improve motor endurance, ROM, and functional strength for ADLs/functional transfers  * Therapeutic activities to facilitate/challenge dynamic balance, stand tolerance for increased safety and independence with ADLs      Home Living: Pt lives alone in ground level apartment with 0 steps to enter. Laundry located on separate floor with 18 stairs and BHR to access   Bathroom setup: tub/shower unit   Equipment owned: rollator, cane    Prior Level of Function: independent with ADLs, independent with IADLs  ambulated with no AD prior  Driving: yes    Pain Level: 0/10  Cognition: A&O: 4/4; Follows multi step directions   Memory:  good   Sequencing:  fair   Problem solving:  fair   Judgement/safety:  fair     Functional Assessment:  AM-PAC Daily Activity Raw Score: 19/24   Initial Eval Status  Date: 11/22/21 Treatment Status  Date: STGs = LTGs  Time frame: 10-14 days   Feeding Independent      Grooming Stand by Assist   Hand hygiene standing sink side  Educated on task modification and body mechanics during dynamic standing task   Independent    UB Dressing Stand by Assist   Simulated functional reach seated EOB   Independent    LB Dressing Stand by Assist   Albert/doff socks seated EOB   Educated on task modification   Independent    Bathing Minimal Assist  Educated on use of tub bench for safety with transfer  Independent    Toileting Stand by Assist   Educated on use of grab bars for lower surface transfer   Independent    Bed Mobility  Log Roll: NT  Supine to sit: NT   Sit to supine: NT  Pt seated EOB   Supine to sit: Independent   Sit to supine: Independent    Functional Transfers Sit to stand: SBA    Stand to sit: SBA  Stand pivot: SBA  Commode: SBA  Sit to stand: Ind   Stand to sit: Ind  Stand pivot:  Ind  Commode: Ind    Functional Mobility Min A with HHA to bathroom with no AD  Pt reaching for furniture and walls during EOB to bathroom functional mobility   SBA with ww to EOB  Pt educated on increased safety and independence with use of ww    Moderate [place] with ww   Balance Sitting:     Static - IND     Dynamic - IND  Standing: SBA  Standing: IND   Activity Tolerance Fair   Limited d/t fatigue/overall weakness  Good   Visual/  Perceptual Glasses: yes - reading only    pt able to read clock on wall          Vitals SpO2 on RA: 98% during session        Hand Dominance: Right   AROM (PROM) Strength Additional Info:  Goal:   RUE  WFL 4/5 good  and wfl FMC/dexterity noted during ADL tasks   Improve overall RUE strength to 4+/5 for participation in functional tasks       LUE WFL 4/5 Good  and wfl FMC/dexterity noted during ADL tasks   Improve overall LUE strength to 4+/5 for participation in functional tasks         Hearing: [facility]   Sensation:  No c/o numbness or tingling  Tone: WFL   Edema: unremarkable    Comment: Cleared by RN to see pt. Upon arrival patient seated EOB and agreeable to OT session. At end of session, patient seated EOB with call light and phone within reach, all lines and tubes intact. Overall patient demonstrated decreased independence and safety during completion of ADL/functional transfer/mobility tasks. Pt would benefit from continued skilled OT to increase safety and independence with completion of ADL/IADL tasks for functional independence and quality of life.     Treatment: OT treatment provided this date includes:    ADL-  Instruction/training on safety and adapted techniques for completion of ADLs - jairo/doff socks seated EOB educated on task modification, educated on use of grab bar for lower surface transfer noted LOB standing sink side educated on use of ww for increased safety and independence with functional tasks    Mobility-  Instruction/training on safety and improved independence with bed mobility/functional transfers and functional mobility - educated on ww management and proper body mechanics and posture for increased safety and decrease safety risk of reaching for furniture/wall     Sitting EOB x 10 minutes to improve dynamic sitting balance and activity tolerance during ADLs.   Activity tolerance- Instruction/training on energy conservation/work simplification for completion of ADLs - limited d/t fatigue/overall weakness       Rehab Potential: Good  for established goals     LTG: maximize independence with ADLs to return to PLOF    Patient and/or family were instructed on functional diagnosis, prognosis/goals and OT plan of care. Demonstrated fair understanding. [] Malnutrition indicators have been identified and nursing has been notified to ensure a dietitian consult is ordered. ·  Eval Complexity: Low    Evaluation time includes thorough review of current medical information, gathering information on past medical & social history & PLOF, completion of standardized testing, informal observation of tasks, consultation with other medical professions/disciplines, assessment of data & development of POC/goals. Time In: 1415  Time Out: 1430  Total Treatment Time: none    Min Units   OT Eval Low 00109  x     OT Eval Medium 21407      OT Eval High 46417      OT Re-Eval [id number]       Therapeutic Ex [id number]       Therapeutic Activities [id number]       ADL/Self Care 53794       Orthotic Management 25701       Manual [id number]     Neuro Re-Ed [id number]       Non-Billable Time          Evaluation Time additionally includes thorough review of current medical information, gathering information on past medical history/social history and prior level of function, interpretation of standardized testing/informal observation of tasks, assessment of data and development of plan of care and goals.          PRAVEEN Ying OTR/L; [id number]

## 2021-11-22 NOTE — PLAN OF CARE
Problem: Pain:  Goal: Pain level will decrease  Description: Pain level will decrease  11/21/2021 2255 by Casey Maher RN  Outcome: Met This Shift  11/21/2021 1844 by Flavia Franklin RN  Outcome: Met This Shift  Goal: Control of acute pain  Description: Control of acute pain  11/21/2021 2255 by Casey Maher RN  Outcome: Met This Shift  11/21/2021 1844 by Flavia Franklin RN  Outcome: Met This Shift  Goal: Control of chronic pain  Description: Control of chronic pain  11/21/2021 2255 by Casey Maher RN  Outcome: Met This Shift  11/21/2021 1844 by Flavia Franklin RN  Outcome: Met This Shift     Problem: Confusion - Acute:  Goal: Absence of continued neurological deterioration signs and symptoms  Description: Absence of continued neurological deterioration signs and symptoms  11/21/2021 2255 by Casey Maher RN  Outcome: Met This Shift  11/21/2021 1844 by Flavia Franklin RN  Outcome: Met This Shift  Goal: Mental status will be restored to baseline  Description: Mental status will be restored to baseline  11/21/2021 1844 by Flavia Franklin RN  Outcome: Met This Shift     Problem: Discharge Planning:  Goal: Ability to perform activities of daily living will improve  Description: Ability to perform activities of daily living will improve  11/21/2021 1844 by Flavia Franklin RN  Outcome: Met This Shift  Goal: Participates in care planning  Description: Participates in care planning  11/21/2021 1844 by Flavia Franklin RN  Outcome: Met This Shift     Problem: Injury - Risk of, Physical Injury:  Goal: Absence of physical injury  Description: Absence of physical injury  11/21/2021 1844 by Flavia Franklin RN  Outcome: Met This Shift  Goal: Will remain free from falls  Description: Will remain free from falls  11/21/2021 1844 by Flavia Franklin RN  Outcome: Met This Shift     Problem: Psychomotor Activity - Altered:  Goal: Absence of psychomotor disturbance signs and symptoms  Description: Absence of psychomotor disturbance signs and symptoms  11/21/2021 1844 by Flavia Franklin RN  Outcome: Met This Shift     Problem: Sensory Perception - Impaired:  Goal: Demonstrations of improved sensory functioning will increase  Description: Demonstrations of improved sensory functioning will increase  11/21/2021 1844 by Flavia Franklin RN  Outcome: Met This Shift  Goal: Decrease in sensory misperception frequency  Description: Decrease in sensory misperception frequency  11/21/2021 1844 by Flavia Franklin RN  Outcome: Met This Shift  Goal: Able to refrain from responding to false sensory perceptions  Description: Able to refrain from responding to false sensory perceptions  11/21/2021 1844 by Flavia Franklin RN  Outcome: Met This Shift  Goal: Demonstrates accurate environmental perceptions  Description: Demonstrates accurate environmental perceptions  11/21/2021 1844 by Flavia Franklin RN  Outcome: Met This Shift  Goal: Able to distinguish between reality-based and nonreality-based thinking  Description: Able to distinguish between reality-based and nonreality-based thinking  11/21/2021 1844 by Flavia Franklin RN  Outcome: Met This Shift  Goal: Able to interrupt nonreality-based thinking  Description: Able to interrupt nonreality-based thinking  11/21/2021 1844 by Flavia Franklin RN  Outcome: Met This Shift     Problem: Sleep Pattern Disturbance:  Goal: Appears well-rested  Description: Appears well-rested  11/21/2021 1844 by Flavia Franklin RN  Outcome: Met This Shift     Problem: Activity:  Goal: Risk for activity intolerance will decrease  Description: Risk for activity intolerance will decrease  11/21/2021 1844 by Flavia Franklin RN  Outcome: Met This Shift     Problem:  Bowel/Gastric:  Goal: Bowel function will improve  Description: Bowel function will improve  11/21/2021 1844 by Flavia Franklin RN  Outcome: Met This Shift  Goal: Diagnostic test results will improve  Description: Diagnostic test results will improve  11/21/2021 1844 by Chloe Crockett RN  Outcome: Met This Shift  Goal: Occurrences of nausea will decrease  Description: Occurrences of nausea will decrease  11/21/2021 1844 by Chloe Crockett RN  Outcome: Met This Shift  Goal: Occurrences of vomiting will decrease  Description: Occurrences of vomiting will decrease  11/21/2021 1844 by Chloe Crockett RN  Outcome: Met This Shift     Problem: Fluid Volume:  Goal: Maintenance of adequate hydration will improve  Description: Maintenance of adequate hydration will improve  11/21/2021 1844 by Chloe Crockett RN  Outcome: Met This Shift     Problem: Health Behavior:  Goal: Ability to state signs and symptoms to report to health care provider will improve  Description: Ability to state signs and symptoms to report to health care provider will improve  11/21/2021 1844 by Chloe Crockett RN  Outcome: Met This Shift     Problem: Physical Regulation:  Goal: Complications related to the disease process, condition or treatment will be avoided or minimized  Description: Complications related to the disease process, condition or treatment will be avoided or minimized  11/21/2021 1844 by Chloe Crockett RN  Outcome: Met This Shift  Goal: Ability to maintain clinical measurements within normal limits will improve  Description: Ability to maintain clinical measurements within normal limits will improve  11/21/2021 1844 by Chloe Crockett RN  Outcome: Met This Shift     Problem: Sensory:  Goal: Pain level will decrease  Description: Pain level will decrease  11/21/2021 2255 by Harshal Palacio RN  Outcome: Met This Shift  11/21/2021 1844 by Chloe Crockett RN  Outcome: Met This Shift  Goal: Ability to identify factors that increase the pain will improve  Description: Ability to identify factors that increase the pain will improve  11/21/2021 1844 by Chloe Crockett RN  Outcome: Met This Shift  Goal: Ability to notify healthcare provider of pain before it becomes unmanageable or unbearable will improve  Description: Ability to notify healthcare provider of pain before it becomes unmanageable or unbearable will improve  11/21/2021 1844 by Karine Negro RN  Outcome: Met This Shift     Problem: Falls - Risk of:  Goal: Absence of physical injury  Description: Absence of physical injury  11/21/2021 1844 by Karine Negro RN  Outcome: Met This Shift  Goal: Will remain free from falls  Description: Will remain free from falls  11/21/2021 1844 by Karine Negro RN  Outcome: Met This Shift     Problem: Skin Integrity:  Goal: Will show no infection signs and symptoms  Description: Will show no infection signs and symptoms  11/21/2021 1844 by Karine Negro RN  Outcome: Met This Shift  Goal: Absence of new skin breakdown  Description: Absence of new skin breakdown  11/21/2021 1844 by Karine Negro RN  Outcome: Met This Shift

## 2021-11-23 LAB
ALBUMIN SERPL-MCNC: 2.9 G/DL (ref 3.5–5.2)
ALP BLD-CCNC: 29 U/L (ref 40–129)
ALT SERPL-CCNC: 29 U/L (ref 0–40)
ANAEROBIC CULTURE: NORMAL
ANION GAP SERPL CALCULATED.3IONS-SCNC: 12 MMOL/L (ref 7–16)
AST SERPL-CCNC: 32 U/L (ref 0–39)
BASOPHILS ABSOLUTE: 0.01 E9/L (ref 0–0.2)
BASOPHILS RELATIVE PERCENT: 0.1 % (ref 0–2)
BILIRUB SERPL-MCNC: 0.6 MG/DL (ref 0–1.2)
BUN BLDV-MCNC: 9 MG/DL (ref 6–23)
CALCIUM SERPL-MCNC: 7.8 MG/DL (ref 8.6–10.2)
CHLORIDE BLD-SCNC: 105 MMOL/L (ref 98–107)
CO2: 25 MMOL/L (ref 22–29)
CREAT SERPL-MCNC: 1 MG/DL (ref 0.7–1.2)
EOSINOPHILS ABSOLUTE: 0.14 E9/L (ref 0.05–0.5)
EOSINOPHILS RELATIVE PERCENT: 1.8 % (ref 0–6)
GFR AFRICAN AMERICAN: >60
GFR NON-AFRICAN AMERICAN: >60 ML/MIN/1.73
GLUCOSE BLD-MCNC: 97 MG/DL (ref 74–99)
HCT VFR BLD CALC: 29.5 % (ref 37–54)
HEMOGLOBIN: 9.1 G/DL (ref 12.5–16.5)
IMMATURE GRANULOCYTES #: 0.28 E9/L
IMMATURE GRANULOCYTES %: 3.6 % (ref 0–5)
LYMPHOCYTES ABSOLUTE: 0.85 E9/L (ref 1.5–4)
LYMPHOCYTES RELATIVE PERCENT: 10.9 % (ref 20–42)
MCH RBC QN AUTO: 22.5 PG (ref 26–35)
MCHC RBC AUTO-ENTMCNC: 30.8 % (ref 32–34.5)
MCV RBC AUTO: 72.8 FL (ref 80–99.9)
METER GLUCOSE: 112 MG/DL (ref 74–99)
METER GLUCOSE: 122 MG/DL (ref 74–99)
METER GLUCOSE: 143 MG/DL (ref 74–99)
METER GLUCOSE: 99 MG/DL (ref 74–99)
MONOCYTES ABSOLUTE: 1.04 E9/L (ref 0.1–0.95)
MONOCYTES RELATIVE PERCENT: 13.4 % (ref 2–12)
NEUTROPHILS ABSOLUTE: 5.45 E9/L (ref 1.8–7.3)
NEUTROPHILS RELATIVE PERCENT: 70.2 % (ref 43–80)
PDW BLD-RTO: 15.1 FL (ref 11.5–15)
PLATELET # BLD: 199 E9/L (ref 130–450)
PMV BLD AUTO: 10.3 FL (ref 7–12)
POTASSIUM REFLEX MAGNESIUM: 3.9 MMOL/L (ref 3.5–5)
RBC # BLD: 4.05 E12/L (ref 3.8–5.8)
SODIUM BLD-SCNC: 142 MMOL/L (ref 132–146)
TOTAL PROTEIN: 5.2 G/DL (ref 6.4–8.3)
WBC # BLD: 7.8 E9/L (ref 4.5–11.5)

## 2021-11-23 PROCEDURE — 80053 COMPREHEN METABOLIC PANEL: CPT

## 2021-11-23 PROCEDURE — 6360000002 HC RX W HCPCS: Performed by: STUDENT IN AN ORGANIZED HEALTH CARE EDUCATION/TRAINING PROGRAM

## 2021-11-23 PROCEDURE — 82962 GLUCOSE BLOOD TEST: CPT

## 2021-11-23 PROCEDURE — 36415 COLL VENOUS BLD VENIPUNCTURE: CPT

## 2021-11-23 PROCEDURE — 94640 AIRWAY INHALATION TREATMENT: CPT

## 2021-11-23 PROCEDURE — 2580000003 HC RX 258: Performed by: STUDENT IN AN ORGANIZED HEALTH CARE EDUCATION/TRAINING PROGRAM

## 2021-11-23 PROCEDURE — 85025 COMPLETE CBC W/AUTO DIFF WBC: CPT

## 2021-11-23 PROCEDURE — 6370000000 HC RX 637 (ALT 250 FOR IP): Performed by: STUDENT IN AN ORGANIZED HEALTH CARE EDUCATION/TRAINING PROGRAM

## 2021-11-23 PROCEDURE — 1200000000 HC SEMI PRIVATE

## 2021-11-23 PROCEDURE — 97161 PT EVAL LOW COMPLEX 20 MIN: CPT

## 2021-11-23 PROCEDURE — 99024 POSTOP FOLLOW-UP VISIT: CPT | Performed by: SURGERY

## 2021-11-23 PROCEDURE — 6370000000 HC RX 637 (ALT 250 FOR IP): Performed by: SURGERY

## 2021-11-23 PROCEDURE — 6370000000 HC RX 637 (ALT 250 FOR IP)

## 2021-11-23 RX ORDER — SUCRALFATE 1 G/1
1 TABLET ORAL 4 TIMES DAILY
Qty: 120 TABLET | Refills: 0 | Status: SHIPPED | OUTPATIENT
Start: 2021-11-23 | End: 2022-02-01

## 2021-11-23 RX ORDER — PANTOPRAZOLE SODIUM 40 MG/1
40 TABLET, DELAYED RELEASE ORAL
Qty: 60 TABLET | Refills: 0 | Status: SHIPPED | OUTPATIENT
Start: 2021-11-23 | End: 2021-12-08 | Stop reason: SDUPTHER

## 2021-11-23 RX ORDER — SUCRALFATE 1 G/1
1 TABLET ORAL EVERY 6 HOURS SCHEDULED
Status: DISCONTINUED | OUTPATIENT
Start: 2021-11-23 | End: 2021-11-24 | Stop reason: HOSPADM

## 2021-11-23 RX ADMIN — METOPROLOL SUCCINATE 50 MG: 50 TABLET, EXTENDED RELEASE ORAL at 09:11

## 2021-11-23 RX ADMIN — PIPERACILLIN AND TAZOBACTAM 3375 MG: 3; .375 INJECTION, POWDER, LYOPHILIZED, FOR SOLUTION INTRAVENOUS at 06:21

## 2021-11-23 RX ADMIN — ENOXAPARIN SODIUM 40 MG: 100 INJECTION SUBCUTANEOUS at 09:11

## 2021-11-23 RX ADMIN — INSULIN LISPRO 2 UNITS: 100 INJECTION, SOLUTION INTRAVENOUS; SUBCUTANEOUS at 21:18

## 2021-11-23 RX ADMIN — PRAVASTATIN SODIUM 40 MG: 20 TABLET ORAL at 09:12

## 2021-11-23 RX ADMIN — PANTOPRAZOLE SODIUM 40 MG: 40 TABLET, DELAYED RELEASE ORAL at 17:09

## 2021-11-23 RX ADMIN — IPRATROPIUM BROMIDE AND ALBUTEROL SULFATE 1 AMPULE: .5; 2.5 SOLUTION RESPIRATORY (INHALATION) at 20:56

## 2021-11-23 RX ADMIN — CALCIUM POLYCARBOPHIL 625 MG: 625 TABLET, FILM COATED ORAL at 21:18

## 2021-11-23 RX ADMIN — SODIUM CHLORIDE, PRESERVATIVE FREE 10 ML: 5 INJECTION INTRAVENOUS at 09:12

## 2021-11-23 RX ADMIN — SUCRALFATE 1 G: 1 TABLET ORAL at 17:09

## 2021-11-23 RX ADMIN — SUCRALFATE 1 G: 1 TABLET ORAL at 11:16

## 2021-11-23 RX ADMIN — PANTOPRAZOLE SODIUM 40 MG: 40 TABLET, DELAYED RELEASE ORAL at 06:21

## 2021-11-23 RX ADMIN — CALCIUM POLYCARBOPHIL 625 MG: 625 TABLET, FILM COATED ORAL at 09:13

## 2021-11-23 RX ADMIN — IPRATROPIUM BROMIDE AND ALBUTEROL SULFATE 1 AMPULE: .5; 2.5 SOLUTION RESPIRATORY (INHALATION) at 16:48

## 2021-11-23 RX ADMIN — IPRATROPIUM BROMIDE AND ALBUTEROL SULFATE 1 AMPULE: .5; 2.5 SOLUTION RESPIRATORY (INHALATION) at 08:23

## 2021-11-23 RX ADMIN — AMLODIPINE BESYLATE 10 MG: 10 TABLET ORAL at 09:12

## 2021-11-23 RX ADMIN — IPRATROPIUM BROMIDE AND ALBUTEROL SULFATE 1 AMPULE: .5; 2.5 SOLUTION RESPIRATORY (INHALATION) at 12:01

## 2021-11-23 RX ADMIN — SODIUM CHLORIDE, PRESERVATIVE FREE 10 ML: 5 INJECTION INTRAVENOUS at 21:19

## 2021-11-23 ASSESSMENT — PAIN SCALES - GENERAL
PAINLEVEL_OUTOF10: 0
PAINLEVEL_OUTOF10: 0

## 2021-11-23 NOTE — PROGRESS NOTES
Physical Therapy  Physical Therapy Initial Assessment     Name: Wolf Akhtar  : 1940  MRN: [id number]      Date of Service: 2021    Evaluating PT:  Lani Luis PT, DPT    Room #:  0435/7477-T  Diagnosis:  Abdominal pain [R10.9]  Duodenal ulcer perforation ([facility] [address] [K26.5]  PMHx/PSHx:  DM, B TKA  Procedure/Surgery:  S/p laparoscopy with Bon Wier Ronak patch   Precautions:  Abdominal, fall risk  Equipment Needs:  TBD    SUBJECTIVE:    Pt lives alone in a 1st floor apt with no steps to enter. Bed/bath is on 1st floor. Pt ambulated with no AD PTA. OBJECTIVE:   Initial Evaluation  Date: 21 Treatment Short Term/ Long Term   Goals   AM-PAC 6 Clicks      Was pt agreeable to Eval/treatment? yes     Does pt have pain? No pain     Bed Mobility  Rolling: NT  Supine to sit: NT  Sit to supine: NT  Scooting: NT  Rolling: IND  Supine to sit: IND  Sit to supine: IND  Scooting: IND   Transfers Sit to stand: SBA  Stand to sit: SBA  Stand pivot: SBA  Sit to stand: mod I  Stand to sit: mod I  Stand pivot: mod I   Ambulation    200' with ww SBA  300'+ with AAD mod I   Stair negotiation: ascended and descended  NT     ROM BUE:  WFL  BLE:  WFL     Strength BUE:  WFL  BLE:  WFL     Balance Sitting EOB:  IND  Dynamic Standing:  SBA with ww    Dynamic Standing: Mod I with AAD     Pt is A & O x 4  Sensation:  Pt denies numbness and tingling to extremities  Edema:  unremarkable    Therapeutic Exercises:    Transfers: STS x2, cued for hand placement   Ambulation: 200' with ww    Patient education  Pt educated on role of PT and importance of functional mobility during hospital stay.     Patient response to education:   Pt verbalized understanding Pt demonstrated skill Pt requires further education in this area   yes yes reinforce     ASSESSMENT:    Conditions Requiring Skilled Therapeutic Intervention:    [x]Decreased strength     []Decreased ROM  [x]Decreased functional mobility  []Decreased balance   [x]Decreased endurance   []Decreased posture  []Decreased sensation  []Decreased coordination   []Decreased vision  []Decreased safety awareness   []Increased pain       Comments:      Pt sitting EOB upon entering, agreeable to participate. Pt instructed to ambulate to tolerance, provided ww, as pt preferred to have AD for ambulating longer distances. Pt demonstrating flexed trunk and decreased step length. Pt cued for correction of posture and ww spacing. Pt tolerating ambulation bout well with no c/o SOB or fatigue. Pt returned to bedside, remained seated at EOB, call bell in reach and all needs met prior to exiting. Treatment:  Patient practiced and was instructed in the following treatment:     STS and pivot transfer training - pt educated on proper hand and foot placement, safety and sequencing, and use of verbal and tactile cues to safely complete sit<>stand and pivot transfers with hands on assistance to complete task safely    Gait training- pt was given verbal and tactile cues to facilitate postural correction and proper ww spacing during ambulation as well as provided with physical assistance. Pt's/ family goals   1. Return home    Prognosis is good for reaching above PT goals. Patient and or family understand(s) diagnosis, prognosis, and plan of care.   yes    PHYSICAL THERAPY PLAN OF CARE:    PT POC is established based on physician order and patient diagnosis     Referring provider/PT Order:  Corrie Fong MD  Diagnosis:  Abdominal pain [R10.9]  Duodenal ulcer perforation ([facility] [address] [K26.5]  Specific instructions for next treatment:  Progress as tolerated, gait training    Current Treatment Recommendations:     [x] Strengthening to improve independence with functional mobility   [] ROM to improve independence with functional mobility   [x] Balance Training to improve static/dynamic balance and to reduce fall risk  [x] Endurance Training to improve activity tolerance during functional mobility   [x] Transfer Training to improve safety and independence with all functional transfers   [x] Gait Training to improve gait mechanics, endurance and assess need for appropriate assistive device  [] Stair Training in preparation for safe discharge home and/or into the community   [] Positioning to prevent skin breakdown and contractures  [x] Safety and Education Training   [x] Patient/Caregiver Education   [] HEP  [] Other     PT long term treatment goals are located in above grid    Frequency of treatments: 2-5x/week x 1-2 weeks. Time in  1505  Time out  1520    Total Treatment Time  5 minutes     Evaluation Time includes thorough review of current medical information, gathering information on past medical history/social history and prior level of function, completion of standardized testing/informal observation of tasks, assessment of data and education on plan of care and goals.     CPT codes:  [x] Low Complexity PT evaluation 98564  [] Moderate Complexity PT evaluation 01865  [] High Complexity PT evaluation 71982  [] PT Re-evaluation 64045  [x] Gait training 71580 5 minutes  [] Manual therapy [id number] -- minutes  [] Therapeutic activities 66208 -- minutes  [] Therapeutic exercises 50767 -- minutes  [] Neuromuscular reeducation 06381 -- minutes     Joseph Araujo, PT, DPT  [id number]

## 2021-11-23 NOTE — PROGRESS NOTES
[facility]  General Surgery     Patient:  Deonte Tam 80 y.o. male MRN: [id number]     Date of Service: 11/22/2021            CC: Abdominal pain      Days since admission: 6     Subjective       Overnight events: 5 mL output serous sanguineous fluid from BRIGIDO drain last 12 hours. 2 BM overnight, more solid than previous night, no blood.      Patient states he is not in any pain this AM. Not having any problem urinating with out mckinney in. Having less bowel movements. Tolerating solid foods. Able to ambulate to the bathroom, no longer using bedside commode.        Objective  Physical Exam:  · Vitals: /88   Pulse 96   Temp 98.9 °F (37.2 °C) (Oral)   Resp 18   Ht 5' 10\" (1.778 m)   Wt 251 lb 1.6 oz (113.9 kg)   SpO2 95%   BMI 36.03 kg/m²     I & O - 24hr:   Intake/Output Summary (Last 24 hours) at 11/23/2021 0831  Last data filed at 11/23/2021 0280  Gross per 24 hour   Intake 2480 ml   Output 480 ml   Net 2000 ml   ·    · General Appearance: alert, cooperative and no distress  · HEENT:  Head: Normocephalic, no lesions, without obvious abnormality. · Neck: no adenopathy and supple, symmetrical, trachea midline  · Lung: clear to auscultation bilaterally  · Heart: regular rate and rhythm, S1, S2 normal, no murmur, click, rub or gallop  · Abdomen: soft, non-tender; bowel sounds normal; no masses,  no organomegaly  · Extremities:  extremities normal, atraumatic, no cyanosis or edema  · Musculokeletal: No joint swelling, no muscle tenderness. ROM normal in all joints of extremities.    · Neurologic: Mental status: Alert, oriented, thought content appropriate  Subject    CBC:   Lab Results   Component Value Date    WBC 7.8 11/23/2021    RBC 4.05 11/23/2021    HGB 9.1 11/23/2021    HCT 29.5 11/23/2021    MCV 72.8 11/23/2021    MCH 22.5 11/23/2021    MCHC 30.8 11/23/2021    RDW 15.1 11/23/2021     11/23/2021    MPV 10.3 11/23/2021     BMP:    Lab Results   Component Value Date     11/23/2021    K 3.9 11/23/2021     11/23/2021    CO2 25 11/23/2021    BUN 9 11/23/2021    LABALBU 2.9 11/23/2021    CREATININE 1.0 11/23/2021    CALCIUM 7.8 11/23/2021    GFRAA >60 11/23/2021    LABGLOM >60 11/23/2021    GLUCOSE 97 11/23/2021       IMAGING:   Imaging Studies:    CT ABDOMEN PELVIS WO CONTRAST Additional Contrast? Oral    Addendum Date: 11/18/2021    ADDENDUM: I verbally informed Dr. Ericka Rodriguez of critical findings on 11/18/2021 at 4 a.m. Casey Gutter Result Date: 11/18/2021  Since the previous study, there is increased free intraperitoneal air and there is new extravasated fluid and gastrointestinal contrast.  This appears to be emanating from the duodenal bulb region. Findings are compatible with perforated duodenal ulcer. There is a new small right pleural effusion. CT ABDOMEN PELVIS W IV CONTRAST Additional Contrast? None    Result Date: 11/17/2021  Thickening of the duodenal bulb with evidence of free air in the anterior upper abdomen. Findings consistent with duodenitis/peptic ulcer disease with perforation. Findings discussed on 11/17/2021 with Dr. Joyce Colvin at 5:38 p.m. . 10.0 cm cyst right upper lobe. Mild prostatomegaly. XR ABDOMEN FOR NG/OG/NE TUBE PLACEMENT    Result Date: 11/18/2021  Enteric tube in the stomach     FL UGI    Result Date: 11/21/2021  1. Walled off well contained perforated antropyloric ulcer projecting superiorly along the lesser curvature of the most distal aspect of the stomach, just proximal to the base of the duodenum. 2.  No extravasation of contrast from the walled off perforated antropyloric ulcer. 3.  No free extraluminal contrast seen in the right upper quadrant. 4.  No gastric outlet obstruction.             Notable Cultures:      Gram Stain Result   Date Value Ref Range Status   11/18/2021 Refer to ordered Gram stain for results  Final     Urine   Urine Culture, Routine   Date Value Ref Range Status   11/17/2021 Growth not present  Final        Antibiotic Day  Day started   Diflucan  D/C'd 11/18   Zosyn  D/C'd   11/18                      DIET: Regular 3 carb         Assessment and Plan      Assessment:  1. POD 5 lap modified Grahm patch for perforated duodenal ulcer   2.  Diabetes mellitus         Plan:  · Monitor WBC: 7.8 today   · Antibiotics d/c'd    · Breathing room air at 95%   · BRIGIDO drain output 5cc last night Plan to remove BRIGIDO drain before discharge    · PT to see him today   · Continue Marcelalog       Davidson Lambert, MS3  Attending physician: Dr. Alec Hill

## 2021-11-23 NOTE — PLAN OF CARE
Problem: Pain:  Goal: Pain level will decrease  Description: Pain level will decrease  Outcome: Met This Shift  Goal: Control of acute pain  Description: Control of acute pain  Outcome: Met This Shift  Goal: Control of chronic pain  Description: Control of chronic pain  Outcome: Met This Shift     Problem: Confusion - Acute:  Goal: Absence of continued neurological deterioration signs and symptoms  Description: Absence of continued neurological deterioration signs and symptoms  Outcome: Met This Shift  Goal: Mental status will be restored to baseline  Description: Mental status will be restored to baseline  Outcome: Met This Shift     Problem: Discharge Planning:  Goal: Ability to perform activities of daily living will improve  Description: Ability to perform activities of daily living will improve  Outcome: Met This Shift  Goal: Participates in care planning  Description: Participates in care planning  Outcome: Met This Shift     Problem: Injury - Risk of, Physical Injury:  Goal: Absence of physical injury  Description: Absence of physical injury  Outcome: Met This Shift  Goal: Will remain free from falls  Description: Will remain free from falls  Outcome: Met This Shift     Problem: Mood - Altered:  Goal: Mood stable  Description: Mood stable  Outcome: Met This Shift  Goal: Absence of abusive behavior  Description: Absence of abusive behavior  Outcome: Met This Shift  Goal: Verbalizations of feeling emotionally comfortable while being cared for will increase  Description: Verbalizations of feeling emotionally comfortable while being cared for will increase  Outcome: Met This Shift     Problem: Psychomotor Activity - Altered:  Goal: Absence of psychomotor disturbance signs and symptoms  Description: Absence of psychomotor disturbance signs and symptoms  Outcome: Met This Shift     Problem: Sensory Perception - Impaired:  Goal: Demonstrations of improved sensory functioning will increase  Description: Demonstrations of improved sensory functioning will increase  Outcome: Met This Shift  Goal: Decrease in sensory misperception frequency  Description: Decrease in sensory misperception frequency  Outcome: Met This Shift  Goal: Able to refrain from responding to false sensory perceptions  Description: Able to refrain from responding to false sensory perceptions  Outcome: Met This Shift  Goal: Demonstrates accurate environmental perceptions  Description: Demonstrates accurate environmental perceptions  Outcome: Met This Shift  Goal: Able to distinguish between reality-based and nonreality-based thinking  Description: Able to distinguish between reality-based and nonreality-based thinking  Outcome: Met This Shift  Goal: Able to interrupt nonreality-based thinking  Description: Able to interrupt nonreality-based thinking  Outcome: Met This Shift     Problem: Sleep Pattern Disturbance:  Goal: Appears well-rested  Description: Appears well-rested  Outcome: Met This Shift     Problem: Activity:  Goal: Risk for activity intolerance will decrease  Description: Risk for activity intolerance will decrease  Outcome: Met This Shift     Problem:  Bowel/Gastric:  Goal: Bowel function will improve  Description: Bowel function will improve  Outcome: Met This Shift  Goal: Diagnostic test results will improve  Description: Diagnostic test results will improve  Outcome: Met This Shift  Goal: Occurrences of nausea will decrease  Description: Occurrences of nausea will decrease  Outcome: Met This Shift  Goal: Occurrences of vomiting will decrease  Description: Occurrences of vomiting will decrease  Outcome: Met This Shift     Problem: Fluid Volume:  Goal: Maintenance of adequate hydration will improve  Description: Maintenance of adequate hydration will improve  Outcome: Met This Shift     Problem: Health Behavior:  Goal: Ability to state signs and symptoms to report to health care provider will improve  Description: Ability to state signs and symptoms to report to health care provider will improve  Outcome: Met This Shift     Problem: Physical Regulation:  Goal: Complications related to the disease process, condition or treatment will be avoided or minimized  Description: Complications related to the disease process, condition or treatment will be avoided or minimized  Outcome: Met This Shift  Goal: Ability to maintain clinical measurements within normal limits will improve  Description: Ability to maintain clinical measurements within normal limits will improve  Outcome: Met This Shift     Problem: Sensory:  Goal: Pain level will decrease  Description: Pain level will decrease  Outcome: Met This Shift  Goal: Ability to identify factors that increase the pain will improve  Description: Ability to identify factors that increase the pain will improve  Outcome: Met This Shift  Goal: Ability to notify healthcare provider of pain before it becomes unmanageable or unbearable will improve  Description: Ability to notify healthcare provider of pain before it becomes unmanageable or unbearable will improve  Outcome: Met This Shift     Problem: Falls - Risk of:  Goal: Absence of physical injury  Description: Absence of physical injury  Outcome: Met This Shift  Goal: Will remain free from falls  Description: Will remain free from falls  Outcome: Met This Shift     Problem: Skin Integrity:  Goal: Will show no infection signs and symptoms  Description: Will show no infection signs and symptoms  Outcome: Met This Shift  Goal: Absence of new skin breakdown  Description: Absence of new skin breakdown  Outcome: Met This Shift

## 2021-11-23 NOTE — PROGRESS NOTES
[facility]  PROGRESS NOTE  ATTENDING NOTE    Chief Complaint   Patient presents with    Abdominal Pain     Started a couple days ago. -n/v/d. Seen at [facility] and sent to ED. S: 79-year-old male status post perforated duodenal ulcer. I reviewed the risk factors with the patient he did not appear to have any risk factors. H. pylori is pending. Patient tolerating diet having normal bowel function denying any pain. Diarrhea improved. Had only 2 problems today. Tolerating diet. BRIGIDO drain has been removed    /88   Pulse 96   Temp 98.9 °F (37.2 °C) (Oral)   Resp 18   Ht 5' 10\" (1.778 m)   Wt 251 lb 1.6 oz (113.9 kg)   SpO2 95%   BMI 36.03 kg/m²   Physical Exam  Constitutional:       Appearance: Normal appearance. He is obese. HENT:      Head: Normocephalic and atraumatic. Nose: Nose normal.      Mouth/Throat:      Mouth: Mucous membranes are moist.      Pharynx: Oropharynx is clear. Eyes:      Extraocular Movements: Extraocular movements intact. Pupils: Pupils are equal, round, and reactive to light. Cardiovascular:      Rate and Rhythm: Normal rate and regular rhythm. Pulses: Normal pulses. Heart sounds: Normal heart sounds. Pulmonary:      Effort: Pulmonary effort is normal.      Breath sounds: Normal breath sounds. Abdominal:      General: There is no distension. Palpations: Abdomen is soft. Tenderness: There is no abdominal tenderness. Comments: Wounds clean dry and intact     Musculoskeletal:         General: No tenderness or signs of injury. Cervical back: Normal range of motion and neck supple. Skin:     General: Skin is warm and dry. Neurological:      General: No focal deficit present. Mental Status: He is alert and oriented to person, place, and time. Psychiatric:         Mood and Affect: Mood normal.         Behavior: Behavior normal.         Thought Content:  Thought content normal.         Judgment: Judgment normal.       ASSESSMENT/PLAN:  Perforated duodenal ulcer-status post laparoscopic modified Clois Ion patch  --Advance diet as tolerated to carb controlled diet  --Diabetes non-insulin-dependent--continue high sliding scale change diet  --Hypokalemia--improved  --SIRS--now off antibiotics, leukocytosis improved  --Acute respiratory failure status post surgery--resolved, off oxygen, continue pulmonary toilet, continue incentive spirometer  --acute blood loss anemia--monitor    --Diarrhea--start fiber capsules and Imodium as needed; could be from drinking the contrast for the upper GI the other day    DVT/GI ppx--lovenox, PPI    Sheldon Roldan MD, MSc, FACS  11/23/2021  3:20 PM

## 2021-11-23 NOTE — HOME CARE
[facility] referral received, awaiting final orders. Will need order for ok to start care on 11/27/21. Awaiting return call to verify demographics. Will follow. Medina Yanez LPN [facility].

## 2021-11-23 NOTE — CARE COORDINATION
Care Coordiantion  The patient has a adeline drain with 5 ml output serous sanguineous fluid in the last 12 hrs. He is tolerating a general diet. Ot ampac Is 19/24. He does not want the extended tube bench. I did speak to him about wanted hhc and he is in agreement with that so I made a referral to [facility] await if acceppted. Adeline drain will need removed prior to discharge. The patient is on Iv zosyn q8 hrs. The patient is post op day #5 lap modified grahm patch for a perforated duodenal ulcer. His Iv atb to be dc. Await Pt to see the patient today. Plan is home with no adeline and no iv atb and he is interested in just getting pt ot at home. [facility] will accept the patient for home pt ot and they will start on Saturday and we will need hhc orders on discharge.

## 2021-11-24 VITALS
TEMPERATURE: 98.2 F | DIASTOLIC BLOOD PRESSURE: 68 MMHG | WEIGHT: 251.1 LBS | BODY MASS INDEX: 35.95 KG/M2 | RESPIRATION RATE: 20 BRPM | HEART RATE: 71 BPM | HEIGHT: 70 IN | SYSTOLIC BLOOD PRESSURE: 140 MMHG | OXYGEN SATURATION: 98 %

## 2021-11-24 LAB
ALBUMIN SERPL-MCNC: 2.7 G/DL (ref 3.5–5.2)
ALP BLD-CCNC: 29 U/L (ref 40–129)
ALT SERPL-CCNC: 34 U/L (ref 0–40)
ANION GAP SERPL CALCULATED.3IONS-SCNC: 11 MMOL/L (ref 7–16)
AST SERPL-CCNC: 54 U/L (ref 0–39)
BASOPHILS ABSOLUTE: 0.02 E9/L (ref 0–0.2)
BASOPHILS RELATIVE PERCENT: 0.2 % (ref 0–2)
BILIRUB SERPL-MCNC: 0.5 MG/DL (ref 0–1.2)
BUN BLDV-MCNC: 6 MG/DL (ref 6–23)
CALCIUM SERPL-MCNC: 7.9 MG/DL (ref 8.6–10.2)
CHLORIDE BLD-SCNC: 101 MMOL/L (ref 98–107)
CO2: 26 MMOL/L (ref 22–29)
CREAT SERPL-MCNC: 1 MG/DL (ref 0.7–1.2)
EOSINOPHILS ABSOLUTE: 0.17 E9/L (ref 0.05–0.5)
EOSINOPHILS RELATIVE PERCENT: 1.9 % (ref 0–6)
GFR AFRICAN AMERICAN: >60
GFR NON-AFRICAN AMERICAN: >60 ML/MIN/1.73
GLUCOSE BLD-MCNC: 83 MG/DL (ref 74–99)
HCT VFR BLD CALC: 30 % (ref 37–54)
HELICOBACTER PYLORI IGG: NORMAL
HEMOGLOBIN: 9.2 G/DL (ref 12.5–16.5)
LYMPHOCYTES ABSOLUTE: 1.23 E9/L (ref 1.5–4)
LYMPHOCYTES RELATIVE PERCENT: 13.5 % (ref 20–42)
MAGNESIUM: 2.4 MG/DL (ref 1.6–2.6)
MCH RBC QN AUTO: 22.4 PG (ref 26–35)
MCHC RBC AUTO-ENTMCNC: 30.7 % (ref 32–34.5)
MCV RBC AUTO: 73.2 FL (ref 80–99.9)
METER GLUCOSE: 125 MG/DL (ref 74–99)
METER GLUCOSE: 88 MG/DL (ref 74–99)
MONOCYTES ABSOLUTE: 1.12 E9/L (ref 0.1–0.95)
MONOCYTES RELATIVE PERCENT: 12.3 % (ref 2–12)
NEUTROPHILS ABSOLUTE: 6.39 E9/L (ref 1.8–7.3)
NEUTROPHILS RELATIVE PERCENT: 69.8 % (ref 43–80)
PDW BLD-RTO: 15.1 FL (ref 11.5–15)
PLATELET # BLD: 228 E9/L (ref 130–450)
PMV BLD AUTO: 11.1 FL (ref 7–12)
POTASSIUM REFLEX MAGNESIUM: 3.4 MMOL/L (ref 3.5–5)
RBC # BLD: 4.1 E12/L (ref 3.8–5.8)
SODIUM BLD-SCNC: 138 MMOL/L (ref 132–146)
TOTAL PROTEIN: 5.6 G/DL (ref 6.4–8.3)
WBC # BLD: 9.1 E9/L (ref 4.5–11.5)

## 2021-11-24 PROCEDURE — 80053 COMPREHEN METABOLIC PANEL: CPT

## 2021-11-24 PROCEDURE — 2580000003 HC RX 258: Performed by: STUDENT IN AN ORGANIZED HEALTH CARE EDUCATION/TRAINING PROGRAM

## 2021-11-24 PROCEDURE — 6370000000 HC RX 637 (ALT 250 FOR IP): Performed by: STUDENT IN AN ORGANIZED HEALTH CARE EDUCATION/TRAINING PROGRAM

## 2021-11-24 PROCEDURE — 85025 COMPLETE CBC W/AUTO DIFF WBC: CPT

## 2021-11-24 PROCEDURE — 99024 POSTOP FOLLOW-UP VISIT: CPT | Performed by: SURGERY

## 2021-11-24 PROCEDURE — 36415 COLL VENOUS BLD VENIPUNCTURE: CPT

## 2021-11-24 PROCEDURE — 6370000000 HC RX 637 (ALT 250 FOR IP): Performed by: SURGERY

## 2021-11-24 PROCEDURE — 83735 ASSAY OF MAGNESIUM: CPT

## 2021-11-24 PROCEDURE — 6370000000 HC RX 637 (ALT 250 FOR IP)

## 2021-11-24 PROCEDURE — 82962 GLUCOSE BLOOD TEST: CPT

## 2021-11-24 PROCEDURE — 94640 AIRWAY INHALATION TREATMENT: CPT

## 2021-11-24 RX ADMIN — METOPROLOL SUCCINATE 50 MG: 50 TABLET, EXTENDED RELEASE ORAL at 08:33

## 2021-11-24 RX ADMIN — SUCRALFATE 1 G: 1 TABLET ORAL at 06:29

## 2021-11-24 RX ADMIN — SODIUM CHLORIDE, PRESERVATIVE FREE 10 ML: 5 INJECTION INTRAVENOUS at 08:36

## 2021-11-24 RX ADMIN — PRAVASTATIN SODIUM 40 MG: 20 TABLET ORAL at 08:33

## 2021-11-24 RX ADMIN — CALCIUM POLYCARBOPHIL 625 MG: 625 TABLET, FILM COATED ORAL at 08:33

## 2021-11-24 RX ADMIN — POTASSIUM BICARBONATE 40 MEQ: 782 TABLET, EFFERVESCENT ORAL at 11:23

## 2021-11-24 RX ADMIN — AMLODIPINE BESYLATE 10 MG: 10 TABLET ORAL at 08:33

## 2021-11-24 RX ADMIN — IPRATROPIUM BROMIDE AND ALBUTEROL SULFATE 1 AMPULE: .5; 2.5 SOLUTION RESPIRATORY (INHALATION) at 09:01

## 2021-11-24 RX ADMIN — SUCRALFATE 1 G: 1 TABLET ORAL at 00:59

## 2021-11-24 RX ADMIN — PANTOPRAZOLE SODIUM 40 MG: 40 TABLET, DELAYED RELEASE ORAL at 06:29

## 2021-11-24 RX ADMIN — SUCRALFATE 1 G: 1 TABLET ORAL at 12:03

## 2021-11-24 ASSESSMENT — PAIN SCALES - GENERAL: PAINLEVEL_OUTOF10: 0

## 2021-11-24 NOTE — CARE COORDINATION
Discharge order noted. Patient is Pod #6 modified Jose Roberto patch for perforated duodenal ulcer. Plan is home with Liliane. Laura Chinchilla from [facility] notified of patient discharging today. Home health care orders are in. He is on a Regular Diet. Per general surgery not today, Ok for home. I met with patient in room. His daughter will transport him to his sister in [place] [address] to get his car keys and apartment keys.  Juventino Wayne RN CM

## 2021-11-24 NOTE — PROGRESS NOTES
[facility]  PROGRESS NOTE  ATTENDING NOTE    Chief Complaint   Patient presents with    Abdominal Pain     Started a couple days ago. -n/v/d. Seen at [facility] and sent to ED. S: 65-year-old male status post perforated duodenal ulcer. I reviewed the risk factors with the patient he did not appear to have any risk factors. H. pylori is pending. Patient tolerating diet having normal bowel function denying any pain. Ready to go home however it sounds like he is can have some difficulty getting home as his car keys are 1 house in his house keys or another house. We will have  help him with a ride to get to his car keys. He is complaining of sore throat from having had the NG tube in place. He has been ordered some throat lozengers. BP (!) 140/68   Pulse 71   Temp 98.2 °F (36.8 °C) (Oral)   Resp 20   Ht 5' 10\" (1.778 m)   Wt 251 lb 1.6 oz (113.9 kg)   SpO2 98%   BMI 36.03 kg/m²   Physical Exam  Constitutional:       Appearance: Normal appearance. He is obese. HENT:      Head: Normocephalic and atraumatic. Nose: Nose normal.      Mouth/Throat:      Mouth: Mucous membranes are moist.      Pharynx: Oropharynx is clear. Eyes:      Extraocular Movements: Extraocular movements intact. Pupils: Pupils are equal, round, and reactive to light. Cardiovascular:      Rate and Rhythm: Normal rate and regular rhythm. Pulses: Normal pulses. Heart sounds: Normal heart sounds. Pulmonary:      Effort: Pulmonary effort is normal.      Breath sounds: Normal breath sounds. Abdominal:      General: There is no distension. Palpations: Abdomen is soft. Tenderness: There is no abdominal tenderness. Comments: Wounds clean dry and intact     Musculoskeletal:         General: No tenderness or signs of injury. Cervical back: Normal range of motion and neck supple. Skin:     General: Skin is warm and dry.    Neurological:      General: No focal deficit present. Mental Status: He is alert and oriented to person, place, and time. Psychiatric:         Mood and Affect: Mood normal.         Behavior: Behavior normal.         Thought Content:  Thought content normal.         Judgment: Judgment normal.       ASSESSMENT/PLAN:  Perforated duodenal ulcer-status post laparoscopic modified Willadean Jacome patch  --Advance diet as tolerated to carb controlled diet  --Diabetes non-insulin-dependent--continue high sliding scale change diet  --Hypokalemia--replace  --Acute respiratory failure status post surgery--resolved, off oxygen, continue pulmonary toilet, continue incentive spirometer  --acute blood loss anemia--monitor    --Diarrhea--resolved    DVT/GI ppx--lovenox, PPI    Ok for home    Simone Alvarez MD, MSc, FACS  11/24/2021  11:56 AM

## 2021-12-07 ENCOUNTER — TELEPHONE (OUTPATIENT)
Dept: SURGERY | Age: 81
End: 2021-12-07

## 2021-12-08 ENCOUNTER — OFFICE VISIT (OUTPATIENT)
Dept: SURGERY | Age: 81
DRG: 639 | End: 2021-12-08
Payer: MEDICARE

## 2021-12-08 ENCOUNTER — TELEPHONE (OUTPATIENT)
Dept: SURGERY | Age: 81
End: 2021-12-08

## 2021-12-08 VITALS
DIASTOLIC BLOOD PRESSURE: 57 MMHG | HEIGHT: 70 IN | BODY MASS INDEX: 35.93 KG/M2 | SYSTOLIC BLOOD PRESSURE: 104 MMHG | OXYGEN SATURATION: 100 % | WEIGHT: 251 LBS | HEART RATE: 88 BPM | TEMPERATURE: 97.3 F

## 2021-12-08 DIAGNOSIS — M96.89 POSTOPERATIVE SURGICAL COMPLICATION INVOLVING MUSCULOSKELETAL SYSTEM ASSOCIATED WITH MUSCULOSKELETAL PROCEDURE, UNSPECIFIED COMPLICATION: Primary | ICD-10-CM

## 2021-12-08 DIAGNOSIS — R60.9 PERIPHERAL EDEMA: ICD-10-CM

## 2021-12-08 PROCEDURE — 1123F ACP DISCUSS/DSCN MKR DOCD: CPT | Performed by: SURGERY

## 2021-12-08 PROCEDURE — 1036F TOBACCO NON-USER: CPT | Performed by: SURGERY

## 2021-12-08 PROCEDURE — 99214 OFFICE O/P EST MOD 30 MIN: CPT | Performed by: SURGERY

## 2021-12-08 PROCEDURE — 99212 OFFICE O/P EST SF 10 MIN: CPT | Performed by: SURGERY

## 2021-12-08 PROCEDURE — 4040F PNEUMOC VAC/ADMIN/RCVD: CPT | Performed by: SURGERY

## 2021-12-08 PROCEDURE — 1111F DSCHRG MED/CURRENT MED MERGE: CPT | Performed by: SURGERY

## 2021-12-08 PROCEDURE — G8427 DOCREV CUR MEDS BY ELIG CLIN: HCPCS | Performed by: SURGERY

## 2021-12-08 PROCEDURE — G8417 CALC BMI ABV UP PARAM F/U: HCPCS | Performed by: SURGERY

## 2021-12-08 PROCEDURE — G8484 FLU IMMUNIZE NO ADMIN: HCPCS | Performed by: SURGERY

## 2021-12-08 RX ORDER — CLARITHROMYCIN 500 MG/1
500 TABLET, COATED ORAL 2 TIMES DAILY
Qty: 28 TABLET | Refills: 0 | Status: SHIPPED | OUTPATIENT
Start: 2021-12-08 | End: 2021-12-22

## 2021-12-08 RX ORDER — PANTOPRAZOLE SODIUM 40 MG/1
40 TABLET, DELAYED RELEASE ORAL
Qty: 60 TABLET | Refills: 3 | Status: SHIPPED
Start: 2021-12-08 | End: 2022-03-03 | Stop reason: SDUPTHER

## 2021-12-08 RX ORDER — AMOXICILLIN 500 MG/1
1000 CAPSULE ORAL 2 TIMES DAILY
Qty: 56 CAPSULE | Refills: 0 | Status: SHIPPED | OUTPATIENT
Start: 2021-12-08 | End: 2021-12-22

## 2021-12-08 RX ORDER — ASPIRIN 81 MG/1
81 TABLET ORAL DAILY
COMMUNITY
End: 2022-01-11

## 2021-12-08 NOTE — PATIENT INSTRUCTIONS
Patient Information and Instructions for  Upper GI Endoscopy or Esophagogastroduodenoscopy [EGD])         Definition Upper GI Endoscopy or Esophagogastroduodenoscopy [EGD])  This is a test that uses a fiberoptic scope to examine the esophagus (throat), stomach, and upper part of the small intestines. Upper GI endoscopy may be recommended if you have:   Abdominal pain   Severe heartburn   Persistent nausea and vomiting   Difficulty swallowing   Blood in stool or vomit   Abnormal x-ray or other examinations of the gastrointestinal tract     Conditions that can be diagnosed with upper GI endoscopy include:   Ulcers   Tumors   Polyps   Abnormal narrowing   Inflammation     Possible Complications   Complications are rare, but no procedure is completely free of risk. If you are planning to have upper GI endoscopy, your doctor will review a list of possible complications, which may include:   Bleeding   Damage to the esophagus, stomach, or intestine   Infection   Respiratory depression (reduced breathing rate and/or depth)   Reaction to sedatives or anesthesia causing your blood pressure to drop    Some factors that may increase the risk of complications include:   Age: 61 or older   Pregnancy   Obesity   Smoking , alcoholism , or drug use   Malnutrition   Recent illness   Diabetes   Heart or lung problems   Bleeding disorders   Use of certain medicines     Be sure to discuss these risks with your doctor before the test.     What to Expect   Prior to test   Leading up to the test:   Arrange for a ride home after the test. Also, arrange for help at home. The night before, eat a light meal.  Do not eat or drink anything after midnight the night before the test.   Talk to your doctor about your medicines.  You may be asked to stop taking some medicines up to one week before the procedure, like:   Anti-inflammatory drugs (e.g., aspirin )   Blood thinners, like clopidogrel (Plavix) or warfarin (Coumadin)     Description of the Test   You will be asked to lie on your left side. You will have monitors tracking your breathing, heart rate, and blood oxygen levels. You will be given supplemental oxygen to breathe through your nose. A mouthpiece will be positioned to help keep your mouth open. Your throat may be sprayed with a numbing medicine. You will be given a sedative through an IV to help you relax during the test.  During the test, a small suction tube will be used to clear saliva and fluids from your mouth. The endoscope will be lubricated and placed in your mouth. You will be asked to try to swallow it. Then, it will be carefully and slowly advanced down your throat. It will be passed through your esophagus and into your stomach and intestine. While the endoscope is being advanced, your doctor will view the images on the screen. Air will be passed through the endoscope into your digestive tract. This will be done to smooth the normal folds in the tissues, allowing your doctor to view the tissue more easily. Tiny tools may be passed through the endoscope in order to take biopsies or do other tests. After Test   After the test, you will be observed for an hour. Then, you will be allowed to go home. When you return home after the test, do the following to help ensure a smooth recovery:   Rest when you get home. Ask your doctor if you can resume your normal diet. In most cases, you will be able to. Sedatives can slow your reaction time. Do not drive or use machinery for the rest of the day. Avoid alcohol for the rest of the day. Be sure to follow your doctor's instructions . How Long Will It Take? Usually about 10-15 minutes     Will It Hurt? Most people do not feel anything during the test and will not remember the test.  After the test, your throat may be sore and you may feel bloated. Results   This test gives your doctor information about the health of your digestive system.  The results can help to explain your symptoms. You and your doctor will talk about the results and your treatment plan. Call Your Doctor   After the test, call your doctor if any of the following occurs:   Signs of infection, including fever and chills   Severe abdominal pain   Hard, swollen abdomen   Difficulty swallowing or breathing   Any change or increase in your original symptoms   Bloody or black tarry colored stools   Nausea and/or vomiting   Cough, shortness of breath, or chest pain   Bleeding     In case of emergency, call 911. [place] [address] Kristy Cheek, [facility] [address]  Ph: [phone]    Please contact [facility] at [phone] with any questions or concerns. [facility] [phone] will complete the followin. Contact [facility] re: coverage for emergency response system and bath safety items    2. Refer to [facility] [address] [place] [address]) [phone] for assessment for homemaker and aide services    3.   Provide list of private pay agency detention care providers      Please call with any questions

## 2021-12-08 NOTE — PROGRESS NOTES
Postop Progress Note    Subjective:  Patient states he has been tolerating a diet and having better bowel movements now originally he was having some constipation. No fevers or chills. Lower than normal appetite. Of note he has had multiple falls at home after the operation. One time he was on the ground per 1.5 days a neighbor finally heard him and called EMS. They helped him up he was hypoglycemic and was given dextrose and then did not want to proceed the hospital.  His daughter is with him in from [place]. He denies ever hitting his head no loss of consciousness, no chest pain, no new abdominal or flank pains. No change in range of motion in any extremities or new extremity pain. Objective:   Physical Exam  HENT:      Head: Normocephalic. Mouth/Throat:      Mouth: Mucous membranes are moist.   Eyes:      Extraocular Movements: Extraocular movements intact. Pupils: Pupils are equal, round, and reactive to light. Neck:      Comments: No midline neck pain  Cardiovascular:      Rate and Rhythm: Normal rate. Pulmonary:      Effort: Pulmonary effort is normal. No respiratory distress. Abdominal:      General: Abdomen is flat. There is no distension. Tenderness: There is no abdominal tenderness. There is no guarding or rebound. Musculoskeletal:         General: Normal range of motion. Cervical back: Normal range of motion and neck supple. Comments: Normal range of motion no new extremity pain since recent falls. No midline thoracic or lumbar tenderness no chest wall tenderness. Skin:     General: Skin is warm. Neurological:      General: No focal deficit present. Mental Status: He is alert.    Psychiatric:         Mood and Affect: Mood normal.       Incision: healing well, no drainage, no erythema, no hernia, no seroma, no swelling, well approximated    Assessment:  Visit Diagnoses and Associated Orders     ORDERS WITHOUT AN ASSOCIATED DIAGNOSIS    aspirin 81 MG

## 2021-12-08 NOTE — PROGRESS NOTES
Rocco Abreu is scheduled for a EGD with Dr Troy Mejia on 02/07/2022 @ 10:00am, arrival time 9:00am. Patient has been notified of the appointment and a letter has been given to the patient in clinic. Patient has been told to make sure they have a ride and to park in the [facility] and report through the outpatient entrance of the hospital facing [place] for same day surgery.     Electronically signed by Kamryn Guerra on 12/8/21 at 2:43 PM EST

## 2021-12-08 NOTE — TELEPHONE ENCOUNTER
Prior Authorization Form:      DEMOGRAPHICS:                     Patient Name:  Yolanda Savage  Patient :  1940            Insurance:  Payor: Terrell Malin / Plan: Karla RAMSEY PLUS HMO / Product Type: *No Product type* /   Insurance ID Number:    Payor/Plan Subscr  Sex Relation Sub. Ins. ID Effective Group Num   1.  NARENDRA Butler 1940 Male Self [id number] 1/1/18 [id number]                                    [address] [id number]         DIAGNOSIS & PROCEDURE:                       Procedure/Operation: EGD           CPT Code: 69380    Diagnosis:  Duodenal ulcer perforation    ICD10 Code: K26.5    [facility]:  [facility]    Surgeon:  Dr Keri Harris INFORMATION:                          Date: 2022    Time: 1000              [facility]                                                       Status:  Outpatient        Special Comments:  NA       Electronically signed by Irving Dahl on 2021 at 2:43 PM

## 2021-12-09 ENCOUNTER — SOCIAL WORK (OUTPATIENT)
Dept: INTERNAL MEDICINE | Age: 81
End: 2021-12-09

## 2021-12-09 NOTE — PROGRESS NOTES
Consult from Surgical staff (Dr. Tye German) for care coordination of services as pt has fallen at home since discharge. LSW met with pt and daughter at Surgical appt on 12.8.21 , Ricardo Huang is visiting from [place] and leaving on 12.12.21. Pt is  and resides alone in a 1 st floor [facility] [place]; (home number verified as [phone] and cell # [phone]); previously lived in [facility] [place]. Pt states [facility] PT came for 1 visit post hospital discharge and reports OT never contacted; pt states therapist felt he was doing well and further visits declined. Pt reports falling 3 times since discharge; reports lying on floor for 1.5 days until able to scoot over to door in order to pound on door and neighbor called EMS. Pt states most struggle has been navigating his high bed and getting out of bed safely; (has history of bilateral knee replacements) recently borrowed a handle to place under mattress which has helped. Pt reports having services from office of elderly affairs in past, but discontinued due to inconsistencey of staff showing up. Pt reports difficulty with laundry as appliances are upstairs as well as cleaning; states able to complete grooming; does use straight cane for ambulation. Discussed difference in skilled care vs personal care. Pt is unsure of what benefits he has thru his [employer]; discussed [facility] referral as if pt;s income is over PASSPORT guidelines, may be eligible for [employer] which both would like to pursue. Did provide self pay personal care agency list to both pt and daughter as well as LSW card to call as needed. Advised LSW will make calls to insurance company for possible benefits which they are unaware of. (See below for outcome)    MA completed referral to [facility] with pt on 12.9.21;  reports last PCP was in [place] and has not seen for 1 year; states had refills on meds; advised will need

## 2021-12-10 DIAGNOSIS — Z60.2 LIVES ALONE: Primary | ICD-10-CM

## 2021-12-10 SDOH — SOCIAL STABILITY - SOCIAL INSECURITY: PROBLEMS RELATED TO LIVING ALONE: Z60.2

## 2021-12-11 ENCOUNTER — HOSPITAL ENCOUNTER (INPATIENT)
Age: 81
LOS: 4 days | Discharge: NURSING, REHAB OR LONG-TERM CARE | DRG: 639 | End: 2021-12-15
Attending: STUDENT IN AN ORGANIZED HEALTH CARE EDUCATION/TRAINING PROGRAM | Admitting: INTERNAL MEDICINE
Payer: MEDICARE

## 2021-12-11 ENCOUNTER — NURSE TRIAGE (OUTPATIENT)
Dept: OTHER | Facility: CLINIC | Age: 81
End: 2021-12-11

## 2021-12-11 DIAGNOSIS — E16.0 HYPOGLYCEMIA SECONDARY TO SULFONYLUREA, ACCIDENTAL OR UNINTENTIONAL, INITIAL ENCOUNTER: Primary | ICD-10-CM

## 2021-12-11 DIAGNOSIS — T38.3X1A HYPOGLYCEMIA SECONDARY TO SULFONYLUREA, ACCIDENTAL OR UNINTENTIONAL, INITIAL ENCOUNTER: Primary | ICD-10-CM

## 2021-12-11 PROBLEM — E16.2 HYPOGLYCEMIA: Status: ACTIVE | Noted: 2021-12-11

## 2021-12-11 LAB
ALP BLD-CCNC: 41 U/L (ref 40–129)
ALT SERPL-CCNC: 43 U/L (ref 0–40)
ANION GAP SERPL CALCULATED.3IONS-SCNC: 10 MMOL/L (ref 7–16)
AST SERPL-CCNC: 54 U/L (ref 0–39)
BACTERIA: NORMAL /HPF
BASOPHILS ABSOLUTE: 0.02 E9/L (ref 0–0.2)
BASOPHILS RELATIVE PERCENT: 0.4 % (ref 0–2)
BILIRUB SERPL-MCNC: 0.3 MG/DL (ref 0–1.2)
BILIRUBIN DIRECT: 0.2 MG/DL (ref 0–0.3)
BILIRUBIN URINE: NEGATIVE
BILIRUBIN, INDIRECT: 0.1 MG/DL (ref 0–1)
BLOOD, URINE: NEGATIVE
BUN BLDV-MCNC: 10 MG/DL (ref 6–23)
CALCIUM SERPL-MCNC: 9.1 MG/DL (ref 8.6–10.2)
CHLORIDE BLD-SCNC: 98 MMOL/L (ref 98–107)
CHP ED QC CHECK: YES
CLARITY: CLEAR
CO2: 25 MMOL/L (ref 22–29)
COLOR: YELLOW
CREAT SERPL-MCNC: 1 MG/DL (ref 0.7–1.2)
EOSINOPHILS ABSOLUTE: 0.1 E9/L (ref 0.05–0.5)
EOSINOPHILS RELATIVE PERCENT: 2.1 % (ref 0–6)
GFR AFRICAN AMERICAN: >60
GFR NON-AFRICAN AMERICAN: >60 ML/MIN/1.73
GLUCOSE BLD-MCNC: 39 MG/DL (ref 74–99)
GLUCOSE BLD-MCNC: 57 MG/DL
GLUCOSE URINE: NEGATIVE MG/DL
HCT VFR BLD CALC: 29.6 % (ref 37–54)
HEMOGLOBIN: 9.1 G/DL (ref 12.5–16.5)
IMMATURE GRANULOCYTES #: 0.02 E9/L
IMMATURE GRANULOCYTES %: 0.4 % (ref 0–5)
KETONES, URINE: NEGATIVE MG/DL
LEUKOCYTE ESTERASE, URINE: NEGATIVE
LYMPHOCYTES ABSOLUTE: 0.82 E9/L (ref 1.5–4)
LYMPHOCYTES RELATIVE PERCENT: 17.2 % (ref 20–42)
MAGNESIUM: 2.2 MG/DL (ref 1.6–2.6)
MCH RBC QN AUTO: 22.4 PG (ref 26–35)
MCHC RBC AUTO-ENTMCNC: 30.7 % (ref 32–34.5)
MCV RBC AUTO: 72.9 FL (ref 80–99.9)
METER GLUCOSE: 41 MG/DL (ref 74–99)
METER GLUCOSE: 52 MG/DL (ref 74–99)
METER GLUCOSE: 53 MG/DL (ref 74–99)
METER GLUCOSE: 57 MG/DL (ref 74–99)
METER GLUCOSE: 57 MG/DL (ref 74–99)
METER GLUCOSE: 61 MG/DL (ref 74–99)
METER GLUCOSE: 80 MG/DL (ref 74–99)
METER GLUCOSE: <40 MG/DL (ref 74–99)
MONOCYTES ABSOLUTE: 0.63 E9/L (ref 0.1–0.95)
MONOCYTES RELATIVE PERCENT: 13.2 % (ref 2–12)
NEUTROPHILS ABSOLUTE: 3.19 E9/L (ref 1.8–7.3)
NEUTROPHILS RELATIVE PERCENT: 66.7 % (ref 43–80)
NITRITE, URINE: NEGATIVE
PDW BLD-RTO: 15.4 FL (ref 11.5–15)
PH UA: 7 (ref 5–9)
PLATELET # BLD: 337 E9/L (ref 130–450)
PMV BLD AUTO: 9.8 FL (ref 7–12)
POTASSIUM SERPL-SCNC: 4.6 MMOL/L (ref 3.5–5)
PROTEIN UA: NEGATIVE MG/DL
RBC # BLD: 4.06 E12/L (ref 3.8–5.8)
RBC UA: NORMAL /HPF (ref 0–2)
SODIUM BLD-SCNC: 133 MMOL/L (ref 132–146)
SPECIFIC GRAVITY UA: 1.01 (ref 1–1.03)
UROBILINOGEN, URINE: 0.2 E.U./DL
WBC # BLD: 4.8 E9/L (ref 4.5–11.5)
WBC UA: NORMAL /HPF (ref 0–5)

## 2021-12-11 PROCEDURE — G0378 HOSPITAL OBSERVATION PER HR: HCPCS

## 2021-12-11 PROCEDURE — 2500000003 HC RX 250 WO HCPCS: Performed by: STUDENT IN AN ORGANIZED HEALTH CARE EDUCATION/TRAINING PROGRAM

## 2021-12-11 PROCEDURE — 82962 GLUCOSE BLOOD TEST: CPT

## 2021-12-11 PROCEDURE — 1200000000 HC SEMI PRIVATE

## 2021-12-11 PROCEDURE — 99283 EMERGENCY DEPT VISIT LOW MDM: CPT

## 2021-12-11 RX ORDER — DEXTROSE MONOHYDRATE 25 G/50ML
25 INJECTION, SOLUTION INTRAVENOUS PRN
Status: DISCONTINUED | OUTPATIENT
Start: 2021-12-11 | End: 2021-12-12 | Stop reason: SDUPTHER

## 2021-12-11 RX ORDER — 0.9 % SODIUM CHLORIDE 0.9 %
1000 INTRAVENOUS SOLUTION INTRAVENOUS ONCE
Status: DISCONTINUED | OUTPATIENT
Start: 2021-12-11 | End: 2021-12-15 | Stop reason: HOSPADM

## 2021-12-11 RX ADMIN — DEXTROSE MONOHYDRATE 25 G: 25 INJECTION, SOLUTION INTRAVENOUS at 23:40

## 2021-12-11 RX ADMIN — DEXTROSE MONOHYDRATE 25 G: 25 INJECTION, SOLUTION INTRAVENOUS at 17:25

## 2021-12-11 RX ADMIN — DEXTROSE MONOHYDRATE 25 G: 25 INJECTION, SOLUTION INTRAVENOUS at 16:25

## 2021-12-11 RX ADMIN — DEXTROSE MONOHYDRATE 25 G: 25 INJECTION, SOLUTION INTRAVENOUS at 20:04

## 2021-12-11 ASSESSMENT — PAIN SCALES - GENERAL: PAINLEVEL_OUTOF10: 0

## 2021-12-11 NOTE — ED NOTES
[facility]  [address] [facility] Rayna Zapata RN  12/11/21 [id number]

## 2021-12-11 NOTE — TELEPHONE ENCOUNTER
Received call from [facility] at [facility] with Red Flag Complaint. Brief description of triage: low blood sugar reoccurring no symptoms    Triage indicates for patient to call PCP within 24 hours given office is closed plan is for pt to be seen at urgent care may need adjustments to medications     Care advice provided, patient verbalizes understanding; denies any other questions or concerns; instructed to call back for any new or worsening symptoms. Attention Provider: Thank you for allowing me to participate in the care of your patient. The patient was connected to triage in response to information provided to the ECC/PSC. Please do not respond through this encounter as the response is not directed to a shared pool. Reason for Disposition   [1] Blood glucose < 70  mg/dL (3.9 mmol/L) or symptomatic, now improved with Care Advice AND [2] cause unknown    Answer Assessment - Initial Assessment Questions  1. SYMPTOMS: \"What symptoms are you concerned about? \"      Low blood sugars reoccurring     2. ONSET:  \"When did the symptoms start? \"      No symptoms     3. BLOOD GLUCOSE: \"What is your blood glucose level? \"       60-70s , first woke 20's per pt was asymptomatic     4. USUAL RANGE: \"What is your blood glucose level usually? \" (e.g., usual fasting morning value, usual evening value)      Checks it sometimes     5. TYPE 1 or 2:  \"Do you know what type of diabetes you have? \"  (e.g., Type 1, Type 2, Gestational; doesn't know)       Type II    6. INSULIN: \"Do you take insulin? \" \"What type of insulin(s) do you use? What is the mode of delivery? (syringe, pen; injection or pump) \"When did you last give yourself an insulin dose? \" (i.e., time or hours/minutes ago) \"How much did you give? \" (i.e., how many units)      Denies    7. DIABETES PILLS: \"Do you take any pills for your diabetes? \"      Yes metformin BID    8. OTHER SYMPTOMS: \"Do you have any symptoms? \" (e.g., fever, frequent urination, difficulty breathing, vomiting)      denies    9. LOW BLOOD GLUCOSE TREATMENT: \"What have you done so far to treat the low blood glucose level? \"      Patient ate and reading went up    10. FOOD: \"When did you last eat or drink? \"        This morning had piece of sausage drank water     11. ALONE: Briana Shaffer you alone right now or is someone with you? \"        One daughter is there   Pt had recent surgery eating less    12. PREGNANCY: \"Is there any chance you are pregnant? \" \"When was your last menstrual period? \"        n/a    Protocols used: DIABETES - LOW BLOOD SUGAR-ADULT-AH

## 2021-12-11 NOTE — ED PROVIDER NOTES
Department of Emergency Medicine   ED  Provider Note  Admit Date/RoomTime: 12/11/2021  2:10 PM  ED Room: 8416/8416-A          History of Present Illness:  12/11/21, Time: 2:44 PM EST  Chief Complaint   Patient presents with    Hypoglycemia     had EXP lap 2 weeks ago, blood sugar has been low intermittently, today was 51 at home and 57 here was given oral juice         Jamie Mascorro is a 80 y.o. male presenting to the ED for Hypoglycemia. Apparently, the patient was recently admitted for a gastric ulcer started on PPI prophylaxis subsequent to discharge. He underwent exploratory laparotomy. He had a duodenal ulcer with perforation according to the EMR. Additionally, the patient is a controlled type II diabetic. However, as he was not eating and he was ill in the hospital he went from 250 pounds to 228 pounds. This is unintentional weight loss due to the fact that he was ill. He has a slightly decreased appetite since discharge as it is still having some mild intermittent abdominal pain but this is improving. He is tolerating p.o. without difficulty denies any fevers chills or myalgias as well as abdominal pain, cough shortness of breath dysuria or hematuria. He checks his sugars at home and this morning it was 51. He called EMS because he was feeling unwell and his sugar subsequently was 21. He had been a to julisa bryant and it went up to 90. Sugar here on fingerstick glucose was 57. Of note, the patient had not changed his diabetic medication is on Metformin 500 mg twice daily and also on glimepiride. Last dose of glimepiride was last night at 9 PM.    Review of Systems:  Review of systems obtained and negative unless stated otherwise above in the HPI.    --------------------------------------------- PAST HISTORY ---------------------------------------------  Past Medical History:  has a past medical history of [facility]     Past Surgical History:  has a past surgical history that includes laparoscopic appendectomy (N/A, 11/18/2021); Upper gastrointestinal endoscopy (N/A, 11/18/2021); and joint replacement (2018). Social History:  reports that he quit smoking about 20 years ago. He has never used smokeless tobacco. He reports previous alcohol use. He reports that he does not use drugs. Family History: family history includes Cancer in his brother; Diabetes in his mother; Heart Attack in his father. . Unless otherwise noted, family history is non contributory    The patients home medications have been reviewed. Allergies: Patient has no known allergies. I have reviewed the past medical history, past surgical history, social history, and family history    ---------------------------------------------------PHYSICAL EXAM--------------------------------------    Constitutional: Appears in no distress  Head: Normocephalic, atraumatic  Eyes: Non-icteric slcera, no conjunctival injection  ENT: Moist mucous membranes,  Neck: Trachea midline, no JVD  Respiratory: Nonlabored respirations. Lungs clear to auscultation bilaterally, no wheezes, rales, or rhonchi. Cardiovascular: Regular rate. Regular rhythm. No murmurs, no gallops, no rubs. Gastrointestinal: Abdomen Soft, Non tender, Non distended. No rebound tenderness, guarding, or rigidity. ,  Trocar holes appear to be healing nicely subsequent to exploratory laparotomy  Extremities: No lower extremity edema  Genitourinary: No CVA tenderness, no suprapubic tenderness  Musculoskeletal: Moves all extremities, no deformity  Skin: Pink, warm, dry without rash. Neurologic: Alert, symmetric facies, no aphasia    -------------------------------------------------- RESULTS -------------------------------------------------  I have personally reviewed all laboratory and imaging results for this patient. Results are listed below.      LABS: (Lab results interpreted by me)  Results for orders placed or performed during the hospital encounter of 36/99/18   Basic Metabolic Panel   Result Value Ref Range    Sodium 133 132 - 146 mmol/L    Potassium 4.6 3.5 - 5.0 mmol/L    Chloride 98 98 - 107 mmol/L    CO2 25 22 - 29 mmol/L    Anion Gap 10 7 - 16 mmol/L    Glucose 39 (LL) 74 - 99 mg/dL    BUN 10 6 - 23 mg/dL    CREATININE 1.0 0.7 - 1.2 mg/dL    GFR Non-African American >60 >=60 mL/min/1.73    GFR African American >60     Calcium 9.1 8.6 - 10.2 mg/dL   Magnesium   Result Value Ref Range    Magnesium 2.2 1.6 - 2.6 mg/dL   CBC auto differential   Result Value Ref Range    WBC 4.8 4.5 - 11.5 E9/L    RBC 4.06 3.80 - 5.80 E12/L    Hemoglobin 9.1 (L) 12.5 - 16.5 g/dL    Hematocrit 29.6 (L) 37.0 - 54.0 %    MCV 72.9 (L) 80.0 - 99.9 fL    MCH 22.4 (L) 26.0 - 35.0 pg    MCHC 30.7 (L) 32.0 - 34.5 %    RDW 15.4 (H) 11.5 - 15.0 fL    Platelets 915 914 - 862 E9/L    MPV 9.8 7.0 - 12.0 fL    Neutrophils % 66.7 43.0 - 80.0 %    Immature Granulocytes % 0.4 0.0 - 5.0 %    Lymphocytes % 17.2 (L) 20.0 - 42.0 %    Monocytes % 13.2 (H) 2.0 - 12.0 %    Eosinophils % 2.1 0.0 - 6.0 %    Basophils % 0.4 0.0 - 2.0 %    Neutrophils Absolute 3.19 1.80 - 7.30 E9/L    Immature Granulocytes # 0.02 E9/L    Lymphocytes Absolute 0.82 (L) 1.50 - 4.00 E9/L    Monocytes Absolute 0.63 0.10 - 0.95 E9/L    Eosinophils Absolute 0.10 0.05 - 0.50 E9/L    Basophils Absolute 0.02 0.00 - 0.20 E9/L   Urinalysis with Microscopic   Result Value Ref Range    Color, UA Yellow Straw/Yellow    Clarity, UA Clear Clear    Glucose, Ur Negative Negative mg/dL    Bilirubin Urine Negative Negative    Ketones, Urine Negative Negative mg/dL    Specific Gravity, UA 1.010 1.005 - 1.030    Blood, Urine Negative Negative    pH, UA 7.0 5.0 - 9.0    Protein, UA Negative Negative mg/dL    Urobilinogen, Urine 0.2 <2.0 E.U./dL    Nitrite, Urine Negative Negative    Leukocyte Esterase, Urine Negative Negative    WBC, UA NONE 0 - 5 /HPF    RBC, UA NONE 0 - 2 /HPF    Bacteria, UA NONE SEEN None Seen /HPF   AST   Result Value Ref Range    AST 54 (H) 0 - 39 U/L   ALT   Result Value Ref Range    ALT 43 (H) 0 - 40 U/L   Bilirubin, total and direct   Result Value Ref Range    Total Bilirubin 0.3 0.0 - 1.2 mg/dL    Bilirubin, Direct 0.2 0.0 - 0.3 mg/dL    Bilirubin, Indirect 0.1 0.0 - 1.0 mg/dL   Alkaline phosphatase   Result Value Ref Range    Alkaline Phosphatase 41 40 - 129 U/L   POCT Glucose   Result Value Ref Range    Meter Glucose 57 (L) 74 - 99 mg/dL   POCT glucose   Result Value Ref Range    Glucose 57 mg/dL    QC OK? yes    POCT Glucose   Result Value Ref Range    Meter Glucose 57 (L) 74 - 99 mg/dL   POCT Glucose   Result Value Ref Range    Meter Glucose 53 (L) 74 - 99 mg/dL   POCT Glucose   Result Value Ref Range    Meter Glucose 61 (L) 74 - 99 mg/dL   POCT Glucose   Result Value Ref Range    Meter Glucose 80 74 - 99 mg/dL   POCT Glucose   Result Value Ref Range    Meter Glucose <40 (L) 74 - 99 mg/dL   POCT Glucose   Result Value Ref Range    Meter Glucose 41 (L) 74 - 99 mg/dL   ,       RADIOLOGY:  Interpreted by Radiologist unless otherwise specified  No orders to display         ------------------------- NURSING NOTES AND VITALS REVIEWED ---------------------------   The nursing notes within the ED encounter and vital signs as below have been reviewed by myself  /75   Pulse 72   Temp 98.5 °F (36.9 °C)   Resp 16   Ht 5' 10\" (1.778 m)   Wt 228 lb (103.4 kg)   SpO2 99%   BMI 32.71 kg/m²     Oxygen Saturation Interpretation: Normal    The patients available past medical records and past encounters were reviewed. ------------------------------ ED COURSE/MEDICAL DECISION MAKING----------------------  Medications   dextrose 50 % IV solution (25 g IntraVENous Given 12/11/21 2004)   0.9 % sodium chloride bolus (0 mLs IntraVENous Stopped 12/11/21 2118)   octreotide (SANDOSTATIN) 500 mcg in sodium chloride 0.9 % 100 mL infusion (25 mcg/hr IntraVENous Not Given 12/11/21 1739)        Re-Evaluations:        This patient's ED course included:a personal history and physicial examination, re-evaluation prior to disposition, multiple bedside re-evaluations, cardiac monitoring, continuous pulse oximetry and complex medical decision making and emergency management    This patient has remained hemodynamically stable during their ED course. Consultations:  Internal Medicine  Toxicology    Medical Decision Making:   Patient presents with intermittent hypoglycemia. He is on multiple diabetic agents. Vital signs interpreted by myself as normal.    History and physical examination findings consistent with multiple differential diagnosis considered including patient being on too much diabetic medication with his newfound weight loss as well as his decreased p.o. intake. Additionally, the patient could potentially have an infectious etiology. However, his physical exam is negative at this time. We will initiate a work-up for this. He is given D50 via IV as well as a p.o. challenge. I suspect most likely the patient is on too much diabetic medication after his weight loss as well as his decreased intake and recent illness. Labs: Patient is intermittently and persistently hypoglycemic despite multiple p.o. challenges. Lab studies are reviewed no leukocytosis he is hyperglycemic on his basic metabolic panel. Labs are reviewed and unremarkable for any obvious etiology of the patient's persistent hypoglycemia. Likely etiology here is this patient's weight loss complex with the fact that he is on glimepiride and Metformin. I actually discussed this case with toxicology Dr. Brendan Hodges at the [facility] to ascertain whether or not they would recommend octreotide in this case versus serial CBGs.   They did recommend administering octreotide, they recommend avoiding administration of dextrose as this will accentuate insulin release worsening the patient's hypoglycemia, the patient will need serial glucose monitoring as well as octreotide we do not have this here as this is a stand-alone emergency department. Additionally, the patient being on a beta-blocker will also cause him to be intermittently hypoglycemic in delay him returning to normal baseline. The patient will be transferred as a direct admission to [facility].    Counseling: The emergency provider has spoken with the patient and discussed todays results, in addition to providing specific details for the plan of care and counseling regarding the diagnosis and prognosis. Questions are answered at this time and they are agreeable with the plan.     --------------------------------- IMPRESSION AND DISPOSITION ---------------------------------    IMPRESSION  1. Hypoglycemia secondary to sulfonylurea, accidental or unintentional, initial encounter        DISPOSITION  Disposition: Admit to med/surg floor  Patient condition is stable    IDr. Frank, am the primary provider of record    Frank Richard DO  Emergency Medicine    NOTE: This report was transcribed using voice recognition software.  Every effort was made to ensure accuracy; however, inadvertent computerized transcription errors may be present         Gosia Carrion DO  12/11/21 6141

## 2021-12-12 LAB
ANION GAP SERPL CALCULATED.3IONS-SCNC: 10 MMOL/L (ref 7–16)
BASOPHILS ABSOLUTE: 0.03 E9/L (ref 0–0.2)
BASOPHILS RELATIVE PERCENT: 0.6 % (ref 0–2)
BUN BLDV-MCNC: 7 MG/DL (ref 6–23)
CALCIUM SERPL-MCNC: 8.9 MG/DL (ref 8.6–10.2)
CHLORIDE BLD-SCNC: 101 MMOL/L (ref 98–107)
CO2: 25 MMOL/L (ref 22–29)
CREAT SERPL-MCNC: 1 MG/DL (ref 0.7–1.2)
EOSINOPHILS ABSOLUTE: 0.2 E9/L (ref 0.05–0.5)
EOSINOPHILS RELATIVE PERCENT: 4.1 % (ref 0–6)
FERRITIN: 123 NG/ML
FOLATE: 6.7 NG/ML (ref 4.8–24.2)
GFR AFRICAN AMERICAN: >60
GFR NON-AFRICAN AMERICAN: >60 ML/MIN/1.73
GLUCOSE BLD-MCNC: 78 MG/DL (ref 74–99)
HBA1C MFR BLD: 5.4 % (ref 4–5.6)
HCT VFR BLD CALC: 30.7 % (ref 37–54)
HEMOGLOBIN: 9.2 G/DL (ref 12.5–16.5)
IMMATURE GRANULOCYTES #: 0.03 E9/L
IMMATURE GRANULOCYTES %: 0.6 % (ref 0–5)
IRON SATURATION: 21 % (ref 20–55)
IRON: 48 MCG/DL (ref 59–158)
LYMPHOCYTES ABSOLUTE: 0.95 E9/L (ref 1.5–4)
LYMPHOCYTES RELATIVE PERCENT: 19.6 % (ref 20–42)
MCH RBC QN AUTO: 22.4 PG (ref 26–35)
MCHC RBC AUTO-ENTMCNC: 30 % (ref 32–34.5)
MCV RBC AUTO: 74.9 FL (ref 80–99.9)
METER GLUCOSE: 100 MG/DL (ref 74–99)
METER GLUCOSE: 125 MG/DL (ref 74–99)
METER GLUCOSE: 203 MG/DL (ref 74–99)
METER GLUCOSE: 235 MG/DL (ref 74–99)
METER GLUCOSE: 278 MG/DL (ref 74–99)
METER GLUCOSE: 41 MG/DL (ref 74–99)
METER GLUCOSE: 49 MG/DL (ref 74–99)
METER GLUCOSE: 53 MG/DL (ref 74–99)
METER GLUCOSE: 84 MG/DL (ref 74–99)
METER GLUCOSE: 88 MG/DL (ref 74–99)
METER GLUCOSE: <40 MG/DL (ref 74–99)
METER GLUCOSE: <40 MG/DL (ref 74–99)
MONOCYTES ABSOLUTE: 0.83 E9/L (ref 0.1–0.95)
MONOCYTES RELATIVE PERCENT: 17.1 % (ref 2–12)
NEUTROPHILS ABSOLUTE: 2.8 E9/L (ref 1.8–7.3)
NEUTROPHILS RELATIVE PERCENT: 58 % (ref 43–80)
PDW BLD-RTO: 15.6 FL (ref 11.5–15)
PLATELET # BLD: 303 E9/L (ref 130–450)
PMV BLD AUTO: 9.6 FL (ref 7–12)
POTASSIUM REFLEX MAGNESIUM: 4.5 MMOL/L (ref 3.5–5)
RBC # BLD: 4.1 E12/L (ref 3.8–5.8)
SODIUM BLD-SCNC: 136 MMOL/L (ref 132–146)
TOTAL IRON BINDING CAPACITY: 231 MCG/DL (ref 250–450)
VITAMIN B-12: 705 PG/ML (ref 211–946)
WBC # BLD: 4.8 E9/L (ref 4.5–11.5)

## 2021-12-12 PROCEDURE — G0378 HOSPITAL OBSERVATION PER HR: HCPCS

## 2021-12-12 PROCEDURE — 83540 ASSAY OF IRON: CPT

## 2021-12-12 PROCEDURE — 2580000003 HC RX 258: Performed by: FAMILY MEDICINE

## 2021-12-12 PROCEDURE — 82746 ASSAY OF FOLIC ACID SERUM: CPT

## 2021-12-12 PROCEDURE — 6370000000 HC RX 637 (ALT 250 FOR IP): Performed by: FAMILY MEDICINE

## 2021-12-12 PROCEDURE — 82607 VITAMIN B-12: CPT

## 2021-12-12 PROCEDURE — 82962 GLUCOSE BLOOD TEST: CPT

## 2021-12-12 PROCEDURE — 1200000000 HC SEMI PRIVATE

## 2021-12-12 PROCEDURE — 36415 COLL VENOUS BLD VENIPUNCTURE: CPT

## 2021-12-12 PROCEDURE — 83036 HEMOGLOBIN GLYCOSYLATED A1C: CPT

## 2021-12-12 PROCEDURE — 6360000002 HC RX W HCPCS: Performed by: FAMILY MEDICINE

## 2021-12-12 PROCEDURE — 97530 THERAPEUTIC ACTIVITIES: CPT

## 2021-12-12 PROCEDURE — 82728 ASSAY OF FERRITIN: CPT

## 2021-12-12 PROCEDURE — 2500000003 HC RX 250 WO HCPCS: Performed by: FAMILY MEDICINE

## 2021-12-12 PROCEDURE — 80048 BASIC METABOLIC PNL TOTAL CA: CPT

## 2021-12-12 PROCEDURE — 97165 OT EVAL LOW COMPLEX 30 MIN: CPT

## 2021-12-12 PROCEDURE — 83550 IRON BINDING TEST: CPT

## 2021-12-12 PROCEDURE — 85025 COMPLETE CBC W/AUTO DIFF WBC: CPT

## 2021-12-12 RX ORDER — ONDANSETRON 2 MG/ML
4 INJECTION INTRAMUSCULAR; INTRAVENOUS EVERY 6 HOURS PRN
Status: DISCONTINUED | OUTPATIENT
Start: 2021-12-12 | End: 2021-12-15 | Stop reason: HOSPADM

## 2021-12-12 RX ORDER — ASPIRIN 81 MG/1
81 TABLET ORAL DAILY
Status: DISCONTINUED | OUTPATIENT
Start: 2021-12-12 | End: 2021-12-15 | Stop reason: HOSPADM

## 2021-12-12 RX ORDER — BENAZEPRIL/HYDROCHLOROTHIAZIDE 20 MG-25MG
1 TABLET ORAL DAILY
Status: DISCONTINUED | OUTPATIENT
Start: 2021-12-12 | End: 2021-12-12 | Stop reason: CLARIF

## 2021-12-12 RX ORDER — SODIUM CHLORIDE 0.9 % (FLUSH) 0.9 %
5-40 SYRINGE (ML) INJECTION PRN
Status: DISCONTINUED | OUTPATIENT
Start: 2021-12-12 | End: 2021-12-15 | Stop reason: HOSPADM

## 2021-12-12 RX ORDER — PANTOPRAZOLE SODIUM 40 MG/1
40 TABLET, DELAYED RELEASE ORAL
Status: DISCONTINUED | OUTPATIENT
Start: 2021-12-12 | End: 2021-12-15 | Stop reason: HOSPADM

## 2021-12-12 RX ORDER — SODIUM CHLORIDE 0.9 % (FLUSH) 0.9 %
5-40 SYRINGE (ML) INJECTION EVERY 12 HOURS SCHEDULED
Status: DISCONTINUED | OUTPATIENT
Start: 2021-12-12 | End: 2021-12-15 | Stop reason: HOSPADM

## 2021-12-12 RX ORDER — DEXTROSE MONOHYDRATE 50 MG/ML
100 INJECTION, SOLUTION INTRAVENOUS PRN
Status: DISCONTINUED | OUTPATIENT
Start: 2021-12-12 | End: 2021-12-15 | Stop reason: HOSPADM

## 2021-12-12 RX ORDER — ACETAMINOPHEN 650 MG/1
650 SUPPOSITORY RECTAL EVERY 6 HOURS PRN
Status: DISCONTINUED | OUTPATIENT
Start: 2021-12-12 | End: 2021-12-15 | Stop reason: HOSPADM

## 2021-12-12 RX ORDER — AMOXICILLIN 250 MG/1
1000 CAPSULE ORAL 2 TIMES DAILY
Status: DISCONTINUED | OUTPATIENT
Start: 2021-12-12 | End: 2021-12-15 | Stop reason: HOSPADM

## 2021-12-12 RX ORDER — NICOTINE POLACRILEX 4 MG
15 LOZENGE BUCCAL PRN
Status: DISCONTINUED | OUTPATIENT
Start: 2021-12-12 | End: 2021-12-15 | Stop reason: HOSPADM

## 2021-12-12 RX ORDER — SUCRALFATE 1 G/1
1 TABLET ORAL 4 TIMES DAILY
Status: DISCONTINUED | OUTPATIENT
Start: 2021-12-12 | End: 2021-12-15 | Stop reason: HOSPADM

## 2021-12-12 RX ORDER — MAGNESIUM HYDROXIDE/ALUMINUM HYDROXICE/SIMETHICONE 120; 1200; 1200 MG/30ML; MG/30ML; MG/30ML
30 SUSPENSION ORAL EVERY 6 HOURS PRN
Status: DISCONTINUED | OUTPATIENT
Start: 2021-12-12 | End: 2021-12-15 | Stop reason: HOSPADM

## 2021-12-12 RX ORDER — CLARITHROMYCIN 250 MG/1
500 TABLET, FILM COATED ORAL 2 TIMES DAILY
Status: DISCONTINUED | OUTPATIENT
Start: 2021-12-12 | End: 2021-12-15 | Stop reason: HOSPADM

## 2021-12-12 RX ORDER — LISINOPRIL 20 MG/1
20 TABLET ORAL DAILY
Status: DISCONTINUED | OUTPATIENT
Start: 2021-12-12 | End: 2021-12-15 | Stop reason: HOSPADM

## 2021-12-12 RX ORDER — ONDANSETRON 4 MG/1
4 TABLET, ORALLY DISINTEGRATING ORAL EVERY 8 HOURS PRN
Status: DISCONTINUED | OUTPATIENT
Start: 2021-12-12 | End: 2021-12-15 | Stop reason: HOSPADM

## 2021-12-12 RX ORDER — AMLODIPINE BESYLATE 10 MG/1
10 TABLET ORAL DAILY
Status: DISCONTINUED | OUTPATIENT
Start: 2021-12-12 | End: 2021-12-15 | Stop reason: HOSPADM

## 2021-12-12 RX ORDER — POLYETHYLENE GLYCOL 3350 17 G/17G
17 POWDER, FOR SOLUTION ORAL DAILY PRN
Status: DISCONTINUED | OUTPATIENT
Start: 2021-12-12 | End: 2021-12-15 | Stop reason: HOSPADM

## 2021-12-12 RX ORDER — DEXTROSE MONOHYDRATE 25 G/50ML
12.5 INJECTION, SOLUTION INTRAVENOUS PRN
Status: DISCONTINUED | OUTPATIENT
Start: 2021-12-12 | End: 2021-12-15 | Stop reason: HOSPADM

## 2021-12-12 RX ORDER — HYDROCHLOROTHIAZIDE 25 MG/1
25 TABLET ORAL DAILY
Status: DISCONTINUED | OUTPATIENT
Start: 2021-12-12 | End: 2021-12-15 | Stop reason: HOSPADM

## 2021-12-12 RX ORDER — ACETAMINOPHEN 325 MG/1
650 TABLET ORAL EVERY 6 HOURS PRN
Status: DISCONTINUED | OUTPATIENT
Start: 2021-12-12 | End: 2021-12-15 | Stop reason: HOSPADM

## 2021-12-12 RX ORDER — SODIUM CHLORIDE 9 MG/ML
25 INJECTION, SOLUTION INTRAVENOUS PRN
Status: DISCONTINUED | OUTPATIENT
Start: 2021-12-12 | End: 2021-12-15 | Stop reason: HOSPADM

## 2021-12-12 RX ORDER — PRAVASTATIN SODIUM 20 MG
40 TABLET ORAL DAILY
Status: DISCONTINUED | OUTPATIENT
Start: 2021-12-12 | End: 2021-12-15 | Stop reason: HOSPADM

## 2021-12-12 RX ADMIN — PRAVASTATIN SODIUM 40 MG: 20 TABLET ORAL at 09:05

## 2021-12-12 RX ADMIN — AMOXICILLIN 1000 MG: 250 CAPSULE ORAL at 22:58

## 2021-12-12 RX ADMIN — CLARITHROMYCIN 500 MG: 250 TABLET ORAL at 02:01

## 2021-12-12 RX ADMIN — OCTREOTIDE ACETATE 25 MCG/HR: 500 INJECTION, SOLUTION INTRAVENOUS; SUBCUTANEOUS at 21:03

## 2021-12-12 RX ADMIN — ENOXAPARIN SODIUM 40 MG: 100 INJECTION SUBCUTANEOUS at 09:04

## 2021-12-12 RX ADMIN — DEXTROSE MONOHYDRATE 100 ML/HR: 50 INJECTION, SOLUTION INTRAVENOUS at 02:19

## 2021-12-12 RX ADMIN — CLARITHROMYCIN 500 MG: 250 TABLET ORAL at 09:05

## 2021-12-12 RX ADMIN — DEXTROSE MONOHYDRATE 100 ML/HR: 50 INJECTION, SOLUTION INTRAVENOUS at 16:11

## 2021-12-12 RX ADMIN — AMLODIPINE BESYLATE 10 MG: 10 TABLET ORAL at 09:04

## 2021-12-12 RX ADMIN — SUCRALFATE 1 G: 1 TABLET ORAL at 09:04

## 2021-12-12 RX ADMIN — AMOXICILLIN 1000 MG: 250 CAPSULE ORAL at 09:04

## 2021-12-12 RX ADMIN — AMOXICILLIN 1000 MG: 250 CAPSULE ORAL at 02:01

## 2021-12-12 RX ADMIN — HYDROCHLOROTHIAZIDE 25 MG: 25 TABLET ORAL at 09:05

## 2021-12-12 RX ADMIN — Medication 15 G: at 01:55

## 2021-12-12 RX ADMIN — SUCRALFATE 1 G: 1 TABLET ORAL at 16:38

## 2021-12-12 RX ADMIN — INSULIN LISPRO 1 UNITS: 100 INJECTION, SOLUTION INTRAVENOUS; SUBCUTANEOUS at 21:06

## 2021-12-12 RX ADMIN — PANTOPRAZOLE SODIUM 40 MG: 40 TABLET, DELAYED RELEASE ORAL at 05:27

## 2021-12-12 RX ADMIN — LISINOPRIL 20 MG: 20 TABLET ORAL at 09:05

## 2021-12-12 RX ADMIN — CLARITHROMYCIN 500 MG: 250 TABLET ORAL at 21:00

## 2021-12-12 RX ADMIN — PANTOPRAZOLE SODIUM 40 MG: 40 TABLET, DELAYED RELEASE ORAL at 16:38

## 2021-12-12 RX ADMIN — SUCRALFATE 1 G: 1 TABLET ORAL at 21:00

## 2021-12-12 RX ADMIN — SODIUM CHLORIDE, PRESERVATIVE FREE 10 ML: 5 INJECTION INTRAVENOUS at 21:00

## 2021-12-12 RX ADMIN — ASPIRIN 81 MG: 81 TABLET, COATED ORAL at 09:04

## 2021-12-12 RX ADMIN — INSULIN LISPRO 2 UNITS: 100 INJECTION, SOLUTION INTRAVENOUS; SUBCUTANEOUS at 11:58

## 2021-12-12 RX ADMIN — OCTREOTIDE ACETATE 25 MCG/HR: 500 INJECTION, SOLUTION INTRAVENOUS; SUBCUTANEOUS at 04:27

## 2021-12-12 RX ADMIN — SUCRALFATE 1 G: 1 TABLET ORAL at 11:58

## 2021-12-12 RX ADMIN — DEXTROSE MONOHYDRATE 12.5 G: 25 INJECTION, SOLUTION INTRAVENOUS at 05:38

## 2021-12-12 RX ADMIN — INSULIN LISPRO 3 UNITS: 100 INJECTION, SOLUTION INTRAVENOUS; SUBCUTANEOUS at 16:38

## 2021-12-12 ASSESSMENT — PAIN SCALES - GENERAL
PAINLEVEL_OUTOF10: 0

## 2021-12-12 NOTE — PROGRESS NOTES
2222 Patient admit to unit form [place] ED. Dx Hypoglycemia. Patient alert x 4, denies H/A, dizziness, weakness, and pain. Accucheck 41 Dextrose 25 g adm via IV. Patient oriented  to room and call light, instructed to call nurse for assist to BR. Blood sugar rechecked and 53. MD paged for admission orders and to inform of blood sugar. Remains asymptomatic. Snacks provided. Seen by MD. Will monitor.

## 2021-12-12 NOTE — PROGRESS NOTES
Occupational Therapy  OCCUPATIONAL THERAPY INITIAL EVALUATION     [facility] [address] [address] [address]      ZEBF:                                                Patient Name: Alfredo Dougherty  MRN: [id number]  : 1940  Room: [address] [place] [address] #[id number]    Referring Provider: Zay Fish MD  Specific Provider Orders/Date: OT eval and treat 21    Diagnosis: Hypoglycemia [E16.2]   Pt admitted to hospital on 21 for hypoglycemia  Recent ex lap on 21    Pertinent Medical History:  has a past medical history of Diabetes mellitus ([facility]        Precautions:  Fall Risk, abdominal (ex lap )    Assessment of current deficits   [x] Functional mobility  [x]ADLs  [x] Strength               []Cognition    [x] Functional transfers   [x] IADLs         [x] Safety Awareness   [x]Endurance    [] Fine Coordination              [x] Balance      [] Vision/perception   []Sensation     []Gross Motor Coordination  [] ROM  [] Delirium                   [] Motor Control     OT PLAN OF CARE   OT POC based on physician orders, patient diagnosis and results of clinical assessment    Frequency/Duration 1-3 days/wk for 2 weeks PRN   Specific OT Treatment Interventions to include:   * Instruction/training on adapted ADL techniques and AE recommendations to increase functional independence within precautions       * Training on energy conservation strategies, correct breathing pattern and techniques to improve independence/tolerance for self-care routine  * Functional transfer/mobility training/DME recommendations for increased independence, safety, and fall prevention  * Patient/Family education to increase follow through with safety techniques and functional independence  * Recommendation of environmental modifications for increased safety with functional transfers/mobility and ADLs  * Therapeutic exercise to improve motor endurance, ROM, and functional strength for ADLs/functional transfers  * Therapeutic activities to facilitate/challenge dynamic balance, stand tolerance for increased safety and independence with ADLs  * Therapeutic activities to facilitate gross/fine motor skills for increased independence with ADLs    Recommended Adaptive Equipment: TBD     Home Living: Pt lives alone in 1st floor [address], 0 handrail    Bathroom setup: tub/shower    Equipment owned: w/w, SPC    Prior Level of Function: independent/mod I with ADLs , independent/mod I with IADLs; ambulated w/ SPC PRN   Driving: yes    Pain Level: Pt c/o no pain this session    Cognition: A&O: 4/4; Follows 1 step directions   Memory:  good    Sequencing:  good    Problem solving:  fair    Judgement/safety:  fair      Functional Assessment:  AM-PAC Daily Activity Raw Score: 19/24   Initial Eval Status  Date: 12/12/21 Treatment Status  Date: STGs = LTGs  Time frame: 10-14 days   Feeding Independent      Grooming Stand by Assist   Standing w/ w/w  Modified [place]    UB Dressing Stand by Assist   Modified [place]    LB Dressing Minimal Assist   B socks  Modified [place]    Bathing Minimal Assist  Simulated  Modified [place]    Toileting Minimal Assist   Modified [place]    Bed Mobility  Supine to sit: Stand by Assist   Sit to supine: Stand by Assist   Supine to sit: Modified [place]   Sit to supine: Modified [place]    Functional Transfers Stand by Assist   Modified [place]    Functional Mobility Minimal Assist w/ SPC  Stand by Assist w/ w/w  Functional distance in room and hallway  Modified [place]    Balance Sitting:     Static:  Supervision    Dynamic:SBA  Standing: Min A w/ SPC  SBA w/ w/w     Activity Tolerance Fair  Good   Visual/  Perceptual Glasses: yes                  Hand Dominance R   AROM (PROM) Strength Additional Info:    RUE  WFL WFL good  and wfl FMC/dexterity noted during ADL tasks       FAHAD [facility] WFL  Noted during ADL's and mobility good  and wfl FMC/dexterity noted during ADL tasks       Hearing: WFL   Sensation:  No c/o numbness or tingling   Tone: WFL   Edema: B ankles/feet (chronic)    Comments: Upon arrival patient lying in bed. Pt agreeable to OT session this date. Therapist educated pt on role of OT. At end of session, patient lying in bed (per pt request) with call light and phone within reach, all lines and tubes intact. Overall patient demonstrated decreased independence and safety during completion of ADL/functional transfer/mobility tasks. Pt would benefit from continued skilled OT to increase safety and independence with completion of ADL/IADL tasks for functional independence and quality of life. Treatment: OT treatment provided this date includes: Facilitation of bed mobility (education/cues for body mechanics-logroll to maintain abdominal precautions), unsupported sitting balance (addressing posture, weight shifting, dynamic reaching to prep for ADL's), functional transfers (various surfaces w/ education/cues for safety/hand placement), standing tolerance tasks (addressing posture, balance and activity tolerance while incorporating light functional reaching impacting ADLs and functional activity) and functional ambulation tasks (initially facilitated functional ambulation task w/ SPC - pt unsteady, mild LOB x1 w/ assist and cues to correct. Incorporated w/w for remainder of ambulation w/ education/cuing on posture, w/w management  and safety. Balance and safety improved. Reinforced benefits of w/w, pt verbalized good understanding). Therapist facilitated self-care retraining: LB self-care tasks (socks) and standing grooming tasks while educating pt on modified techniques, posture, safety and energy conservation techniques. Skilled monitoring of HR, O2 sats and pts response to treatment. No c/o dizziness at rest or with activity.     Rehab Potential: Good for established goals Patient / Family Goal: to return home      Patient and/or family were instructed on functional diagnosis, prognosis/goals and OT plan of care. Demonstrated good understanding. Eval Complexity: Low    Time In: 08:09  Time Out: 08:31  Total Treatment Time: 10 minutes    Min Units   OT Eval Low 97165  X  1   OT Eval Medium 91248      OT Eval High 34880      OT Re-Eval [id number]       Therapeutic Ex 22148       Therapeutic Activities 34961  10  1   ADL/Self Care 24781       Orthotic Management 41755       Manual 36066     Neuro Re-Ed 98276       Non-Billable Time          Evaluation Time additionally includes thorough review of current medical information, gathering information on past medical history/social history and prior level of function, interpretation of standardized testing/informal observation of tasks, assessment of data and development of plan of care and goals.         Bella OTR/L #[id number]

## 2021-12-12 NOTE — PROGRESS NOTES
Hospitalist Progress Note      PCP: Zulma Busby MD, MD    Date of Admission: 12/11/2021    Chief Complaint: Hypoglycemia     Hospital Course: 80 y.o. male with past medical history of DM and GERD . Patient is a transfer from [facility]. He presents to the ED due to hypoglycemia . He states that he has had hypoglycemia twice in 3 days and each time EMS was called and he received julisa crackers and sugar improved . Patients states that he felt no dizziness  He states that his daughter stated that he looked confused  Patient states he checks his sugars regularly and today it was low at 51 . He states that his sugar decreased to 21 and then he called EMS He reports reports no  Nausea or  abdominal  pain . Patient takes Metformin and Amaryl . His last dose of Amaryl was 9 pm 12/10  He reported no shortness of breath fever chills or chest pain . Patient is s/p laparoscopic julisa patch of perforated duodenal ulcer  on 11/18 /2021 . Patient states that since the surgery he has lost 20lbs   Patient was placed on PPI and amoxicillin an clarithromycin for H. Pylori . In the ED patient was hemodynamically stable Lab data reveal glucose 39 sodium 133 creatinine 1.0 magnesium 2.2 WBC 4.8 HGB 9.1  UA no signs of infection . Patient received D50 IV as well as PO challenge and he remained hypoglycemic despite multiple PO challenges . Poison control at the [facility] center was contacted and they recommended avoiding dextrose and giving octreotide .  Patient was transferred for serial glucose monitoring and octreotide     Subjective: Patient reports no chest pain fever chills Patient is on octreotide drip and blood sugar is 88 this am .       Medications:  Reviewed    Infusion Medications    sodium chloride      dextrose 100 mL/hr (12/12/21 2461)    octreotide (SANDOSTATIN) infusion 25 mcg/hr (12/12/21 3451)     Scheduled Medications    amLODIPine  10 mg Oral Daily    amoxicillin  1,000 mg Oral BID  aspirin  81 mg Oral Daily    clarithromycin  500 mg Oral BID    pravastatin  40 mg Oral Daily    sucralfate  1 g Oral 4x Daily    pantoprazole  40 mg Oral BID AC    sodium chloride flush  5-40 mL IntraVENous 2 times per day    enoxaparin  40 mg SubCUTAneous Daily    insulin lispro  0-6 Units SubCUTAneous TID WC    insulin lispro  0-3 Units SubCUTAneous Nightly    lisinopril  20 mg Oral Daily    And    hydroCHLOROthiazide  25 mg Oral Daily    sodium chloride  1,000 mL IntraVENous Once     PRN Meds: sodium chloride flush, sodium chloride, ondansetron **OR** ondansetron, polyethylene glycol, acetaminophen **OR** acetaminophen, aluminum & magnesium hydroxide-simethicone, glucose, dextrose, glucagon (rDNA), dextrose      Intake/Output Summary (Last 24 hours) at 12/12/2021 0853  Last data filed at 12/12/2021 0438  Gross per 24 hour   Intake 480 ml   Output 700 ml   Net -220 ml       Exam:    BP (!) 144/68   Pulse 78   Temp 98.2 °F (36.8 °C) (Temporal)   Resp 18   Ht 5' 10\" (1.778 m)   Wt 228 lb (103.4 kg)   SpO2 96%   BMI 32.71 kg/m²     General appearance: No apparent distress, appears stated age and cooperative. HEENT: Pupils equal, round, and reactive to light. Conjunctivae/corneas clear. Neck: Supple, with full range of motion. No jugular venous distention. Trachea midline. Respiratory:  Normal respiratory effort. Clear to auscultation, bilaterally without Rales/Wheezes/Rhonchi. Cardiovascular: Regular rate and rhythm with normal S1/S2 without murmurs, rubs or gallops. Abdomen: Soft, non-tender, non-distended with normal bowel sounds. Musculoskeletal: No clubbing, cyanosis or edema bilaterally. Full range of motion without deformity. Skin: Skin color, texture, turgor normal.  No rashes or lesions. Neurologic:  Neurovascularly intact without any focal sensory/motor deficits.  Cranial nerves: II-XII intact, grossly non-focal.  Psychiatric: Alert and oriented, thought content appropriate, normal insight    Labs:   Recent Labs     12/11/21  1500 12/12/21  0652   WBC 4.8 4.8   HGB 9.1* 9.2*   HCT 29.6* 30.7*    303     Recent Labs     12/11/21  1500 12/12/21  0652    136   K 4.6 4.5   CL 98 101   CO2 25 25   BUN 10 7   CREATININE 1.0 1.0   CALCIUM 9.1 8.9     Recent Labs     12/11/21  1624   AST 54*   ALT 43*   BILIDIR 0.2   BILITOT 0.3   ALKPHOS 41     No results for input(s): INR in the last 72 hours. No results for input(s): Katdmitriyrdean Villanueva in the last 72 hours. Assessment/Plan:    Active Hospital Problems    Diagnosis Date Noted    Hypoglycemia [E16.2] 12/11/2021     Sulfonylurea induced  Hypoglycemia :  Patient is a transfer from [facility] . Glucose was 39 on arrival in ED . Patient remained hypoglycemic despite IV dextrose and PO challenges . Patient takes Metformin 500 mg BID and Amaryl 4 mg . Patient has had some weight loss which may explain increased sensitivity to insulin Poison control of the [facility] recommended octreotide . C/q Octreotide infusion fingerstick q4H Endocrinology consult A1c  Hold beta blockers because it can cause intermittent hypoglycemia      Type 2 Diabetes: see as above A1c ordered      History of perforated duodenal ulcer : s/p laparoscopic Jose Roberto patch 11/18/2021  H pylori positive  clarithromycin and amoxicillin x 14 days and PPI BID . C/w Protonix and Sucralfate      Unintentional weight loss:  251 lbs on 12/8 and 228 on 12/11  Unsure if that is correct  Monitor weight daily      Hypertension : C/w Norvasc  Benza and HCTZ      Microcytic Anemia : chronic iron studies ordered            DVT Prophylaxis: Lovenox   Diet: ADULT DIET;  Regular; 3 carb choices (45 gm/meal)  Code Status: Full Code      France Taylor MD

## 2021-12-12 NOTE — PROGRESS NOTES
Dr. Jovanny erickson served for admission orders. Awaiting orders. Pt resting comfortable with call light in reach and bed in lowest position.

## 2021-12-12 NOTE — H&P
Hospital Medicine History & Physical      PCP: Elver Patel MD, MD    Date of Admission: 12/11/2021    Date of Service: Pt seen/examined on 12/11/2021  and Admitted to Inpatient with expected LOS greater than two midnights due to medical therapy. Chief Complaint:  Hypoglycemia       History Of Present Illness:    80 y.o. male with past medical history of DM and GERD . Patient is a transfer from [facility]. He presents to the ED due to hypoglycemia . He states that he has had hypoglycemia twice in 3 days and each time EMS was called and he received julisa crackers and sugar improved . Patients states that he felt no dizziness  He states that his daughter stated that he looked confused  Patient states he checks his sugars regularly and today it was low at 51 . He states that his sugar decreased to 21 and then he called EMS He reports reports no  Nausea or  abdominal  pain . Patient takes Metformin and Amaryl . His last dose of Amaryl was 9 pm 12/10  He reported no shortness of breath fever chills or chest pain . Patient is s/p laparoscopic julisa patch of perforated duodenal ulcer  on 11/18 /2021 . Patient states that since the surgery he has lost 20lbs   Patient was placed on PPI and amoxicillin an clarithromycin for H. Pylori . In the ED patient was hemodynamically stable Lab data reveal glucose 39 sodium 133 creatinine 1.0 magnesium 2.2 WBC 4.8 HGB 9.1  UA no signs of infection . Patient received D50 IV as well as PO challenge and he remained hypoglycemic despite multiple PO challenges . Poison control at the [facility] center was contacted and they recommended avoiding dextrose and giving octreotide .  Patient was transferred for serial glucose monitoring and octreotide     Past Medical History:          Diagnosis Date    Diabetes mellitus [facility])        Past Surgical History:          Procedure Laterality Date    JOINT REPLACEMENT  2018    bilateral knee     LAPAROSCOPIC APPENDECTOMY N/A 11/18/2021    LAPAROSCOPIC DREAD PATCH, PERFORATED POST PYLORIC ULCER performed by Opal Fitzgerald MD at [address] [place] [address] N/A 11/18/2021    EGD DIAGNOSTIC ONLY performed by Opal Fitzgerald MD at [facility] OR       Medications Prior to Admission:      Prior to Admission medications    Medication Sig Start Date End Date Taking? Authorizing Provider   aspirin 81 MG EC tablet Take 81 mg by mouth daily   Yes Historical Provider, MD   pantoprazole (PROTONIX) 40 MG tablet Take 1 tablet by mouth 2 times daily (before meals) 12/8/21  Yes Opal Fitzgerald MD   metFORMIN (GLUCOPHAGE) 500 MG tablet Take 500 mg by mouth 2 times daily (with meals)   Yes Historical Provider, MD   glimepiride (AMARYL) 4 MG tablet Take 4 mg by mouth every morning (before breakfast)   Yes Historical Provider, MD   metoprolol succinate (TOPROL XL) 50 MG extended release tablet Take 50 mg by mouth daily    Yes Historical Provider, MD   amLODIPine (NORVASC) 10 MG tablet Take 10 mg by mouth daily   Yes Historical Provider, MD   benazepril-hydrochlorthiazide (LOTENSIN HCT) 20-25 MG per tablet Take 1 tablet by mouth daily   Yes Historical Provider, MD   pravastatin (PRAVACHOL) 40 MG tablet Take 40 mg by mouth daily   Yes Historical Provider, MD   clarithromycin (BIAXIN) 500 MG tablet Take 1 tablet by mouth 2 times daily for 14 days 12/8/21 12/22/21  Opal Fitzgerald MD   amoxicillin (AMOXIL) 500 MG capsule Take 2 capsules by mouth 2 times daily for 14 days 12/8/21 12/22/21  Opal Fitzgerald MD   sucralfate (CARAFATE) 1 GM tablet Take 1 tablet by mouth 4 times daily 11/23/21 12/23/21  Lessie Opitz, MD   NONFORMULARY doxaosin 8mg 1x pm    Historical Provider, MD       Allergies:  Patient has no known allergies. Social History:      The patient currently lives with family     TOBACCO:   reports that he quit smoking about 20 years ago.  He has never used smokeless tobacco.  ETOH: reports previous alcohol use. Family History:       Reviewed in detail and negative for DM, CAD, Cancer, CVA. Positive as follows:        Problem Relation Age of Onset    Diabetes Mother     Heart Attack Father     Cancer Brother         bile duct/liver cancer       REVIEW OF SYSTEMS:   Pertinent positives as noted in the HPI. All other systems reviewed and negative. PHYSICAL EXAM:    BP (!) 124/56   Pulse 88   Temp 97.4 °F (36.3 °C) (Oral)   Resp 18   Ht 5' 10\" (1.778 m)   Wt 228 lb (103.4 kg)   SpO2 99%   BMI 32.71 kg/m²     General appearance:  No apparent distress, appears stated age and cooperative. HEENT:  Normal cephalic, atraumatic without obvious deformity. Pupils equal, round, and reactive to light. Extra ocular muscles intact. Conjunctivae/corneas clear. Neck: Supple, with full range of motion. No jugular venous distention. Trachea midline. Respiratory:  Normal respiratory effort. Clear to auscultation, bilaterally without Rales/Wheezes/Rhonchi. Cardiovascular:  Regular rate and rhythm with normal S1/S2 without murmurs, rubs or gallops. Abdomen: Soft, non-tender, non-distended with normal bowel sounds. Musculoskeletal:  No clubbing, cyanosis or edema bilaterally. Full range of motion without deformity. Skin: Skin color, texture, turgor normal.  No rashes or lesions. Neurologic:  Neurovascularly intact without any focal sensory/motor deficits.  Cranial nerves: II-XII intact, grossly non-focal.  Psychiatric:  Alert and oriented, thought content appropriate, normal insight    CXR:  I have reviewed the CXR   EKG:  I have reviewed the EKG      Labs:     Recent Labs     12/11/21  1500   WBC 4.8   HGB 9.1*   HCT 29.6*        Recent Labs     12/11/21  1500      K 4.6   CL 98   CO2 25   BUN 10   CREATININE 1.0   CALCIUM 9.1     Recent Labs     12/11/21  1624   AST 54*   ALT 43*   BILIDIR 0.2   BILITOT 0.3   ALKPHOS 41     No results for input(s): INR in the last 72 hours. No results for input(s): Christiana Alvarez in the last 72 hours. Urinalysis:      Lab Results   Component Value Date    NITRU Negative 12/11/2021    WBCUA NONE 12/11/2021    BACTERIA NONE SEEN 12/11/2021    RBCUA NONE 12/11/2021    BLOODU Negative 12/11/2021    SPECGRAV 1.010 12/11/2021    GLUCOSEU Negative 12/11/2021         ASSESSMENT:    Active Hospital Problems    Diagnosis Date Noted    Hypoglycemia [E16.2] 12/11/2021       PLAN:     Sulfonylurea induced  Hypoglycemia :  Patient is a transfer from [facility] . Glucose was 39 on arrival in ED . Patient remained hypoglycemic despite IV dextrose and PO challenges . Patient takes Metformin 500 mg BID and Amaryl 4 mg . Patient has had some weight loss which may explain increased sensitivity to insulin Poison control of the [facility] recommended octreotide . Admit inpatient vitals telemetry Octreotide infusion fingerstick [facility] Endocrinology consult A1c  Hold beta blockers because it can cause intermittent hypoglycemia     Type 2 Diabetes: see as above A1c ordered     History of perforated duodenal ulcer : s/p laparoscopic Jose Roberto patch 11/18/2021  H pylori positive  clarithromycin and amoxicillin x 14 days and PPI BID .  C/w Protonix and Sucralfate     Unintentional weight loss:  251 lbs on 12/8 and 228 on 12/11  Unsure if that is correct  Monitor weight daily     Hypertension : C/w Norvasc  Benza and HCTZ     Microcytic Anemia : chronic iron studies ordered                  DVT Prophylaxis: Lovenox   Diet: No diet orders on file  Code Status: Prior           Dionicio Stern MD

## 2021-12-12 NOTE — PROGRESS NOTES
Dr. Johnson Self notified patients blood sugar WNL after starting d5 @100. Will start sandostatin gtt when pharmacy sends it. Inquired whether patient can be changed to Q4H blood sugars since we dont usually do Q1H blood sugars on med surg floor. Awaiting response.

## 2021-12-13 LAB
ANION GAP SERPL CALCULATED.3IONS-SCNC: 9 MMOL/L (ref 7–16)
BASOPHILS ABSOLUTE: 0.05 E9/L (ref 0–0.2)
BASOPHILS RELATIVE PERCENT: 1 % (ref 0–2)
BUN BLDV-MCNC: 9 MG/DL (ref 6–23)
CALCIUM SERPL-MCNC: 8.9 MG/DL (ref 8.6–10.2)
CHLORIDE BLD-SCNC: 101 MMOL/L (ref 98–107)
CO2: 26 MMOL/L (ref 22–29)
CREAT SERPL-MCNC: 1.1 MG/DL (ref 0.7–1.2)
EOSINOPHILS ABSOLUTE: 0.26 E9/L (ref 0.05–0.5)
EOSINOPHILS RELATIVE PERCENT: 5.4 % (ref 0–6)
GFR AFRICAN AMERICAN: >60
GFR NON-AFRICAN AMERICAN: >60 ML/MIN/1.73
GLUCOSE BLD-MCNC: 141 MG/DL (ref 74–99)
HCT VFR BLD CALC: 29.2 % (ref 37–54)
HEMOGLOBIN: 8.9 G/DL (ref 12.5–16.5)
IMMATURE GRANULOCYTES #: 0.01 E9/L
IMMATURE GRANULOCYTES %: 0.2 % (ref 0–5)
LYMPHOCYTES ABSOLUTE: 1.08 E9/L (ref 1.5–4)
LYMPHOCYTES RELATIVE PERCENT: 22.6 % (ref 20–42)
MCH RBC QN AUTO: 21.9 PG (ref 26–35)
MCHC RBC AUTO-ENTMCNC: 30.5 % (ref 32–34.5)
MCV RBC AUTO: 71.7 FL (ref 80–99.9)
METER GLUCOSE: 125 MG/DL (ref 74–99)
METER GLUCOSE: 145 MG/DL (ref 74–99)
METER GLUCOSE: 204 MG/DL (ref 74–99)
METER GLUCOSE: 255 MG/DL (ref 74–99)
METER GLUCOSE: 270 MG/DL (ref 74–99)
MONOCYTES ABSOLUTE: 0.77 E9/L (ref 0.1–0.95)
MONOCYTES RELATIVE PERCENT: 16.1 % (ref 2–12)
NEUTROPHILS ABSOLUTE: 2.61 E9/L (ref 1.8–7.3)
NEUTROPHILS RELATIVE PERCENT: 54.7 % (ref 43–80)
PDW BLD-RTO: 15.6 FL (ref 11.5–15)
PLATELET # BLD: 301 E9/L (ref 130–450)
PMV BLD AUTO: 9.4 FL (ref 7–12)
POTASSIUM REFLEX MAGNESIUM: 4.7 MMOL/L (ref 3.5–5)
RBC # BLD: 4.07 E12/L (ref 3.8–5.8)
SODIUM BLD-SCNC: 136 MMOL/L (ref 132–146)
WBC # BLD: 4.8 E9/L (ref 4.5–11.5)

## 2021-12-13 PROCEDURE — 2580000003 HC RX 258: Performed by: FAMILY MEDICINE

## 2021-12-13 PROCEDURE — 80048 BASIC METABOLIC PNL TOTAL CA: CPT

## 2021-12-13 PROCEDURE — 6360000002 HC RX W HCPCS: Performed by: FAMILY MEDICINE

## 2021-12-13 PROCEDURE — 1200000000 HC SEMI PRIVATE

## 2021-12-13 PROCEDURE — 82962 GLUCOSE BLOOD TEST: CPT

## 2021-12-13 PROCEDURE — 36415 COLL VENOUS BLD VENIPUNCTURE: CPT

## 2021-12-13 PROCEDURE — 6370000000 HC RX 637 (ALT 250 FOR IP): Performed by: FAMILY MEDICINE

## 2021-12-13 PROCEDURE — 85025 COMPLETE CBC W/AUTO DIFF WBC: CPT

## 2021-12-13 RX ADMIN — SUCRALFATE 1 G: 1 TABLET ORAL at 13:14

## 2021-12-13 RX ADMIN — AMOXICILLIN 1000 MG: 250 CAPSULE ORAL at 22:23

## 2021-12-13 RX ADMIN — INSULIN LISPRO 1 UNITS: 100 INJECTION, SOLUTION INTRAVENOUS; SUBCUTANEOUS at 09:27

## 2021-12-13 RX ADMIN — SUCRALFATE 1 G: 1 TABLET ORAL at 17:28

## 2021-12-13 RX ADMIN — PANTOPRAZOLE SODIUM 40 MG: 40 TABLET, DELAYED RELEASE ORAL at 17:28

## 2021-12-13 RX ADMIN — SODIUM CHLORIDE, PRESERVATIVE FREE 10 ML: 5 INJECTION INTRAVENOUS at 09:17

## 2021-12-13 RX ADMIN — HYDROCHLOROTHIAZIDE 25 MG: 25 TABLET ORAL at 09:18

## 2021-12-13 RX ADMIN — CLARITHROMYCIN 500 MG: 250 TABLET ORAL at 22:23

## 2021-12-13 RX ADMIN — ENOXAPARIN SODIUM 40 MG: 100 INJECTION SUBCUTANEOUS at 09:18

## 2021-12-13 RX ADMIN — INSULIN LISPRO 3 UNITS: 100 INJECTION, SOLUTION INTRAVENOUS; SUBCUTANEOUS at 13:14

## 2021-12-13 RX ADMIN — SUCRALFATE 1 G: 1 TABLET ORAL at 22:23

## 2021-12-13 RX ADMIN — AMOXICILLIN 1000 MG: 250 CAPSULE ORAL at 09:17

## 2021-12-13 RX ADMIN — INSULIN LISPRO 2 UNITS: 100 INJECTION, SOLUTION INTRAVENOUS; SUBCUTANEOUS at 22:22

## 2021-12-13 RX ADMIN — CLARITHROMYCIN 500 MG: 250 TABLET ORAL at 09:17

## 2021-12-13 RX ADMIN — LISINOPRIL 20 MG: 20 TABLET ORAL at 09:18

## 2021-12-13 RX ADMIN — INSULIN LISPRO 2 UNITS: 100 INJECTION, SOLUTION INTRAVENOUS; SUBCUTANEOUS at 18:23

## 2021-12-13 RX ADMIN — PANTOPRAZOLE SODIUM 40 MG: 40 TABLET, DELAYED RELEASE ORAL at 05:51

## 2021-12-13 RX ADMIN — PRAVASTATIN SODIUM 40 MG: 20 TABLET ORAL at 09:18

## 2021-12-13 RX ADMIN — ASPIRIN 81 MG: 81 TABLET, COATED ORAL at 09:18

## 2021-12-13 RX ADMIN — SODIUM CHLORIDE, PRESERVATIVE FREE 10 ML: 5 INJECTION INTRAVENOUS at 22:24

## 2021-12-13 RX ADMIN — AMLODIPINE BESYLATE 10 MG: 10 TABLET ORAL at 09:20

## 2021-12-13 RX ADMIN — SUCRALFATE 1 G: 1 TABLET ORAL at 09:18

## 2021-12-13 ASSESSMENT — PAIN SCALES - GENERAL
PAINLEVEL_OUTOF10: 0
PAINLEVEL_OUTOF10: 0

## 2021-12-13 NOTE — PROGRESS NOTES
Comprehensive Nutrition Assessment    Type and Reason for Visit:  Initial, Positive Nutrition Screen    Nutrition Recommendations/Plan: Continue current diet and monitor for additional nutritional needs. Nutrition Assessment:  Pt adm with hpoglycemia with hx of DM, GERD. Also hx of perforated duodenal ulcer : s/p laparoscopic Jose Roberto patch 11/18/2021  H pylori positive. Pt with unintentional wt loss-pt unsure of exact amount. Appetite is good currently- tolerating currently diet. Continue same and monitor. Malnutrition Assessment:  Malnutrition Status: At risk for malnutrition (Comment)    Context:  Acute Illness     Findings of the 6 clinical characteristics of malnutrition:  Energy Intake:  Mild decrease in energy intake (Comment)  Weight Loss:  7 - Greater than 2% over 1 week (Previous wt hx limited)     Body Fat Loss:  No significant body fat loss     Muscle Mass Loss:  1 - Mild muscle mass loss Temples (temporalis)  Fluid Accumulation:  No significant fluid accumulation     Strength:  Not Performed      Nutrition Related Findings:  A/O x 4, Intake fair - good - states he is a picky eater. Will eat better when he goes home. HbgA1c 5.4, +2 pitting edema RLE/LLE, pt unsure of amount of wt loss - happy with current wt. Possible discharge home tomorrow. Wounds:  None       Current Nutrition Therapies:    ADULT DIET; Regular    Anthropometric Measures:  · Height: 5' 10\" (177.8 cm)  · Current Body Weight: 219 lb 8 oz (99.6 kg) (12/13)   · Admission Body Weight: 228 lb (103.4 kg) (12/11)    · Usual Body Weight:  (Questionable wt hx - ? measured)     · Ideal Body Weight: 166 lbs; % Ideal Body Weight 132.2 %   · BMI: 31.5  · BMI Categories: Obese Class 1 (BMI 30.0-34. 9)       Nutrition Diagnosis:   · Inadequate oral intake related to inadequate protein-energy intake (post lap jose roberto patch procedure 11/18) as evidenced by weight loss, intake 51-75%      Nutrition Interventions:   Food and/or Nutrient Delivery:  Continue Current Diet  Nutrition Education/Counseling:  No recommendation at this time   Coordination of Nutrition Care:  Continue to monitor while inpatient    Goals:  Consume > 75% of meals       Nutrition Monitoring and Evaluation:   Behavioral-Environmental Outcomes:  None Identified   Food/Nutrient Intake Outcomes:  Food and Nutrient Intake  Physical Signs/Symptoms Outcomes:  Biochemical Data, Fluid Status or Edema, Nutrition Focused Physical Findings, Weight     Discharge Planning:    No discharge needs at this time     Electronically signed by Anamaria Oviedo RD, LD on 12/13/21 at 5:24 PM EST    Contact: ext [phone]

## 2021-12-13 NOTE — PROGRESS NOTES
Hospitalist Progress Note      PCP: Leeanne Duron MD, MD    Date of Admission: 12/11/2021    Chief Complaint: Hypoglycemia     Hospital Course: 80 y.o. male with past medical history of DM and GERD . Patient is a transfer from [facility]. He presents to the ED due to hypoglycemia . He states that he has had hypoglycemia twice in 3 days and each time EMS was called and he received julisa crackers and sugar improved . Patients states that he felt no dizziness  He states that his daughter stated that he looked confused  Patient states he checks his sugars regularly and today it was low at 51 . He states that his sugar decreased to 21 and then he called EMS He reports reports no  Nausea or  abdominal  pain . Patient takes Metformin and Amaryl . His last dose of Amaryl was 9 pm 12/10  He reported no shortness of breath fever chills or chest pain . Patient is s/p laparoscopic julisa patch of perforated duodenal ulcer  on 11/18 /2021 . Patient states that since the surgery he has lost 20lbs   Patient was placed on PPI and amoxicillin an clarithromycin for H. Pylori . In the ED patient was hemodynamically stable Lab data reveal glucose 39 sodium 133 creatinine 1.0 magnesium 2.2 WBC 4.8 HGB 9.1  UA no signs of infection . Patient received D50 IV as well as PO challenge and he remained hypoglycemic despite multiple PO challenges . Poison control at the [facility] center was contacted and they recommended avoiding dextrose and giving octreotide .  Patient was transferred for serial glucose monitoring and octreotide     Subjective: Patient reports no chest pain fever chills Patient is on octreotide drip and blood sugar is 88 this am .       Medications:  Reviewed    Infusion Medications    sodium chloride      dextrose Stopped (12/12/21 2000)    octreotide (SANDOSTATIN) infusion 25 mcg/hr (12/12/21 2103)     Scheduled Medications    amLODIPine  10 mg Oral Daily    amoxicillin  1,000 mg Oral BID  aspirin  81 mg Oral Daily    clarithromycin  500 mg Oral BID    pravastatin  40 mg Oral Daily    sucralfate  1 g Oral 4x Daily    pantoprazole  40 mg Oral BID AC    sodium chloride flush  5-40 mL IntraVENous 2 times per day    enoxaparin  40 mg SubCUTAneous Daily    insulin lispro  0-6 Units SubCUTAneous TID WC    insulin lispro  0-3 Units SubCUTAneous Nightly    lisinopril  20 mg Oral Daily    And    hydroCHLOROthiazide  25 mg Oral Daily    sodium chloride  1,000 mL IntraVENous Once     PRN Meds: sodium chloride flush, sodium chloride, ondansetron **OR** ondansetron, polyethylene glycol, acetaminophen **OR** acetaminophen, aluminum & magnesium hydroxide-simethicone, glucose, dextrose, glucagon (rDNA), dextrose      Intake/Output Summary (Last 24 hours) at 12/13/2021 0905  Last data filed at 12/13/2021 0749  Gross per 24 hour   Intake 1989.87 ml   Output 1350 ml   Net 639.87 ml       Exam:    /74   Pulse 81   Temp 97.9 °F (36.6 °C) (Oral)   Resp 18   Ht 5' 10\" (1.778 m)   Wt 219 lb 8 oz (99.6 kg)   SpO2 99%   BMI 31.49 kg/m²     General appearance: No apparent distress, appears stated age and cooperative. HEENT: Pupils equal, round, and reactive to light. Conjunctivae/corneas clear. Neck: Supple, with full range of motion. No jugular venous distention. Trachea midline. Respiratory:  Normal respiratory effort. Clear to auscultation, bilaterally without Rales/Wheezes/Rhonchi. Cardiovascular: Regular rate and rhythm with normal S1/S2 without murmurs, rubs or gallops. Abdomen: Soft, non-tender, non-distended with normal bowel sounds. Musculoskeletal: No clubbing, cyanosis or edema bilaterally. Full range of motion without deformity. Skin: Skin color, texture, turgor normal.  No rashes or lesions. Neurologic:  Neurovascularly intact without any focal sensory/motor deficits.  Cranial nerves: II-XII intact, grossly non-focal.  Psychiatric: Alert and oriented, thought content appropriate, Code      Jameson Ingram, MD

## 2021-12-13 NOTE — PLAN OF CARE
Problem: Serum Glucose Level - Abnormal:  Goal: Ability to maintain appropriate glucose levels has stabilized  Description: Ability to maintain appropriate glucose levels has stabilized  12/13/2021 1301 by Charlene Kebede RN  Outcome: Ongoing

## 2021-12-13 NOTE — PROGRESS NOTES
0808-Assessment complete and charted patient denies needs at this time safety maintained will continue to monitor

## 2021-12-13 NOTE — DISCHARGE SUMMARY
Physician Discharge Summary     Patient ID:  Mallorie Land  [id number]  20 y.o.  1940    Admit date: 12/11/2021    Discharge date and time:  12/15/2021    Discharge Diagnoses: Active Problems:    Hypoglycemia  Resolved Problems:    * No resolved hospital problems. *      Consults: IP CONSULT TO INTERNAL MEDICINE    Procedures: See below    Hospital Course: 80 y. o. male with past medical history of DM and GERD . Patient is a transfer from [facility]. He presents to the ED due to hypoglycemia . He states that he has had hypoglycemia twice in 3 days and each time EMS was called and he received julisa crackers and sugar improved . Patients states that he felt no dizziness  He states that his daughter stated that he looked confused  Patient states he checks his sugars regularly and today it was low at 51 . He states that his sugar decreased to 21 and then he called EMS He reports reports no  Nausea or  abdominal  pain . Patient takes Metformin and Amaryl . His last dose of Amaryl was 9 pm 12/10  He reported no shortness of breath fever chills or chest pain . Patient is s/p laparoscopic julisa patch of perforated duodenal ulcer  on 11/18 /2021 . Patient states that since the surgery he has lost 20lbs   Patient was placed on PPI and amoxicillin an clarithromycin for H. Pylori . In the ED patient was hemodynamically stable Lab data reveal glucose 39 sodium 133 creatinine 1.0 magnesium 2.2 WBC 4.8 HGB 9.1  UA no signs of infection . Patient received D50 IV as well as PO challenge and he remained hypoglycemic despite multiple PO challenges . Poison control at the [facility] center was contacted and they recommended avoiding dextrose and giving octreotide . Patient was transferred for serial glucose monitoring and octreotide     Sulfonylurea induced  Hypoglycemia :  Patient is a transfer from [facility] . Glucose was 39 on arrival in ED .  Patient remained hypoglycemic despite IV dextrose and PO challenges . Patient takes Metformin 500 mg BID and Amaryl 4 mg . Poison control of the [address] recommended octreotide . treated Octreotide infusion fingerstick q4H  A1c  Hold beta blockers because it can cause intermittent hypoglycemia     Type 2 Diabetes: Hemoglobin A1c 5.5 11/2021, A1c 5.4 12/20/2021--indicating too aggressive diabetes control. Amaryl should be discontinued,  blood sugars increased last 24 hours. Metformin being continued encourage diabetic control through diet. Given weight loss will place on regular diet. MBS, SSI     History of perforated duodenal ulcer : s/p laparoscopic Jose Roberto patch 11/18/2021  H pylori positive  clarithromycin and amoxicillin x 14 days and PPI BID .  C/w Protonix and Sucralfate      Unintentional weight loss:  235 lbs on 11/17 and 228 on 12/11  Unsure if that is correct  Monitor weight daily-     Hypertension : C/w Norvasc  Benza and HCTZ      Microcytic Anemia : chronic--  iron studies reviewed         Discharge Exam:  See progress note from today    Condition:  Stable    Disposition: SNF    Patient Instructions:   Current Discharge Medication List      CONTINUE these medications which have NOT CHANGED    Details   aspirin 81 MG EC tablet Take 81 mg by mouth daily      pantoprazole (PROTONIX) 40 MG tablet Take 1 tablet by mouth 2 times daily (before meals)  Qty: 60 tablet, Refills: 3      metoprolol succinate (TOPROL XL) 50 MG extended release tablet Take 50 mg by mouth daily       amLODIPine (NORVASC) 10 MG tablet Take 10 mg by mouth daily      benazepril-hydrochlorthiazide (LOTENSIN HCT) 20-25 MG per tablet Take 1 tablet by mouth daily      pravastatin (PRAVACHOL) 40 MG tablet Take 40 mg by mouth daily      clarithromycin (BIAXIN) 500 MG tablet Take 1 tablet by mouth 2 times daily for 14 days  Qty: 28 tablet, Refills: 0      amoxicillin (AMOXIL) 500 MG capsule Take 2 capsules by mouth 2 times daily for 14 days  Qty: 56 capsule, Refills: 0 sucralfate (CARAFATE) 1 GM tablet Take 1 tablet by mouth 4 times daily  Qty: 120 tablet, Refills: 0         STOP taking these medications       metFORMIN (GLUCOPHAGE) 500 MG tablet Comments:   Reason for Stopping:         NONFORMULARY Comments:   Reason for Stopping:         glimepiride (AMARYL) 4 MG tablet Comments:   Reason for Stopping:             Activity: activity as tolerated  Diet: Carb control    Follow-up with 1 week PCP    Note that over 30 minutes was spent in preparing discharge papers, discussing discharge with patient, staff, consultants, medication review, arranging follow up, etc.    Signed:  Veena Cruz MD  12/13/2021  2:34 PM

## 2021-12-14 LAB
ANION GAP SERPL CALCULATED.3IONS-SCNC: 12 MMOL/L (ref 7–16)
BASOPHILS ABSOLUTE: 0.05 E9/L (ref 0–0.2)
BASOPHILS RELATIVE PERCENT: 0.9 % (ref 0–2)
BUN BLDV-MCNC: 9 MG/DL (ref 6–23)
CALCIUM SERPL-MCNC: 8.9 MG/DL (ref 8.6–10.2)
CHLORIDE BLD-SCNC: 100 MMOL/L (ref 98–107)
CO2: 24 MMOL/L (ref 22–29)
CREAT SERPL-MCNC: 1 MG/DL (ref 0.7–1.2)
EOSINOPHILS ABSOLUTE: 0.23 E9/L (ref 0.05–0.5)
EOSINOPHILS RELATIVE PERCENT: 4 % (ref 0–6)
GFR AFRICAN AMERICAN: >60
GFR NON-AFRICAN AMERICAN: >60 ML/MIN/1.73
GLUCOSE BLD-MCNC: 141 MG/DL (ref 74–99)
HCT VFR BLD CALC: 30.9 % (ref 37–54)
HEMOGLOBIN: 9.4 G/DL (ref 12.5–16.5)
IMMATURE GRANULOCYTES #: 0.02 E9/L
IMMATURE GRANULOCYTES %: 0.3 % (ref 0–5)
LYMPHOCYTES ABSOLUTE: 1.32 E9/L (ref 1.5–4)
LYMPHOCYTES RELATIVE PERCENT: 23 % (ref 20–42)
MCH RBC QN AUTO: 21.9 PG (ref 26–35)
MCHC RBC AUTO-ENTMCNC: 30.4 % (ref 32–34.5)
MCV RBC AUTO: 71.9 FL (ref 80–99.9)
METER GLUCOSE: 145 MG/DL (ref 74–99)
METER GLUCOSE: 171 MG/DL (ref 74–99)
METER GLUCOSE: 176 MG/DL (ref 74–99)
METER GLUCOSE: 180 MG/DL (ref 74–99)
MONOCYTES ABSOLUTE: 0.92 E9/L (ref 0.1–0.95)
MONOCYTES RELATIVE PERCENT: 16 % (ref 2–12)
NEUTROPHILS ABSOLUTE: 3.21 E9/L (ref 1.8–7.3)
NEUTROPHILS RELATIVE PERCENT: 55.8 % (ref 43–80)
PDW BLD-RTO: 15.7 FL (ref 11.5–15)
PLATELET # BLD: 310 E9/L (ref 130–450)
PMV BLD AUTO: 9.5 FL (ref 7–12)
POTASSIUM REFLEX MAGNESIUM: 4.2 MMOL/L (ref 3.5–5)
RBC # BLD: 4.3 E12/L (ref 3.8–5.8)
SODIUM BLD-SCNC: 136 MMOL/L (ref 132–146)
WBC # BLD: 5.8 E9/L (ref 4.5–11.5)

## 2021-12-14 PROCEDURE — 1200000000 HC SEMI PRIVATE

## 2021-12-14 PROCEDURE — 85025 COMPLETE CBC W/AUTO DIFF WBC: CPT

## 2021-12-14 PROCEDURE — 80048 BASIC METABOLIC PNL TOTAL CA: CPT

## 2021-12-14 PROCEDURE — 36415 COLL VENOUS BLD VENIPUNCTURE: CPT

## 2021-12-14 PROCEDURE — 6360000002 HC RX W HCPCS: Performed by: FAMILY MEDICINE

## 2021-12-14 PROCEDURE — 2580000003 HC RX 258: Performed by: FAMILY MEDICINE

## 2021-12-14 PROCEDURE — 6370000000 HC RX 637 (ALT 250 FOR IP): Performed by: FAMILY MEDICINE

## 2021-12-14 PROCEDURE — 82962 GLUCOSE BLOOD TEST: CPT

## 2021-12-14 RX ADMIN — INSULIN LISPRO 1 UNITS: 100 INJECTION, SOLUTION INTRAVENOUS; SUBCUTANEOUS at 23:05

## 2021-12-14 RX ADMIN — SODIUM CHLORIDE, PRESERVATIVE FREE 10 ML: 5 INJECTION INTRAVENOUS at 09:00

## 2021-12-14 RX ADMIN — ASPIRIN 81 MG: 81 TABLET, COATED ORAL at 09:26

## 2021-12-14 RX ADMIN — SODIUM CHLORIDE, PRESERVATIVE FREE 10 ML: 5 INJECTION INTRAVENOUS at 23:17

## 2021-12-14 RX ADMIN — ENOXAPARIN SODIUM 40 MG: 100 INJECTION SUBCUTANEOUS at 09:24

## 2021-12-14 RX ADMIN — CLARITHROMYCIN 500 MG: 250 TABLET ORAL at 22:14

## 2021-12-14 RX ADMIN — LISINOPRIL 20 MG: 20 TABLET ORAL at 09:25

## 2021-12-14 RX ADMIN — PRAVASTATIN SODIUM 40 MG: 20 TABLET ORAL at 09:25

## 2021-12-14 RX ADMIN — AMOXICILLIN 1000 MG: 250 CAPSULE ORAL at 09:25

## 2021-12-14 RX ADMIN — INSULIN LISPRO 1 UNITS: 100 INJECTION, SOLUTION INTRAVENOUS; SUBCUTANEOUS at 13:47

## 2021-12-14 RX ADMIN — CLARITHROMYCIN 500 MG: 250 TABLET ORAL at 09:25

## 2021-12-14 RX ADMIN — SUCRALFATE 1 G: 1 TABLET ORAL at 13:47

## 2021-12-14 RX ADMIN — AMOXICILLIN 1000 MG: 250 CAPSULE ORAL at 22:13

## 2021-12-14 RX ADMIN — HYDROCHLOROTHIAZIDE 25 MG: 25 TABLET ORAL at 09:25

## 2021-12-14 RX ADMIN — PANTOPRAZOLE SODIUM 40 MG: 40 TABLET, DELAYED RELEASE ORAL at 17:14

## 2021-12-14 RX ADMIN — SUCRALFATE 1 G: 1 TABLET ORAL at 17:14

## 2021-12-14 RX ADMIN — AMLODIPINE BESYLATE 10 MG: 10 TABLET ORAL at 09:25

## 2021-12-14 RX ADMIN — SUCRALFATE 1 G: 1 TABLET ORAL at 09:25

## 2021-12-14 RX ADMIN — INSULIN LISPRO 1 UNITS: 100 INJECTION, SOLUTION INTRAVENOUS; SUBCUTANEOUS at 09:24

## 2021-12-14 RX ADMIN — PANTOPRAZOLE SODIUM 40 MG: 40 TABLET, DELAYED RELEASE ORAL at 06:21

## 2021-12-14 RX ADMIN — SUCRALFATE 1 G: 1 TABLET ORAL at 22:14

## 2021-12-14 ASSESSMENT — PAIN SCALES - GENERAL
PAINLEVEL_OUTOF10: 0

## 2021-12-14 NOTE — CARE COORDINATION
[facility] has accepted Pt and will submit for waiver in the morning. Called [phone] [address] [facility] to request updated therapy notes for pending pre-cert. Notified Pt and Charge Nurse that Pt was accepted at [facility] and can go Wednesday Dec. 15th when waiver for [facility] [employer] is beginning. Pt will call [facility] to inform of acceptance at [facility]. Will need negative covid day of discharge. Completed [employer] form and envelope . See soft chart. Started [facility] Exemption. Discharge Plan is to [facility] . MANUEL/CIARA to follow for discharge needs.    Kimberly Oneill, L.S.W.  [phone]

## 2021-12-14 NOTE — PROGRESS NOTES
Hospitalist Progress Note      PCP: Breanna Washington MD, MD    Date of Admission: 12/11/2021    Chief Complaint: Hypoglycemia     Hospital Course: 80 y.o. male with past medical history of DM and GERD . Patient is a transfer from [facility]. He presents to the ED due to hypoglycemia . He states that he has had hypoglycemia twice in 3 days and each time EMS was called and he received julisa crackers and sugar improved . Patients states that he felt no dizziness  He states that his daughter stated that he looked confused  Patient states he checks his sugars regularly and today it was low at 51 . He states that his sugar decreased to 21 and then he called EMS He reports reports no  Nausea or  abdominal  pain . Patient takes Metformin and Amaryl . His last dose of Amaryl was 9 pm 12/10  He reported no shortness of breath fever chills or chest pain . Patient is s/p laparoscopic julisa patch of perforated duodenal ulcer  on 11/18 /2021 . Patient states that since the surgery he has lost 20lbs   Patient was placed on PPI and amoxicillin an clarithromycin for H. Pylori . In the ED patient was hemodynamically stable Lab data reveal glucose 39 sodium 133 creatinine 1.0 magnesium 2.2 WBC 4.8 HGB 9.1  UA no signs of infection . Patient received D50 IV as well as PO challenge and he remained hypoglycemic despite multiple PO challenges . Poison control at the [facility] center was contacted and they recommended avoiding dextrose and giving octreotide .  Patient was transferred for serial glucose monitoring and octreotide     Subjective:  Feeling better without complaint  No CP or SOB  No fever or chills   No uncontrolled pain  No vomiting or diarrhea   No events reported overnight       Medications:  Reviewed    Infusion Medications    sodium chloride      dextrose Stopped (12/12/21 2000)     Scheduled Medications    amLODIPine  10 mg Oral Daily    amoxicillin  1,000 mg Oral BID    aspirin  81 mg Oral Daily    clarithromycin  500 mg Oral BID    pravastatin  40 mg Oral Daily    sucralfate  1 g Oral 4x Daily    pantoprazole  40 mg Oral BID AC    sodium chloride flush  5-40 mL IntraVENous 2 times per day    enoxaparin  40 mg SubCUTAneous Daily    insulin lispro  0-6 Units SubCUTAneous TID WC    insulin lispro  0-3 Units SubCUTAneous Nightly    lisinopril  20 mg Oral Daily    And    hydroCHLOROthiazide  25 mg Oral Daily    sodium chloride  1,000 mL IntraVENous Once     PRN Meds: sodium chloride flush, sodium chloride, ondansetron **OR** ondansetron, polyethylene glycol, acetaminophen **OR** acetaminophen, aluminum & magnesium hydroxide-simethicone, glucose, dextrose, glucagon (rDNA), dextrose      Intake/Output Summary (Last 24 hours) at 12/14/2021 1018  Last data filed at 12/14/2021 0422  Gross per 24 hour   Intake --   Output 500 ml   Net -500 ml       Exam:    BP (!) 116/56   Pulse 87   Temp 98.8 °F (37.1 °C) (Temporal)   Resp 18   Ht 5' 10\" (1.778 m)   Wt 219 lb 8 oz (99.6 kg)   SpO2 97%   BMI 31.49 kg/m²     General appearance: No apparent distress, appears stated age and cooperative. HEENT: Pupils equal, round, and reactive to light. Conjunctivae/corneas clear. Neck: Supple, with full range of motion. No jugular venous distention. Trachea midline. Respiratory:  Normal respiratory effort. Clear to auscultation, bilaterally without Rales/Wheezes/Rhonchi. Cardiovascular: Regular rate and rhythm with normal S1/S2 without murmurs, rubs or gallops. Abdomen: Soft, non-tender, non-distended with normal bowel sounds. Musculoskeletal: No clubbing, cyanosis or edema bilaterally. Full range of motion without deformity. Skin: Skin color, texture, turgor normal.  No rashes or lesions. Neurologic:  Neurovascularly intact without any focal sensory/motor deficits.  Cranial nerves: II-XII intact, grossly non-focal.  Psychiatric: Alert and oriented, thought content appropriate, normal insight    Labs: Recent Labs     12/12/21  0652 12/13/21  0545 12/14/21  0858   WBC 4.8 4.8 5.8   HGB 9.2* 8.9* 9.4*   HCT 30.7* 29.2* 30.9*    301 310     Recent Labs     12/11/21  1500 12/12/21  0652 12/13/21  0545    136 136   K 4.6 4.5 4.7   CL 98 101 101   CO2 25 25 26   BUN 10 7 9   CREATININE 1.0 1.0 1.1   CALCIUM 9.1 8.9 8.9     Recent Labs     12/11/21  1624   AST 54*   ALT 43*   BILIDIR 0.2   BILITOT 0.3   ALKPHOS 41     No results for input(s): INR in the last 72 hours. No results for input(s): Prentis Revels in the last 72 hours. Assessment/Plan:    Active Hospital Problems    Diagnosis Date Noted    Hypoglycemia [E16.2] 12/11/2021     Sulfonylurea induced  Hypoglycemia :  Patient is a transfer from [facility] . Glucose was 39 on arrival in ED . Patient remained hypoglycemic despite IV dextrose and PO challenges . Patient takes Metformin 500 mg BID and Amaryl 4 mg . Poison control of the [facility] recommended octreotide . C/q Octreotide infusion fingerstick q4H  A1c  Hold beta blockers because it can cause intermittent hypoglycemia-- cancel Endocrinology consult--     Type 2 Diabetes: Hemoglobin A1c 5.5 11/2021, A1c 5.4 12/20/2021--indicating too aggressive diabetes control. Amaryl should be discontinued,  blood sugars increased last 24 hours. Metformin being continued encourage diabetic control through diet. Given weight loss will place on regular diet. MBS, SSI     History of perforated duodenal ulcer : s/p laparoscopic Jose Roberto patch 11/18/2021  H pylori positive  clarithromycin and amoxicillin x 14 days and PPI BID . C/w Protonix and Sucralfate      Unintentional weight loss:  235 lbs on 11/17 and 228 on 12/11  Unsure if that is correct  Monitor weight daily-     Hypertension : C/w Norvasc  Benza and HCTZ      Microcytic Anemia : chronic iron studies reviewed            DVT Prophylaxis: Lovenox   Diet: ADULT DIET;  Regular  Code Status: Full Code      Ryan Mathews MD

## 2021-12-14 NOTE — PLAN OF CARE
Problem: Serum Glucose Level - Abnormal:  Goal: Ability to maintain appropriate glucose levels has stabilized  Description: Ability to maintain appropriate glucose levels has stabilized  12/14/2021 1234 by Jere Pulido RN  Outcome: Met This Shift  12/14/2021 0415 by Tosha Wu RN  Outcome: Met This Shift  12/14/2021 0414 by Tosha Wu RN  Outcome: Met This Shift  12/14/2021 0414 by Tosha Wu RN  Outcome: Met This Shift     Problem: Falls - Risk of:  Goal: Will remain free from falls  Description: Will remain free from falls  12/14/2021 1234 by Jere Pulido RN  Outcome: Met This Shift  12/14/2021 0415 by Tosha Wu RN  Outcome: Met This Shift  Goal: Absence of physical injury  Description: Absence of physical injury  12/14/2021 1234 by Jere Pulido RN  Outcome: Met This Shift  12/14/2021 0415 by Tosha Wu RN  Outcome: Met This Shift

## 2021-12-14 NOTE — PLAN OF CARE
Problem: Serum Glucose Level - Abnormal:  Goal: Ability to maintain appropriate glucose levels has stabilized  12/14/2021 0415 by Jayda Horan RN  Outcome: Met This Shift  12/14/2021 0414 by Jayda Horan RN  Outcome: Met This Shift  12/14/2021 0414 by Jayda Horan RN  Outcome: Met This Shift     Problem: Falls - Risk of:  Goal: Will remain free from falls  Outcome: Met This Shift  Goal: Absence of physical injury  Outcome: Met This Shift

## 2021-12-14 NOTE — CARE COORDINATION
Received calls [facility] [address] [facility] [place] [facility]. Advised both Dtrs to contact [facility] to speak with PASSPORT CM to adjust services and request an ERS button for PT. Will speak with Pt about not driving for for awhile until he sees the Dr, consider [place], and [facility]. Spoke with Pt about Short rehab stay , getting ERS button, consider [place] and or [facility]. Pt choose [facility] to see if he'd be accepted for short rehab stay. [facility] if not accepted to Rehab facility. Pt will accept ERS Button when at home  and will work with CM to get a different provider for [employer]. Pt will look into get rides to Dr murillo from [facility]. Referral made to [facility]. Discharge Plan is to [facility] with [facility]. SW/CIARA to follow for discharge needs.    Andrew Hernandez, L.S.W.  [phone]

## 2021-12-15 VITALS
OXYGEN SATURATION: 98 % | WEIGHT: 230.9 LBS | RESPIRATION RATE: 18 BRPM | DIASTOLIC BLOOD PRESSURE: 47 MMHG | SYSTOLIC BLOOD PRESSURE: 109 MMHG | HEART RATE: 80 BPM | BODY MASS INDEX: 33.05 KG/M2 | HEIGHT: 70 IN | TEMPERATURE: 97.7 F

## 2021-12-15 LAB
METER GLUCOSE: 106 MG/DL (ref 74–99)
SARS-COV-2, NAAT: NOT DETECTED

## 2021-12-15 PROCEDURE — 97530 THERAPEUTIC ACTIVITIES: CPT

## 2021-12-15 PROCEDURE — 6360000002 HC RX W HCPCS: Performed by: FAMILY MEDICINE

## 2021-12-15 PROCEDURE — 87635 SARS-COV-2 COVID-19 AMP PRB: CPT

## 2021-12-15 PROCEDURE — 97161 PT EVAL LOW COMPLEX 20 MIN: CPT

## 2021-12-15 PROCEDURE — 2580000003 HC RX 258: Performed by: FAMILY MEDICINE

## 2021-12-15 PROCEDURE — 82962 GLUCOSE BLOOD TEST: CPT

## 2021-12-15 PROCEDURE — 97535 SELF CARE MNGMENT TRAINING: CPT

## 2021-12-15 PROCEDURE — 6370000000 HC RX 637 (ALT 250 FOR IP): Performed by: FAMILY MEDICINE

## 2021-12-15 RX ADMIN — CLARITHROMYCIN 500 MG: 250 TABLET ORAL at 10:34

## 2021-12-15 RX ADMIN — SODIUM CHLORIDE, PRESERVATIVE FREE 10 ML: 5 INJECTION INTRAVENOUS at 10:35

## 2021-12-15 RX ADMIN — PANTOPRAZOLE SODIUM 40 MG: 40 TABLET, DELAYED RELEASE ORAL at 05:54

## 2021-12-15 RX ADMIN — SUCRALFATE 1 G: 1 TABLET ORAL at 10:34

## 2021-12-15 RX ADMIN — LISINOPRIL 20 MG: 20 TABLET ORAL at 10:33

## 2021-12-15 RX ADMIN — ASPIRIN 81 MG: 81 TABLET, COATED ORAL at 10:34

## 2021-12-15 RX ADMIN — PRAVASTATIN SODIUM 40 MG: 20 TABLET ORAL at 10:34

## 2021-12-15 RX ADMIN — ENOXAPARIN SODIUM 40 MG: 100 INJECTION SUBCUTANEOUS at 10:34

## 2021-12-15 RX ADMIN — AMLODIPINE BESYLATE 10 MG: 10 TABLET ORAL at 10:33

## 2021-12-15 RX ADMIN — HYDROCHLOROTHIAZIDE 25 MG: 25 TABLET ORAL at 10:33

## 2021-12-15 RX ADMIN — AMOXICILLIN 1000 MG: 250 CAPSULE ORAL at 10:33

## 2021-12-15 NOTE — PROGRESS NOTES
Physical Therapy  Physical Therapy Initial Assessment     Name: Neri Zarate  : 1940  MRN: [id number]      Date of Service: 12/15/2021    Evaluating PT:  Gerber Bah PT, DPT [id number]     Room #:  5937/8897-M  Diagnosis:  Hypoglycemia [E16.2]  Reason for admission: hypoglycemia   Precautions:  Falls  Procedure/Surgery:  None   Equipment Recommendations:  SPC vs FWW     SUBJECTIVE:  Pt lives alone in a 1st floor apartment with 1 KIMBERLY non rail. Pt ambulated with SPC PRN PTA. OBJECTIVE:   Initial Evaluation  Date: 12/15 Treatment   Short Term/ Long Term   Goals   AM-PAC 6 Clicks 86/10     Was pt agreeable to Eval/treatment? Yes      Does pt have pain? Denies      Bed Mobility  Rolling: NT  Supine to sit: SBA  Sit to supine: SBA  Scooting: SBA  Independent    Transfers Sit to stand: Janis  Stand to sit: Janis  Stand pivot: Janis no AD  Independent    Ambulation    75 feet with SPC Janis  And  50 ft with [employer] Janis  >200 feet with AAD Mod I    Stair negotiation: ascended and descended  NT  >1 steps with 1 rail mod I    ROM BUE:  See OT eval   BLE:  WFL     Strength BUE:  See OT eval   BLE:  knee ext 5/5  Ankle DF 5/5  Increase by 1/3 MMT grade    Balance Sitting EOB:  Supervision  Dynamic Standing:  Janis  Sitting EOB:  indep  Dynamic Standing:   Mod I      -Pt is A & O x 3  -Sensation:  Pt denies numbness and tingling to extremities  -Edema:  unremarkable     Therapeutic Exercises:  Functional activity     Patient education  Pt educated on safety, sequencing of transfers, and role of PT    Patient response to education:   Pt verbalized understanding Pt demonstrated skill Pt requires further education in this area   Yes  Partial  Yes      ASSESSMENT:  Conditions Requiring Skilled Therapeutic Intervention:  []Decreased strength     []Decreased ROM  [x]Decreased functional mobility  [x]Decreased balance   [x]Decreased endurance   []Decreased posture  []Decreased sensation  []Decreased coordination   []Decreased vision  [x]Decreased safety awareness   []Increased pain       Comments:  Pt received supine in bed and agreeable to PT session   Pt able to complete bed mobility without hands on assistance but does require slight elevation of HOB and use of handrail. Stood with light assistance from bed and chair. Ambulation completed in two bouts with use of cane and WW -- both occasions showing flexed posture, slow gait speed, and minor unsteadiness. Verbal cues for stride length and foot clearance. Returned to room and returned to bed at pt request   Pt with all needs met and call light in reach. Pt would benefit from continued PT POC to address functional deficits described above. Treatment:  Patient practiced and was instructed in the following treatment:     Patient education provided continuously throughout session for sequencing, safety maintenance, and improving any deficits found during the evaluation.  Bed mobility training - pt given verbal and tactile cues to facilitate proper sequencing and safety during rolling and supine>sit as well as provided with physical assistance to complete task     STS and pivot transfer training - pt educated on proper hand and foot placement, safety and sequencing, and use of proper technique to safely complete sit<>stand and pivot transfers with hands on assistance to complete task safely    Gait training- pt was given verbal and tactile cues to facilitate normal speed and pattern during ambulation as well as provided with physical assistance. Completed x2 bouts with varying devices    Pt's/ family goals   1. Rehab at [place]    Patient and or family understand(s) diagnosis, prognosis, and plan of care. yes    Prognosis is good for reaching above PT goals.     PHYSICAL THERAPY PLAN OF CARE:    PT POC is established based on physician order and patient diagnosis     Referring provider/PT Order:    12/15/21 0800  PT justinal and treat      Shanelle Cai MD     Diagnosis: Hypoglycemia [E16.2]  Specific instructions for next treatment:  Progress ambulation distance with appropriate device. Stairs. oob in chair    Current Treatment Recommendations:   [] Strengthening to improve independence with functional mobility   [] ROM to improve independence with functional mobility   [x] Balance Training to improve static/dynamic balance and to reduce fall risk  [x] Endurance Training to improve activity tolerance during functional mobility   [x] Transfer Training to improve safety and independence with all functional transfers   [x] Gait Training to improve gait mechanics, endurance and asses need for appropriate assistive device  [x] Stair Training in preparation for safe discharge home and/or into the community   [] Positioning to prevent skin breakdown and contractures  [x] Safety and Education Training   [] Patient/Caregiver Education   [] HEP  [] Other     PT long term treatment goals are located in above grid    Frequency of treatments: 2-5x/week x 1-2 weeks. Time in  0755  Time out  0820    Total Treatment Time  10 minutes     Evaluation Time includes thorough review of current medical information, gathering information on past medical history/social history and prior level of function, completion of standardized testing/informal observation of tasks, assessment of data and education on plan of care and goals.     CPT codes:  [x] Low Complexity PT evaluation 35214  [] Moderate Complexity PT evaluation 02431  [] High Complexity PT evaluation 46527  [] PT Re-evaluation 84871  [] Gait training 72409 - minutes  [] Manual therapy 07127 - minutes  [x] Therapeutic activities 92108 10 minutes  [] Therapeutic exercises 31253 - minutes  [] Neuromuscular reeducation 57797 - minutes     Raymond Lee, PT, DPT  [id number]   Chay Greenberg, SPT

## 2021-12-15 NOTE — PROGRESS NOTES
Lb Rainey was called and notified that the patient's cane was left here and is in my office. She will send someone to get soon.

## 2021-12-15 NOTE — DISCHARGE INSTR - COC
Continuity of Care Form    Patient Name: Amber Brantley   :  1940  MRN:  [id number]    Admit date:  2021  Discharge date:  12/15/21    Code Status Order: Full Code   Advance Directives:      Admitting Physician:  Bettina Qureshi DO  PCP: Ritu Spaulding MD, MD    Discharging Nurse: [facility] - BATH Unit/Room#: 1440/9020-C  Discharging Unit Phone Number: ***[phone]    Emergency Contact:   Extended Emergency Contact Information  Primary Emergency Contact: Juliane Hurt  Mobile Phone: [phone]  Relation: Other  Secondary Emergency Contact: Aranza Palacio  Mobile Phone: [phone]  Relation: Child   needed?  No    Past Surgical History:  Past Surgical History:   Procedure Laterality Date    JOINT REPLACEMENT  2018    bilateral knee     LAPAROSCOPIC APPENDECTOMY N/A 2021    LAPAROSCOPIC DREAD PATCH, PERFORATED POST PYLORIC ULCER performed by Luis Farooq MD at [facility] N/A 2021    EGD DIAGNOSTIC ONLY performed by Luis Farooq MD at [address]       Immunization History:   Immunization History   Administered Date(s) Administered    COVID-19, KARSON Bonner, 30mcg/0.3mL 2021, 2021, 10/20/2021       Active Problems:  Patient Active Problem List   Diagnosis Code    Abdominal pain R10.9    Duodenal ulcer perforation (HCC) K26.5    Type 2 diabetes mellitus with hyperglycemia, without long-term current use of insulin (HCC) E11.65    Delirium R41.0    Acute renal disease N28.9    Hypoglycemia E16.2       Isolation/Infection:   Isolation            No Isolation          Patient Infection Status       None to display            Nurse Assessment:  Last Vital Signs: BP (!) 109/47   Pulse 80   Temp 97.7 °F (36.5 °C) (Oral)   Resp 18   Ht 5' 10\" (1.778 m)   Wt 230 lb 14.4 oz (104.7 kg)   SpO2 98%   BMI 33.13 kg/m²     Last documented pain score (0-10 scale): Pain Level: 0  Last Weight:   Wt Readings from Last 1 Encounters:   12/15/21 230 lb 14.4 oz (104.7 kg)     Mental Status:  oriented and alert    IV Access:  - None    Nursing Mobility/ADLs:  Walking   Assisted  Transfer  Assisted  Bathing  Assisted  Dressing  Assisted  Toileting  Assisted  Feeding  Assisted  Med Admin  Assisted  Med Delivery   whole    Wound Care Documentation and Therapy:        Elimination:  Continence: Bowel: Yes  Bladder: {YES / N}  Urinary Catheter: None   Colostomy/Ileostomy/Ileal Conduit: No       Date of Last BM: ***    Intake/Output Summary (Last 24 hours) at 12/15/2021 0951  Last data filed at 12/15/2021 0519  Gross per 24 hour   Intake 480 ml   Output 700 ml   Net -220 ml     I/O last 3 completed shifts: In: 480 [P.O.:480]  Out: 700 [Urine:700]    Safety Concerns:     None    Impairments/Disabilities:      None    Nutrition Therapy:  Current Nutrition Therapy:   - Oral Diet:  General    Routes of Feeding: Oral  Liquids: Thin Liquids  Daily Fluid Restriction: no  Last Modified Barium Swallow with Video (Video Swallowing Test): not done    Treatments at the Time of Hospital Discharge:   Respiratory Treatments: ***  Oxygen Therapy:  is not on home oxygen therapy.   Ventilator:    - No ventilator support    Rehab Therapies: Physical Therapy and Occupational Therapy  Weight Bearing Status/Restrictions: No weight bearing restirctions  Other Medical Equipment (for information only, NOT a DME order):  {EQUIPMENT:[id number]}  Other Treatments: ***    Patient's personal belongings (please select all that are sent with patient):  None    RN SIGNATURE:  Electronically signed by Lady Boykin RN on 12/15/21 at 11:26 AM EST    CASE MANAGEMENT/SOCIAL WORK SECTION    Inpatient Status Date:       Readmission Risk Assessment Score:  Readmission Risk              Risk of Unplanned Readmission:  16           Discharging to Facility/ Agency   Name:   Address:  Phone:  Fax:    Dialysis Facility (if applicable)   Name:  Address:  Dialysis

## 2021-12-15 NOTE — CARE COORDINATION
Liaison Hilary Maher said Larisa Barros will be available in the next 30 minutes. Covid test was sent by RN  - waiting for results. Completed Envelope and HENS Exemption. Envelope on Soft chart. Pt not answering room phone.    Dhaval Borrego, KAITLYN.S.W.  [phone]

## 2021-12-15 NOTE — PROGRESS NOTES
Hospitalist Progress Note      PCP: Christiane Pang MD, MD    Date of Admission: 12/11/2021    Chief Complaint: Hypoglycemia     Hospital Course: 80 y.o. male with past medical history of DM and GERD . Patient is a transfer from [facility]. He presents to the ED due to hypoglycemia . He states that he has had hypoglycemia twice in 3 days and each time EMS was called and he received julisa crackers and sugar improved . Patients states that he felt no dizziness  He states that his daughter stated that he looked confused  Patient states he checks his sugars regularly and today it was low at 51 . He states that his sugar decreased to 21 and then he called EMS He reports reports no  Nausea or  abdominal  pain . Patient takes Metformin and Amaryl . His last dose of Amaryl was 9 pm 12/10  He reported no shortness of breath fever chills or chest pain . Patient is s/p laparoscopic julisa patch of perforated duodenal ulcer  on 11/18 /2021 . Patient states that since the surgery he has lost 20lbs   Patient was placed on PPI and amoxicillin an clarithromycin for H. Pylori . In the ED patient was hemodynamically stable Lab data reveal glucose 39 sodium 133 creatinine 1.0 magnesium 2.2 WBC 4.8 HGB 9.1  UA no signs of infection . Patient received D50 IV as well as PO challenge and he remained hypoglycemic despite multiple PO challenges . Poison control at the [facility] center was contacted and they recommended avoiding dextrose and giving octreotide .  Patient was transferred for serial glucose monitoring and octreotide     Subjective:  Feeling better without complaint  No CP or SOB  No fever or chills   No uncontrolled pain  No vomiting or diarrhea   No events reported overnight       Medications:  Reviewed    Infusion Medications    sodium chloride      dextrose Stopped (12/12/21 2000)     Scheduled Medications    amLODIPine  10 mg Oral Daily    amoxicillin  1,000 mg Oral BID    aspirin  81 mg Oral Daily    clarithromycin  500 mg Oral BID    pravastatin  40 mg Oral Daily    sucralfate  1 g Oral 4x Daily    pantoprazole  40 mg Oral BID AC    sodium chloride flush  5-40 mL IntraVENous 2 times per day    enoxaparin  40 mg SubCUTAneous Daily    insulin lispro  0-6 Units SubCUTAneous TID WC    insulin lispro  0-3 Units SubCUTAneous Nightly    lisinopril  20 mg Oral Daily    And    hydroCHLOROthiazide  25 mg Oral Daily    sodium chloride  1,000 mL IntraVENous Once     PRN Meds: sodium chloride flush, sodium chloride, ondansetron **OR** ondansetron, polyethylene glycol, acetaminophen **OR** acetaminophen, aluminum & magnesium hydroxide-simethicone, glucose, dextrose, glucagon (rDNA), dextrose      Intake/Output Summary (Last 24 hours) at 12/15/2021 0950  Last data filed at 12/15/2021 0519  Gross per 24 hour   Intake 480 ml   Output 700 ml   Net -220 ml       Exam:    BP (!) 109/47   Pulse 80   Temp 97.7 °F (36.5 °C) (Oral)   Resp 18   Ht 5' 10\" (1.778 m)   Wt 230 lb 14.4 oz (104.7 kg)   SpO2 98%   BMI 33.13 kg/m²     General appearance: No apparent distress, appears stated age and cooperative. HEENT: Pupils equal, round, and reactive to light. Conjunctivae/corneas clear. Neck: Supple, with full range of motion. No jugular venous distention. Trachea midline. Respiratory:  Normal respiratory effort. Clear to auscultation, bilaterally without Rales/Wheezes/Rhonchi. Cardiovascular: Regular rate and rhythm with normal S1/S2 without murmurs, rubs or gallops. Abdomen: Soft, non-tender, non-distended with normal bowel sounds. Musculoskeletal: No clubbing, cyanosis or edema bilaterally. Full range of motion without deformity. Skin: Skin color, texture, turgor normal.  No rashes or lesions. Neurologic:  Neurovascularly intact without any focal sensory/motor deficits.  Cranial nerves: II-XII intact, grossly non-focal.  Psychiatric: Alert and oriented, thought content appropriate, normal insight    Labs:   Recent Labs     12/13/21  0545 12/14/21  0858   WBC 4.8 5.8   HGB 8.9* 9.4*   HCT 29.2* 30.9*    310     Recent Labs     12/13/21  0545 12/14/21  0858    136   K 4.7 4.2    100   CO2 26 24   BUN 9 9   CREATININE 1.1 1.0   CALCIUM 8.9 8.9     No results for input(s): AST, ALT, BILIDIR, BILITOT, ALKPHOS in the last 72 hours. No results for input(s): INR in the last 72 hours. No results for input(s): Carol Gazella in the last 72 hours. Assessment/Plan:    Active Hospital Problems    Diagnosis Date Noted    Hypoglycemia [E16.2] 12/11/2021     Sulfonylurea induced  Hypoglycemia :  Patient is a transfer from [facility] . Glucose was 39 on arrival in ED . Patient remained hypoglycemic despite IV dextrose and PO challenges . Patient takes Metformin 500 mg BID and Amaryl 4 mg . Poison control of the [facility] recommended octreotide . C/q Octreotide infusion fingerstick q4H  A1c  Hold beta blockers because it can cause intermittent hypoglycemia-- cancel Endocrinology consult--     Type 2 Diabetes: Hemoglobin A1c 5.5 11/2021, A1c 5.4 12/20/2021--indicating too aggressive diabetes control. Amaryl should be discontinued,  blood sugars increased last 24 hours. Metformin being continued encourage diabetic control through diet. Given weight loss will place on regular diet. MBS, SSI     History of perforated duodenal ulcer : s/p laparoscopic Jose Roberto patch 11/18/2021  H pylori positive  clarithromycin and amoxicillin x 14 days and PPI BID . C/w Protonix and Sucralfate      Unintentional weight loss:  235 lbs on 11/17 and 228 on 12/11  Unsure if that is correct  Monitor weight daily-     Hypertension : C/w Norvasc  Benza and HCTZ      Microcytic Anemia : chronic iron studies reviewed            DVT Prophylaxis: Lovenox   Diet: ADULT DIET;  Regular  Code Status: Full Code  Disposition SNF today    Damari Doyle MD

## 2021-12-15 NOTE — CARE COORDINATION
Therapy was updated. Notified Liaison - Shannan White that updates were completed for Therapy. PT 16/24. OT 17/24. [facility] for transport for Pt. Shannan White will check for availability for Nancy Olivera. Gave Covid Test to [employer].    Andrew Hernandez, L.S.W.  [phone]

## 2021-12-27 LAB
FUNGUS (MYCOLOGY) CULTURE: NORMAL
FUNGUS STAIN: NORMAL

## 2022-01-11 ENCOUNTER — OFFICE VISIT (OUTPATIENT)
Dept: FAMILY MEDICINE CLINIC | Age: 82
End: 2022-01-11
Payer: MEDICARE

## 2022-01-11 VITALS
OXYGEN SATURATION: 99 % | HEIGHT: 70 IN | BODY MASS INDEX: 31.15 KG/M2 | HEART RATE: 83 BPM | TEMPERATURE: 97.3 F | RESPIRATION RATE: 20 BRPM | WEIGHT: 217.6 LBS | DIASTOLIC BLOOD PRESSURE: 82 MMHG | SYSTOLIC BLOOD PRESSURE: 95 MMHG

## 2022-01-11 DIAGNOSIS — Z91.81 AT HIGH RISK FOR FALLS: ICD-10-CM

## 2022-01-11 DIAGNOSIS — K26.5 DUODENAL ULCER PERFORATION (HCC): ICD-10-CM

## 2022-01-11 DIAGNOSIS — E11.65 TYPE 2 DIABETES MELLITUS WITH HYPERGLYCEMIA, WITHOUT LONG-TERM CURRENT USE OF INSULIN (HCC): Primary | Chronic | ICD-10-CM

## 2022-01-11 DIAGNOSIS — I10 PRIMARY HYPERTENSION: ICD-10-CM

## 2022-01-11 PROBLEM — N28.9 ACUTE RENAL DISEASE: Status: RESOLVED | Noted: 2021-11-18 | Resolved: 2022-01-11

## 2022-01-11 PROBLEM — R10.9 ABDOMINAL PAIN: Status: RESOLVED | Noted: 2021-11-18 | Resolved: 2022-01-11

## 2022-01-11 PROBLEM — R41.0 DELIRIUM: Status: RESOLVED | Noted: 2021-11-18 | Resolved: 2022-01-11

## 2022-01-11 PROBLEM — E16.2 HYPOGLYCEMIA: Status: RESOLVED | Noted: 2021-12-11 | Resolved: 2022-01-11

## 2022-01-11 PROBLEM — I44.0 1ST DEGREE AV BLOCK: Status: ACTIVE | Noted: 2022-01-11

## 2022-01-11 PROBLEM — I49.3 PVC (PREMATURE VENTRICULAR CONTRACTION): Status: ACTIVE | Noted: 2022-01-11

## 2022-01-11 PROCEDURE — 99204 OFFICE O/P NEW MOD 45 MIN: CPT | Performed by: STUDENT IN AN ORGANIZED HEALTH CARE EDUCATION/TRAINING PROGRAM

## 2022-01-11 PROCEDURE — 1111F DSCHRG MED/CURRENT MED MERGE: CPT | Performed by: STUDENT IN AN ORGANIZED HEALTH CARE EDUCATION/TRAINING PROGRAM

## 2022-01-11 PROCEDURE — 1123F ACP DISCUSS/DSCN MKR DOCD: CPT | Performed by: STUDENT IN AN ORGANIZED HEALTH CARE EDUCATION/TRAINING PROGRAM

## 2022-01-11 PROCEDURE — 1036F TOBACCO NON-USER: CPT | Performed by: STUDENT IN AN ORGANIZED HEALTH CARE EDUCATION/TRAINING PROGRAM

## 2022-01-11 PROCEDURE — 4040F PNEUMOC VAC/ADMIN/RCVD: CPT | Performed by: STUDENT IN AN ORGANIZED HEALTH CARE EDUCATION/TRAINING PROGRAM

## 2022-01-11 PROCEDURE — G8417 CALC BMI ABV UP PARAM F/U: HCPCS | Performed by: STUDENT IN AN ORGANIZED HEALTH CARE EDUCATION/TRAINING PROGRAM

## 2022-01-11 PROCEDURE — G8427 DOCREV CUR MEDS BY ELIG CLIN: HCPCS | Performed by: STUDENT IN AN ORGANIZED HEALTH CARE EDUCATION/TRAINING PROGRAM

## 2022-01-11 PROCEDURE — G8484 FLU IMMUNIZE NO ADMIN: HCPCS | Performed by: STUDENT IN AN ORGANIZED HEALTH CARE EDUCATION/TRAINING PROGRAM

## 2022-01-11 RX ORDER — BENAZEPRIL HYDROCHLORIDE 20 MG/1
20 TABLET ORAL DAILY
Qty: 90 TABLET | Refills: 1 | Status: SHIPPED
Start: 2022-01-11 | End: 2022-03-09 | Stop reason: SDUPTHER

## 2022-01-11 RX ORDER — AMLODIPINE BESYLATE 10 MG/1
10 TABLET ORAL DAILY
Qty: 90 TABLET | Refills: 1 | Status: SHIPPED
Start: 2022-01-11 | End: 2022-03-09 | Stop reason: SDUPTHER

## 2022-01-11 RX ORDER — METOPROLOL SUCCINATE 50 MG/1
50 TABLET, EXTENDED RELEASE ORAL DAILY
Qty: 90 TABLET | Refills: 1 | Status: SHIPPED
Start: 2022-01-11 | End: 2022-03-09 | Stop reason: SDUPTHER

## 2022-01-11 RX ORDER — PRAVASTATIN SODIUM 40 MG
40 TABLET ORAL DAILY
Qty: 90 TABLET | Refills: 1 | Status: SHIPPED
Start: 2022-01-11 | End: 2022-03-09 | Stop reason: SDUPTHER

## 2022-01-11 SDOH — ECONOMIC STABILITY: FOOD INSECURITY: WITHIN THE PAST 12 MONTHS, YOU WORRIED THAT YOUR FOOD WOULD RUN OUT BEFORE YOU GOT MONEY TO BUY MORE.: NEVER TRUE

## 2022-01-11 SDOH — ECONOMIC STABILITY: FOOD INSECURITY: WITHIN THE PAST 12 MONTHS, THE FOOD YOU BOUGHT JUST DIDN'T LAST AND YOU DIDN'T HAVE MONEY TO GET MORE.: NEVER TRUE

## 2022-01-11 ASSESSMENT — PATIENT HEALTH QUESTIONNAIRE - PHQ9
SUM OF ALL RESPONSES TO PHQ QUESTIONS 1-9: 0
1. LITTLE INTEREST OR PLEASURE IN DOING THINGS: 0
SUM OF ALL RESPONSES TO PHQ QUESTIONS 1-9: 0
SUM OF ALL RESPONSES TO PHQ9 QUESTIONS 1 & 2: 0
2. FEELING DOWN, DEPRESSED OR HOPELESS: 0

## 2022-01-11 ASSESSMENT — SOCIAL DETERMINANTS OF HEALTH (SDOH): HOW HARD IS IT FOR YOU TO PAY FOR THE VERY BASICS LIKE FOOD, HOUSING, MEDICAL CARE, AND HEATING?: NOT HARD AT ALL

## 2022-01-11 NOTE — PROGRESS NOTES
Patient:  Jaja Bradford 80 y.o. male     Date of Service: 1/11/22      Chiefcomplaint:   Chief Complaint   Patient presents with    Establish Care     History of Present Illness   Duodenal ulcer perf - due for endoscopy  status post laparoscopic Kristen Ayad patch 11/18/2021  Home health ordered. Falls  carafate and ppi    H.pylori   continue ppi daily   +aspirin for dm2? Type 2 dm  Metformin 500mg bid, ace, statin  Fasting controlled now    No CAD, HF, other ascvd according to patient  Last ekg review - sinus with 1st degree block and pac and pvc. Hasn't seen cardio in past. No sob or cp. .      1 adopted child, 1 biological kid in [facility]    Pertinent Medical, Family, Surgical, Social History:  Past Medical History:   Diagnosis Date    [facility]      Physical Exam   Vitals: BP 95/82 (Site: Right Upper Arm, Position: Sitting, Cuff Size: Large Adult)   Pulse 83   Temp 97.3 °F (36.3 °C) (Temporal)   Resp 20   Ht 5' 10\" (1.778 m)   Wt 217 lb 9.6 oz (98.7 kg)   SpO2 99%   BMI 31.22 kg/m²   General Appearance: Alert, oriented, no acute distress  HEENT: No scleral icterus. No visible discharge from eyes  Neck: Not rigid. No visible masses. No carotid bruit  Chest wall/Lung: Clear to auscultation bilaterally,  respirations unlabored. No ronchi/wheezing/rales  Heart: RRR, no murmur  Abdomen: Soft, nontender  Extremities:  No edema  Skin: No rashes. No jaundice  Neuro: Alert and oriented        Psych: Appropriate mood and appropriate affect    Assessment and Plan   1. Type 2 diabetes mellitus with hyperglycemia, without long-term current use of insulin (HCC)  Controlled. cotninue metformin. Maybe only prediab? 2. Duodenal ulcer perforation (HCC)  Ppi, carafate, egd. hpylori tx. Stop aspirin. No pain now. 3. Primary hypertension  Stop hctz due to fall/lightheadedness. Continue other meds. Recheck labs. Check home pressures. Stop doxazosin. Not sure indication for this.  Denies bph.  -     CBC Auto Differential; Future  -     Lipid Panel; Future  -     Comprehensive Metabolic Panel; Future        Return in about 3 months (around 4/11/2022). Nicole Ford, DO     This document may have been prepared at least partially through the use of voice recognition software. Although effort is taken to assure the accuracy of this document, it is possible that grammatical, syntax,  or spelling errors may occur. On the basis of positive falls risk screening, assessment and plan is as follows: medications adjusted- as above.

## 2022-02-01 NOTE — PROGRESS NOTES
Jak [facility] PRE-ADMISSION TESTING GENERAL INSTRUCTIONS- [facility]-phone number:[phone]    GENERAL INSTRUCTIONS  [x] Antibacterial Soap shower Night before and/or AM of Surgery  [] Benson wipe instruction sheet and wipes given. [x] Nothing by mouth after midnight, including gum, candy, mints, or water. [x] You may brush your teeth, gargle, but do NOT swallow water. []Hibiclens shower  the night before and the morning of surgery. Do not use             Hibiclens on your face or head. [x]No smoking, chewing tobacco, illegal drugs, or alcohol within 24 hours of your surgery. [x] Jewelry, valuables or body piercing's should not be brought to the hospital. All body and/or tongue piercing's must be removed prior to arriving to hospital.  ALL hair pins must be removed. [x] Do not wear makeup, lotions, powders, deodorant. Nail polish as directed by the nurse. [x] Arrange transportation with a responsible adult  to and from the hospital. If you do not have a responsible adult  to transport you, you will need to make arrangements with a medical transportation company (i.e. [employer]. A Uber/taxi/bus is not appropriate unless you are accompanied by a responsible adult ). Arrange for someone to be with you for the remainder of the day and for 24 hours after your procedure due to having had anesthesia. Who will be your  for transportation?__friend________________   Who will be staying with you for 24 hrs after your procedure?__friend________________  [x] Bring insurance card and photo ID.  [] Transfusion Bracelet: Please bring with you to hospital, day of surgery  [] Bring urine specimen day of surgery. Any small container is acceptable. [] Use inhalers the morning of surgery and bring with you to hospital.  [] Bring copy of living will or healthcare power of  papers to be placed in your electronic record.   [] CPAP/BI-PAP: Please bring your machine if you are to spend the night in the hospital.     PARKING INSTRUCTIONS:   [x] Arrival Time: 0900, wear mask   · [x] Parking lot '\"I\"  is located on [facility] (the corner of [facility] and [facility]). To enter, press the button and the gate will lift. A free token will be provided to exit the lot. One car per patient is allowed to park in this lot. All other cars are to park on [address] [place] [address] either in the parking garage or the handicap lot. [] To reach the [facility] lobby from [address] [place] [address], upon entering the hospital, take elevator B to the 3rd floor. EDUCATION INSTRUCTIONS:      [] Knee or hip replacement booklet & exercise pamphlets given. [] Sophie Steiner placed in chart. [] Pre-admission Testing educational folder given  [] Incentive Spirometry,coughing & deep breathing exercises reviewed. []Medication information sheet(s)   []Fluoroscopy-Xray used in surgery reviewed with patient. Educational pamphlet placed in chart. []Pain: Post-op pain is normal and to be expected. You will be asked to rate your pain from 0-10(a zero is not acceptable-education is needed). Your post-op pain goal is:  [] Ask your nurse for your pain medication. [] Joint camp offered. [] Joint replacement booklets given. [] Other:___________________________    MEDICATION INSTRUCTIONS:   [x]Bring a complete list of your medications, please write the last time you took the medicine, give this list to the nurse. [x] Take the following medications the morning of surgery with 1-2 ounces of water: see list  [x] Stop herbal supplements and vitamins 5 days before your surgery. [x] DO NOT take any diabetic medicine the morning of surgery. Follow instructions for insulin the day before surgery. [x] If you are diabetic and your blood sugar is low or you feel symptomatic, you may drink 1-2 ounces of apple juice or take a glucose tablet.   The morning of your procedure, you may call the

## 2022-02-06 ENCOUNTER — ANESTHESIA EVENT (OUTPATIENT)
Dept: ENDOSCOPY | Age: 82
End: 2022-02-06
Payer: MEDICARE

## 2022-02-06 RX ORDER — SODIUM CHLORIDE 0.9 % (FLUSH) 0.9 %
10 SYRINGE (ML) INJECTION PRN
Status: CANCELLED | OUTPATIENT
Start: 2022-02-06

## 2022-02-06 RX ORDER — SODIUM CHLORIDE 0.9 % (FLUSH) 0.9 %
10 SYRINGE (ML) INJECTION EVERY 12 HOURS SCHEDULED
Status: CANCELLED | OUTPATIENT
Start: 2022-02-06

## 2022-02-07 ENCOUNTER — ANESTHESIA (OUTPATIENT)
Dept: ENDOSCOPY | Age: 82
End: 2022-02-07
Payer: MEDICARE

## 2022-02-07 ENCOUNTER — HOSPITAL ENCOUNTER (OUTPATIENT)
Age: 82
Setting detail: OUTPATIENT SURGERY
Discharge: HOME | End: 2022-02-07
Attending: SURGERY | Admitting: SURGERY
Payer: MEDICARE

## 2022-02-07 VITALS
HEART RATE: 80 BPM | HEIGHT: 70 IN | OXYGEN SATURATION: 100 % | BODY MASS INDEX: 31.07 KG/M2 | SYSTOLIC BLOOD PRESSURE: 120 MMHG | WEIGHT: 217 LBS | TEMPERATURE: 97.7 F | DIASTOLIC BLOOD PRESSURE: 60 MMHG | RESPIRATION RATE: 18 BRPM

## 2022-02-07 VITALS
OXYGEN SATURATION: 94 % | RESPIRATION RATE: 28 BRPM | SYSTOLIC BLOOD PRESSURE: 111 MMHG | DIASTOLIC BLOOD PRESSURE: 53 MMHG

## 2022-02-07 DIAGNOSIS — Z01.812 PRE-OPERATIVE LABORATORY EXAMINATION: Primary | ICD-10-CM

## 2022-02-07 LAB
METER GLUCOSE: 122 MG/DL (ref 74–99)
METER GLUCOSE: 139 MG/DL (ref 74–99)

## 2022-02-07 PROCEDURE — 2580000003 HC RX 258: Performed by: SURGERY

## 2022-02-07 PROCEDURE — 7100000010 HC PHASE II RECOVERY - FIRST 15 MIN: Performed by: SURGERY

## 2022-02-07 PROCEDURE — 3700000001 HC ADD 15 MINUTES (ANESTHESIA): Performed by: SURGERY

## 2022-02-07 PROCEDURE — 3700000000 HC ANESTHESIA ATTENDED CARE: Performed by: SURGERY

## 2022-02-07 PROCEDURE — 3609012400 HC EGD TRANSORAL BIOPSY SINGLE/MULTIPLE: Performed by: SURGERY

## 2022-02-07 PROCEDURE — 2580000003 HC RX 258: Performed by: NURSE ANESTHETIST, CERTIFIED REGISTERED

## 2022-02-07 PROCEDURE — 2709999900 HC NON-CHARGEABLE SUPPLY: Performed by: SURGERY

## 2022-02-07 PROCEDURE — 6360000002 HC RX W HCPCS: Performed by: NURSE ANESTHETIST, CERTIFIED REGISTERED

## 2022-02-07 PROCEDURE — 82962 GLUCOSE BLOOD TEST: CPT

## 2022-02-07 PROCEDURE — 7100000011 HC PHASE II RECOVERY - ADDTL 15 MIN: Performed by: SURGERY

## 2022-02-07 PROCEDURE — 88342 IMHCHEM/IMCYTCHM 1ST ANTB: CPT

## 2022-02-07 PROCEDURE — 88305 TISSUE EXAM BY PATHOLOGIST: CPT

## 2022-02-07 PROCEDURE — 43239 EGD BIOPSY SINGLE/MULTIPLE: CPT | Performed by: SURGERY

## 2022-02-07 RX ORDER — PROPOFOL 10 MG/ML
INJECTION, EMULSION INTRAVENOUS PRN
Status: DISCONTINUED | OUTPATIENT
Start: 2022-02-07 | End: 2022-02-07 | Stop reason: SDUPTHER

## 2022-02-07 RX ORDER — SODIUM CHLORIDE 9 MG/ML
INJECTION, SOLUTION INTRAVENOUS CONTINUOUS
Status: DISCONTINUED | OUTPATIENT
Start: 2022-02-07 | End: 2022-02-07 | Stop reason: HOSPADM

## 2022-02-07 RX ORDER — SODIUM CHLORIDE 9 MG/ML
25 INJECTION, SOLUTION INTRAVENOUS PRN
Status: DISCONTINUED | OUTPATIENT
Start: 2022-02-07 | End: 2022-02-07 | Stop reason: HOSPADM

## 2022-02-07 RX ORDER — LIDOCAINE HYDROCHLORIDE 20 MG/ML
INJECTION, SOLUTION INTRAVENOUS PRN
Status: DISCONTINUED | OUTPATIENT
Start: 2022-02-07 | End: 2022-02-07 | Stop reason: SDUPTHER

## 2022-02-07 RX ORDER — SODIUM CHLORIDE 9 MG/ML
INJECTION, SOLUTION INTRAVENOUS CONTINUOUS PRN
Status: DISCONTINUED | OUTPATIENT
Start: 2022-02-07 | End: 2022-02-07 | Stop reason: SDUPTHER

## 2022-02-07 RX ADMIN — PROPOFOL 200 MG: 10 INJECTION, EMULSION INTRAVENOUS at 10:18

## 2022-02-07 RX ADMIN — SODIUM CHLORIDE: 9 INJECTION, SOLUTION INTRAVENOUS at 10:12

## 2022-02-07 RX ADMIN — LIDOCAINE HYDROCHLORIDE 40 MG: 20 INJECTION, SOLUTION INTRAVENOUS at 10:18

## 2022-02-07 RX ADMIN — SODIUM CHLORIDE: 9 INJECTION, SOLUTION INTRAVENOUS at 10:08

## 2022-02-07 ASSESSMENT — PAIN SCALES - GENERAL
PAINLEVEL_OUTOF10: 0
PAINLEVEL_OUTOF10: 0

## 2022-02-07 ASSESSMENT — PAIN - FUNCTIONAL ASSESSMENT: PAIN_FUNCTIONAL_ASSESSMENT: 0-10

## 2022-02-07 NOTE — OP NOTE
Operative Note      Patient: Irma Feliciano  YOB: 1940  MRN: [id number]    Date of Procedure: 2/7/2022    Pre-Op Diagnosis: DUODENAL ULCER    Post-Op Diagnosis: mild duodenitis, Healed duodenal ulcer       Procedure(s):  EGD BIOPSY  Antral and duodenal     Surgeon(s):  Marita Amezquita MD    Assistant:   * No surgical staff found *    Anesthesia: Monitor Anesthesia Care    Estimated Blood Loss (mL): Minimal    Complications: None    Specimens:   ID Type Source Tests Collected by Time Destination   A : gastric antral bx r/o hpylori Antrum Antrum SURGICAL PATHOLOGY Marita Amezquita MD 2/7/2022 1021    B : duodenal bx Tissue Duodenum SURGICAL PATHOLOGY Marita Amezquita MD 2/7/2022 1021        Implants:  * No implants in log *      Drains: * No LDAs found *    Findings: As above     Detailed Description of Procedure: The patient was placed on the table and sedated. The throat was numbed with Cetacaine spray. Bite block was placed. A lubricated scope was easily passed into the upper esophagus which looked  normal . The distal esophagus looked  normal. The scope was passed into the stomach and retroflexed. There was no hiatal hernia. The GE [place] was at 40cm. The scope was passed down toward the pylorus. The antral mucosa all looked normal. Antral biopsy was taken to check for H. pylori. The scope was then passed through the pylorus into the duodenal bulb which looked essentially, mild duodenitis and a small area of  raised  normal appearing duodenal tissue likely at area of healed ulcer from recent [facility] TRA[facility]MA [place] patch, biopsies taken, then around to the distal duodenum which looked  normal. The scope was then withdrawn. The patient tolerated the procedure well.        PLAN:  Continue PPI BID    H pylori Bx results pending     Will call with results       Electronically signed by Marita Amezquita MD on 2/7/2022 at 10:30 AM

## 2022-02-07 NOTE — ANESTHESIA PRE PROCEDURE
Department of Anesthesiology  Preprocedure Note       Name:  Billy Munoz   Age:  80 y.o.  :  1940                                          MRN:  [id number]         Date:  2022      Surgeon: Radha Wilson):  Lemuel Mcburney, MD    Procedure: Procedure(s):  EGD ESOPHAGOGASTRODUODENOSCOPY    Medications prior to admission:   Prior to Admission medications    Medication Sig Start Date End Date Taking?  Authorizing Provider   benazepril (LOTENSIN) 20 MG tablet Take 1 tablet by mouth daily 1/11/22 7/10/22 Yes Boo Landa DO   amLODIPine (NORVASC) 10 MG tablet Take 1 tablet by mouth daily 22  Yes Boo Landa DO   pravastatin (PRAVACHOL) 40 MG tablet Take 1 tablet by mouth daily 1/11/22 7/10/22 Yes Boo Landa DO   metoprolol succinate (TOPROL XL) 50 MG extended release tablet Take 1 tablet by mouth daily 1/11/22 7/10/22 Yes Boo Landa DO   metFORMIN (GLUCOPHAGE) 500 MG tablet Take 1 tablet by mouth 2 times daily (with meals) 1/11/22 7/10/22 Yes Boo Landa DO   pantoprazole (PROTONIX) 40 MG tablet Take 1 tablet by mouth 2 times daily (before meals) 21  Yes Lemuel Mcburney, MD       Current medications:    Current Facility-Administered Medications   Medication Dose Route Frequency Provider Last Rate Last Admin    0.9 % sodium chloride infusion   IntraVENous Continuous Lemuel Mcburney, MD        0.9 % sodium chloride infusion  25 mL IntraVENous PRN Lemuel Mcburney, MD           Allergies:  No Known Allergies    Problem List:    Patient Active Problem List   Diagnosis Code    [facility] K26.5    Type 2 diabetes mellitus with hyperglycemia, without long-term current use of insulin (HCC) E11.65    Primary hypertension I10    1st degree AV block I44.0    PVC (premature ventricular contraction) I49.3       Past Medical History:        Diagnosis Date    Diabetes mellitus ([facility]        Past Surgical History:        Procedure Laterality LABGLOM >60 12/14/2021    GLUCOSE 141 12/14/2021    PROT 5.6 11/24/2021    CALCIUM 8.9 12/14/2021    BILITOT 0.3 12/11/2021    ALKPHOS 41 12/11/2021    AST 54 12/11/2021    ALT 43 12/11/2021       POC Tests: No results for input(s): POCGLU, POCNA, POCK, POCCL, POCBUN, POCHEMO, POCHCT in the last 72 hours.     Coags: No results found for: PROTIME, INR, APTT    HCG (If Applicable): No results found for: PREGTESTUR, PREGSERUM, HCG, HCGQUANT     ABGs:   Lab Results   Component Value Date    PO2ART 119.4 11/18/2021    LRT2RZT 41.6 11/18/2021    BGL7BUB 22.3 11/18/2021        Type & Screen (If Applicable):  No results found for: LABABO, LABRH    Drug/Infectious Status (If Applicable):  No results found for: HIV, HEPCAB    COVID-19 Screening (If Applicable):   Lab Results   Component Value Date    COVID19 Not Detected 12/15/2021     EKG 12 Lead  Order: [id number]   Status: Final result     Visible to patient: Yes (not seen)     Next appt: None     0 Result Notes    Component Ref Range & Units 11/17/21 1516 9/7/17 1009   Ventricular Rate BPM 89  73    Atrial Rate BPM 89  73    P-R Interval ms 216  196    QRS Duration ms 90  98    Q-T Interval ms 364  368    QTc Calculation (Bazett) ms 442  405    P [place] degrees 16  21    R Axis degrees 1  6    T Axis degrees 19  11    Resulting Agency  [facility]             Narrative & Impression    Sinus rhythm with 1st degree AV block with occasional premature ventricular complexes and premature atrial complexes  Inferior infarct , age undetermined  Abnormal ECG  No previous ECGs available  Confirmed by Chris Screen ([id number]) on 11/18/2021 9:19:21 AM                 Anesthesia Evaluation  Patient summary reviewed and Nursing notes reviewed no history of anesthetic complications:   Airway: Mallampati: II  TM distance: >3 FB   Neck ROM: full  Mouth opening: > = 3 FB Dental:      Comment: Pt denies any loose missing chipped or cracked teeth    Pulmonary:Negative Pulmonary ROS and normal exam  breath sounds clear to auscultation                             Cardiovascular:    (+) hypertension:, dysrhythmias (1 degree Av block, PVC):, hyperlipidemia      ECG reviewed  Rhythm: regular  Rate: normal           Beta Blocker:  Dose within 24 Hrs         Neuro/Psych:   Negative Neuro/Psych ROS              GI/Hepatic/Renal:   (+) PUD,          ROS comment: Duodenal ulcer perforation  . Endo/Other:    (+) DiabetesType II DM, , .          Pt had no PAT visit       Abdominal:   (+) obese,           Vascular: negative vascular ROS. Other Findings:           Anesthesia Plan      MAC     ASA 3       Induction: intravenous. Anesthetic plan and risks discussed with patient. Plan discussed with attending.                   Mendel Reeds, APRN - CRNA   2/7/2022

## 2022-02-07 NOTE — ANESTHESIA POSTPROCEDURE EVALUATION
Department of Anesthesiology  Postprocedure Note    Patient: Bonifacio Pelletier  MRN: [id number]  YOB: 1940  Date of evaluation: 2/7/2022  Time:  11:17 AM     Procedure Summary     Date: 02/07/22 Room / Location: [address] [place] [address] / [facility]    Anesthesia Start: 1012 Anesthesia Stop: 0841    Procedure: EGD BIOPSY (N/A ) Diagnosis: (DUODENAL ULCER)    Surgeons: Maryana Rick MD Responsible Provider: Michael Goldstein MD    Anesthesia Type: MAC ASA Status: 3          Anesthesia Type: MAC    Gilson Phase I: Gilson Score: 10    Gilson Phase II: Gilson Score: 10    Last vitals: Reviewed and per EMR flowsheets.        Anesthesia Post Evaluation    Patient location during evaluation: PACU  Patient participation: complete - patient participated  Level of consciousness: awake  Pain score: 0  Airway patency: patent  Nausea & Vomiting: no nausea  Complications: no  Cardiovascular status: hemodynamically stable  Respiratory status: acceptable  Hydration status: stable

## 2022-02-11 ENCOUNTER — TELEPHONE (OUTPATIENT)
Dept: SURGERY | Age: 82
End: 2022-02-11

## 2022-02-11 NOTE — TELEPHONE ENCOUNTER
----- Message from Hugo Saba MD sent at 2/11/2022 10:33 AM EST -----  Please let him know his biopsies were unremarkable he is to stay on his proton pump inhibitor and follow-up as needed.     Hugo Saba MD

## 2022-02-11 NOTE — TELEPHONE ENCOUNTER
MA contacted patient to inform of results. Patient verbalized confirmation and understanding.      Electronically signed by Lance Jules on 2/11/22 at 2:43 PM EST

## 2022-03-03 RX ORDER — PANTOPRAZOLE SODIUM 40 MG/1
40 TABLET, DELAYED RELEASE ORAL
Qty: 60 TABLET | Refills: 0 | Status: SHIPPED
Start: 2022-03-03 | End: 2022-03-09 | Stop reason: SDUPTHER

## 2022-03-07 ENCOUNTER — TELEPHONE (OUTPATIENT)
Dept: FAMILY MEDICINE CLINIC | Age: 82
End: 2022-03-07

## 2022-03-07 DIAGNOSIS — E11.65 TYPE 2 DIABETES MELLITUS WITH HYPERGLYCEMIA, WITHOUT LONG-TERM CURRENT USE OF INSULIN (HCC): Primary | ICD-10-CM

## 2022-03-07 RX ORDER — BLOOD PRESSURE TEST KIT
KIT MISCELLANEOUS
COMMUNITY
End: 2022-03-07 | Stop reason: SDUPTHER

## 2022-03-07 RX ORDER — BLOOD-GLUCOSE METER
EACH MISCELLANEOUS
COMMUNITY
End: 2022-03-07 | Stop reason: SDUPTHER

## 2022-03-08 RX ORDER — CALCIUM CITRATE/VITAMIN D3 200MG-6.25
TABLET ORAL
Qty: 100 EACH | Refills: 5 | Status: SHIPPED
Start: 2022-03-08 | End: 2022-03-09 | Stop reason: SDUPTHER

## 2022-03-08 RX ORDER — BLOOD-GLUCOSE CONTROL, LOW
EACH MISCELLANEOUS
Qty: 1 EACH | Refills: 5 | Status: SHIPPED | OUTPATIENT
Start: 2022-03-08

## 2022-03-08 RX ORDER — BLOOD PRESSURE TEST KIT
KIT MISCELLANEOUS
Qty: 100 EACH | Refills: 5 | Status: SHIPPED
Start: 2022-03-08 | End: 2022-03-09 | Stop reason: SDUPTHER

## 2022-03-08 RX ORDER — BLOOD-GLUCOSE METER
EACH MISCELLANEOUS
Qty: 1 EACH | Refills: 0 | Status: SHIPPED | OUTPATIENT
Start: 2022-03-08

## 2022-03-09 ENCOUNTER — TELEPHONE (OUTPATIENT)
Dept: FAMILY MEDICINE CLINIC | Age: 82
End: 2022-03-09

## 2022-03-09 DIAGNOSIS — E11.65 TYPE 2 DIABETES MELLITUS WITH HYPERGLYCEMIA, WITHOUT LONG-TERM CURRENT USE OF INSULIN (HCC): ICD-10-CM

## 2022-03-09 RX ORDER — AMLODIPINE BESYLATE 10 MG/1
10 TABLET ORAL DAILY
Qty: 90 TABLET | Refills: 1 | Status: SHIPPED | OUTPATIENT
Start: 2022-03-09

## 2022-03-09 RX ORDER — BLOOD PRESSURE TEST KIT
KIT MISCELLANEOUS
Qty: 100 EACH | Refills: 5 | Status: SHIPPED | OUTPATIENT
Start: 2022-03-09

## 2022-03-09 RX ORDER — METOPROLOL SUCCINATE 50 MG/1
50 TABLET, EXTENDED RELEASE ORAL DAILY
Qty: 90 TABLET | Refills: 1 | Status: SHIPPED | OUTPATIENT
Start: 2022-03-09 | End: 2022-09-05

## 2022-03-09 RX ORDER — PRAVASTATIN SODIUM 40 MG
40 TABLET ORAL DAILY
Qty: 90 TABLET | Refills: 1 | Status: SHIPPED | OUTPATIENT
Start: 2022-03-09 | End: 2022-09-05

## 2022-03-09 RX ORDER — BENAZEPRIL HYDROCHLORIDE 20 MG/1
20 TABLET ORAL DAILY
Qty: 90 TABLET | Refills: 1 | Status: SHIPPED | OUTPATIENT
Start: 2022-03-09 | End: 2022-09-05

## 2022-03-09 RX ORDER — CALCIUM CITRATE/VITAMIN D3 200MG-6.25
TABLET ORAL
Qty: 100 EACH | Refills: 5 | Status: SHIPPED
Start: 2022-03-09 | End: 2022-03-09 | Stop reason: SDUPTHER

## 2022-03-09 RX ORDER — CALCIUM CITRATE/VITAMIN D3 200MG-6.25
TABLET ORAL
Qty: 100 EACH | Refills: 5 | Status: SHIPPED | OUTPATIENT
Start: 2022-03-09

## 2022-03-09 RX ORDER — PANTOPRAZOLE SODIUM 40 MG/1
40 TABLET, DELAYED RELEASE ORAL
Qty: 180 TABLET | Refills: 0 | Status: SHIPPED
Start: 2022-03-09 | End: 2022-06-03

## 2022-03-09 NOTE — TELEPHONE ENCOUNTER
Pt calling and states that he did not get his test strips for his new glucometer, pharmacy had everything else. I noticed that strips were [place] [facility] of [facility] with the other rx's. Can you please sent to [facility].

## 2022-03-09 NOTE — TELEPHONE ENCOUNTER
Janee Thompson 20 minutes ago (9:02 AM)     BB       Please resend all prescriptions to [address]

## 2022-06-03 RX ORDER — PANTOPRAZOLE SODIUM 40 MG/1
40 TABLET, DELAYED RELEASE ORAL DAILY
Qty: 90 TABLET | Refills: 1 | Status: SHIPPED | OUTPATIENT
Start: 2022-06-03

## 2022-06-24 LAB — DIABETIC RETINOPATHY: NEGATIVE

## 2022-07-26 ENCOUNTER — OFFICE VISIT (OUTPATIENT)
Dept: FAMILY MEDICINE CLINIC | Age: 82
End: 2022-07-26
Payer: MEDICARE

## 2022-07-26 VITALS
DIASTOLIC BLOOD PRESSURE: 66 MMHG | RESPIRATION RATE: 18 BRPM | OXYGEN SATURATION: 98 % | BODY MASS INDEX: 34.89 KG/M2 | SYSTOLIC BLOOD PRESSURE: 127 MMHG | TEMPERATURE: 97.3 F | HEIGHT: 68 IN | WEIGHT: 230.2 LBS | HEART RATE: 62 BPM

## 2022-07-26 DIAGNOSIS — E78.2 MIXED HYPERLIPIDEMIA: ICD-10-CM

## 2022-07-26 DIAGNOSIS — E11.65 TYPE 2 DIABETES MELLITUS WITH HYPERGLYCEMIA, WITHOUT LONG-TERM CURRENT USE OF INSULIN (HCC): Primary | ICD-10-CM

## 2022-07-26 DIAGNOSIS — M47.9 ARTHRITIS OF BACK: ICD-10-CM

## 2022-07-26 LAB — HBA1C MFR BLD: 7 %

## 2022-07-26 PROCEDURE — G8417 CALC BMI ABV UP PARAM F/U: HCPCS | Performed by: STUDENT IN AN ORGANIZED HEALTH CARE EDUCATION/TRAINING PROGRAM

## 2022-07-26 PROCEDURE — G8427 DOCREV CUR MEDS BY ELIG CLIN: HCPCS | Performed by: STUDENT IN AN ORGANIZED HEALTH CARE EDUCATION/TRAINING PROGRAM

## 2022-07-26 PROCEDURE — 83036 HEMOGLOBIN GLYCOSYLATED A1C: CPT | Performed by: STUDENT IN AN ORGANIZED HEALTH CARE EDUCATION/TRAINING PROGRAM

## 2022-07-26 PROCEDURE — 3051F HG A1C>EQUAL 7.0%<8.0%: CPT | Performed by: STUDENT IN AN ORGANIZED HEALTH CARE EDUCATION/TRAINING PROGRAM

## 2022-07-26 PROCEDURE — 1123F ACP DISCUSS/DSCN MKR DOCD: CPT | Performed by: STUDENT IN AN ORGANIZED HEALTH CARE EDUCATION/TRAINING PROGRAM

## 2022-07-26 PROCEDURE — 99214 OFFICE O/P EST MOD 30 MIN: CPT | Performed by: STUDENT IN AN ORGANIZED HEALTH CARE EDUCATION/TRAINING PROGRAM

## 2022-07-26 PROCEDURE — 1036F TOBACCO NON-USER: CPT | Performed by: STUDENT IN AN ORGANIZED HEALTH CARE EDUCATION/TRAINING PROGRAM

## 2022-07-26 RX ORDER — BLOOD-GLUCOSE TRANSMITTER
EACH MISCELLANEOUS
Qty: 4 EACH | Refills: 0 | Status: SHIPPED | OUTPATIENT
Start: 2022-07-26

## 2022-07-26 RX ORDER — BLOOD-GLUCOSE SENSOR
EACH MISCELLANEOUS
Qty: 4 EACH | Refills: 0 | Status: SHIPPED | OUTPATIENT
Start: 2022-07-26

## 2022-07-26 SDOH — ECONOMIC STABILITY: HOUSING INSECURITY
IN THE LAST 12 MONTHS, WAS THERE A TIME WHEN YOU DID NOT HAVE A STEADY PLACE TO SLEEP OR SLEPT IN A SHELTER (INCLUDING NOW)?: NO

## 2022-07-26 SDOH — HEALTH STABILITY: PHYSICAL HEALTH: ON AVERAGE, HOW MANY DAYS PER WEEK DO YOU ENGAGE IN MODERATE TO STRENUOUS EXERCISE (LIKE A BRISK WALK)?: 0 DAYS

## 2022-07-26 SDOH — ECONOMIC STABILITY: HOUSING INSECURITY: IN THE LAST 12 MONTHS, HOW MANY PLACES HAVE YOU LIVED?: 1

## 2022-07-26 SDOH — ECONOMIC STABILITY: TRANSPORTATION INSECURITY
IN THE PAST 12 MONTHS, HAS THE LACK OF TRANSPORTATION KEPT YOU FROM MEDICAL APPOINTMENTS OR FROM GETTING MEDICATIONS?: NO

## 2022-07-26 SDOH — ECONOMIC STABILITY: INCOME INSECURITY: IN THE LAST 12 MONTHS, WAS THERE A TIME WHEN YOU WERE NOT ABLE TO PAY THE MORTGAGE OR RENT ON TIME?: NO

## 2022-07-26 SDOH — ECONOMIC STABILITY: TRANSPORTATION INSECURITY
IN THE PAST 12 MONTHS, HAS LACK OF TRANSPORTATION KEPT YOU FROM MEETINGS, WORK, OR FROM GETTING THINGS NEEDED FOR DAILY LIVING?: NO

## 2022-07-26 SDOH — HEALTH STABILITY: PHYSICAL HEALTH: ON AVERAGE, HOW MANY MINUTES DO YOU ENGAGE IN EXERCISE AT THIS LEVEL?: 0 MIN

## 2022-07-26 ASSESSMENT — SOCIAL DETERMINANTS OF HEALTH (SDOH)
HOW OFTEN DO YOU ATTEND CHURCH OR RELIGIOUS SERVICES?: NEVER
WITHIN THE LAST YEAR, HAVE YOU BEEN AFRAID OF YOUR PARTNER OR EX-PARTNER?: NO
DO YOU BELONG TO ANY CLUBS OR ORGANIZATIONS SUCH AS CHURCH GROUPS UNIONS, FRATERNAL OR ATHLETIC GROUPS, OR SCHOOL GROUPS?: NO
WITHIN THE LAST YEAR, HAVE TO BEEN RAPED OR FORCED TO HAVE ANY KIND OF SEXUAL ACTIVITY BY YOUR PARTNER OR EX-PARTNER?: NO
WITHIN THE LAST YEAR, HAVE YOU BEEN KICKED, HIT, SLAPPED, OR OTHERWISE PHYSICALLY HURT BY YOUR PARTNER OR EX-PARTNER?: NO
IN A TYPICAL WEEK, HOW MANY TIMES DO YOU TALK ON THE PHONE WITH FAMILY, FRIENDS, OR NEIGHBORS?: MORE THAN THREE TIMES A WEEK
HOW OFTEN DO YOU ATTENT MEETINGS OF THE CLUB OR ORGANIZATION YOU BELONG TO?: NEVER
HOW OFTEN DO YOU GET TOGETHER WITH FRIENDS OR RELATIVES?: TWICE A WEEK
WITHIN THE LAST YEAR, HAVE YOU BEEN HUMILIATED OR EMOTIONALLY ABUSED IN OTHER WAYS BY YOUR PARTNER OR EX-PARTNER?: NO

## 2022-07-26 ASSESSMENT — LIFESTYLE VARIABLES: HOW OFTEN DO YOU HAVE A DRINK CONTAINING ALCOHOL: NEVER

## 2022-07-26 NOTE — PROGRESS NOTES
Patient:  Tarik Duran 80 y.o. male     07/26/22      Chiefcomplaint:   Chief Complaint   Patient presents with    Diabetes    Discuss Medications     Pantoprazole used to be bid     Problems and Overview   PPI was bid. Now daily. Cleared by gi with no fu atp. No sx today. DM2 - increasing - previously on glimepiride but there was some concern about interaction with another med so it was stopped. No lows  On metformin  Would like cgm  HYPERLIPIDEMIA -  patient is on Pravachol (pravastatin) 40mg  No results found for: HDL  No results found for: LDLCHOLESTEROL, LDLCALC  No results found for: LABVLDL, VLDL  no previous ASCVD. The ASCVD Risk score (Carl Sampson, et al., 2013) failed to calculate for the following reasons:    Cannot find a previous HDL lab    Cannot find a previous total cholesterol lab      Lab Results   Component Value Date    LABA1C 7.0 07/26/2022     Patient Active Problem List    Diagnosis Date Noted    Arthritis of back 07/26/2022     Priority: Medium     B/l lower lateral back painful in morning. Works out on its own      Mixed hyperlipidemia 07/26/2022     Priority: Medium    Primary hypertension 01/11/2022    1st degree AV block 01/11/2022    PVC (premature ventricular contraction) 01/11/2022     No hx cad according to pt. On metoprolol.        Duodenal ulcer perforation ([facility] [address] 11/18/2021 Nov 2021      Type 2 diabetes mellitus with hyperglycemia, without long-term current use of insulin ([facility] [address] 11/18/2021      Prevention:  Health Maintenance Due   Topic Date Due    Annual Wellness Visit (AWV)  Never done    Pneumococcal 65+ years Vaccine (1 - PCV) Never done    Diabetic foot exam  Never done    Lipids  Never done    Hepatitis C screen  Never done    DTaP/Tdap/Td vaccine (1 - Tdap) Never done    Shingles vaccine (1 of 2) Never done      Meds prior:  Current Outpatient Medications   Medication Instructions    Alcohol Swabs PADS Use to cleanse skin for 30 seconds prior to checking bs amLODIPine (NORVASC) 10 mg, Oral, DAILY    benazepril (LOTENSIN) 20 mg, Oral, DAILY    Blood Glucose Calibration (TRUE METRIX LEVEL 1) Low SOLN Use to calibrate glucometer once monthly    Blood Glucose Monitoring Suppl (TRUE METRIX METER) MAUREEN Use to check bs 1 daily    blood glucose test strips (TRUE METRIX BLOOD GLUCOSE TEST) strip Use to check bs once daily    Continuous Blood Gluc Sensor (DEXCOM G6 SENSOR) MISC Use transmitter daily to check sugar up to 3 times daily. Continuous Blood Gluc Transmit (DEXCOM G6 TRANSMITTER) MISC Use transmitter daily to check sugar up to 3 times daily. Lancets 30G MISC Use to check bs once daily    metFORMIN (GLUCOPHAGE) 500 mg, Oral, 2 TIMES DAILY WITH MEALS    metoprolol succinate (TOPROL XL) 50 mg, Oral, DAILY    pantoprazole (PROTONIX) 40 mg, Oral, DAILY    pravastatin (PRAVACHOL) 40 mg, Oral, DAILY      Physical Exam   Vitals: /66 (Site: Right Upper Arm, Position: Sitting, Cuff Size: Large Adult)   Pulse 62   Temp 97.3 °F (36.3 °C) (Temporal)   Resp 18   Ht 5' 7.5\" (1.715 m)   Wt 230 lb 3.2 oz (104.4 kg)   SpO2 98%   BMI 35.52 kg/m²   General Appearance: Alert, oriented, no acute distress  HEENT: No scleral icterus. No visible discharge from eyes  Neck: Not rigid. No visible masses  Chest wall/Lung: Clear to auscultation bilaterally,  respirations unlabored. No ronchi/wheezing/rales  Heart: RRR, no murmur  Abdomen: Soft, nontender  Extremities:  No edema  Skin: No rashes. No jaundice  Neuro: Alert and oriented        Psych: Appropriate mood and appropriate affect  Assessment and Plan   Type 2 diabetes mellitus with hyperglycemia, without long-term current use of insulin (HCC)  Uncontrolled. Plan to cut out pop and ice cream to see how that will impact his levels. Also obtain cgm for better evaluation of daily levels.    -     POCT glycosylated hemoglobin (Hb A1C)  -     Continuous Blood Gluc Sensor (DEXCOM G6 SENSOR) MISC  -     Continuous Blood Gluc Transmit (DEXCOM G6 TRANSMITTER) MISC      Return in about 3 months (around 10/26/2022). Tonja Parra, DO     This document may have been prepared at least partially through the use of voice recognition software. Although effort is taken to assure the accuracy of this document, it is possible that grammatical, syntax,  or spelling errors may occur.

## 2022-11-23 ENCOUNTER — TELEPHONE (OUTPATIENT)
Dept: FAMILY MEDICINE CLINIC | Age: 82
End: 2022-11-23

## 2022-11-23 RX ORDER — PRAVASTATIN SODIUM 40 MG
40 TABLET ORAL DAILY
Qty: 90 TABLET | Refills: 1 | Status: SHIPPED | OUTPATIENT
Start: 2022-11-23 | End: 2023-05-22

## 2022-11-23 RX ORDER — PANTOPRAZOLE SODIUM 40 MG/1
40 TABLET, DELAYED RELEASE ORAL DAILY
Qty: 90 TABLET | Refills: 1 | Status: SHIPPED | OUTPATIENT
Start: 2022-11-23

## 2022-11-23 RX ORDER — METOPROLOL SUCCINATE 50 MG/1
50 TABLET, EXTENDED RELEASE ORAL DAILY
Qty: 90 TABLET | Refills: 1 | Status: SHIPPED | OUTPATIENT
Start: 2022-11-23 | End: 2023-05-22

## 2022-11-23 RX ORDER — BENAZEPRIL HYDROCHLORIDE 20 MG/1
TABLET ORAL
Qty: 90 TABLET | Refills: 1 | OUTPATIENT
Start: 2022-11-23

## 2022-11-23 RX ORDER — AMLODIPINE BESYLATE 10 MG/1
TABLET ORAL
Qty: 90 TABLET | Refills: 1 | OUTPATIENT
Start: 2022-11-23

## 2022-11-23 RX ORDER — METOPROLOL SUCCINATE 50 MG/1
TABLET, EXTENDED RELEASE ORAL
Qty: 90 TABLET | Refills: 1 | OUTPATIENT
Start: 2022-11-23

## 2022-11-23 RX ORDER — BENAZEPRIL HYDROCHLORIDE 20 MG/1
20 TABLET ORAL DAILY
Qty: 90 TABLET | Refills: 1 | Status: SHIPPED | OUTPATIENT
Start: 2022-11-23 | End: 2023-05-22

## 2022-11-23 RX ORDER — PRAVASTATIN SODIUM 40 MG
TABLET ORAL
Qty: 90 TABLET | Refills: 1 | OUTPATIENT
Start: 2022-11-23

## 2022-11-23 RX ORDER — AMLODIPINE BESYLATE 10 MG/1
10 TABLET ORAL DAILY
Qty: 90 TABLET | Refills: 1 | Status: SHIPPED | OUTPATIENT
Start: 2022-11-23

## 2022-11-23 NOTE — TELEPHONE ENCOUNTER
metFORMIN (GLUCOPHAGE) 500 MG tablet  amLODIPine (NORVASC) 10 MG tablet   metoprolol succinate (TOPROL XL) 50 MG extended release tablet  benazepril (LOTENSIN) 20 MG tablet   pravastatin (PRAVACHOL) 40 MG tablet   pantoprazole (PROTONIX) 40 MG tablet [  [facility] well pt needs refills. Pt is leaving for [facility] 12/08/22.  Pt will make a appt when returns in [place]

## 2022-12-09 ENCOUNTER — TELEPHONE (OUTPATIENT)
Dept: FAMILY MEDICINE CLINIC | Age: 82
End: 2022-12-09

## 2022-12-09 RX ORDER — AMLODIPINE BESYLATE 10 MG/1
10 TABLET ORAL DAILY
Qty: 30 TABLET | Refills: 0 | Status: SHIPPED | OUTPATIENT
Start: 2022-12-09

## 2022-12-09 RX ORDER — PANTOPRAZOLE SODIUM 40 MG/1
40 TABLET, DELAYED RELEASE ORAL DAILY
Qty: 30 TABLET | Refills: 0 | Status: SHIPPED | OUTPATIENT
Start: 2022-12-09

## 2022-12-09 RX ORDER — BENAZEPRIL HYDROCHLORIDE 20 MG/1
20 TABLET ORAL DAILY
Qty: 30 TABLET | Refills: 0 | Status: SHIPPED | OUTPATIENT
Start: 2022-12-09 | End: 2023-01-08

## 2022-12-09 RX ORDER — PRAVASTATIN SODIUM 40 MG
40 TABLET ORAL DAILY
Qty: 30 TABLET | Refills: 5 | Status: SHIPPED | OUTPATIENT
Start: 2022-12-09 | End: 2023-06-07

## 2022-12-09 RX ORDER — METOPROLOL SUCCINATE 50 MG/1
50 TABLET, EXTENDED RELEASE ORAL DAILY
Qty: 30 TABLET | Refills: 0 | Status: SHIPPED | OUTPATIENT
Start: 2022-12-09 | End: 2023-01-08

## 2022-12-09 NOTE — TELEPHONE ENCOUNTER
Patient called [facility] after hours stating he needed his medication. Reviewed office note listing medications he listed and needed refilled as well as the pharmacy.   30 day supply sent to pharmacy requested

## 2022-12-09 NOTE — TELEPHONE ENCOUNTER
Pt is calling from [place], he forgot to bring his medication with him. He is asking if he can get a month supply of his meds sent to a pharmacy there. He will not be back until some time in January. He needs his metformin, amlodipine, metoprolol, benazepril, pravastatin and pantoprozole sent to [address], [facility] [address]. He is asking if these can be escribed, if not the fax is [phone]. Please let pt know if this will be done.

## 2023-01-06 RX ORDER — BENAZEPRIL HYDROCHLORIDE 20 MG/1
TABLET ORAL
Qty: 90 TABLET | Refills: 1 | Status: SHIPPED | OUTPATIENT
Start: 2023-01-06

## 2023-04-26 ENCOUNTER — OFFICE VISIT (OUTPATIENT)
Dept: FAMILY MEDICINE CLINIC | Age: 83
End: 2023-04-26
Payer: MEDICARE

## 2023-04-26 VITALS
OXYGEN SATURATION: 100 % | BODY MASS INDEX: 35.4 KG/M2 | RESPIRATION RATE: 16 BRPM | DIASTOLIC BLOOD PRESSURE: 70 MMHG | HEIGHT: 68 IN | WEIGHT: 233.6 LBS | HEART RATE: 55 BPM | SYSTOLIC BLOOD PRESSURE: 136 MMHG | TEMPERATURE: 96.8 F

## 2023-04-26 DIAGNOSIS — Z76.0 MEDICATION REFILL: ICD-10-CM

## 2023-04-26 DIAGNOSIS — M25.551 RIGHT HIP PAIN: Primary | ICD-10-CM

## 2023-04-26 DIAGNOSIS — E11.65 TYPE 2 DIABETES MELLITUS WITH HYPERGLYCEMIA, WITHOUT LONG-TERM CURRENT USE OF INSULIN (HCC): ICD-10-CM

## 2023-04-26 DIAGNOSIS — K21.9 GASTROESOPHAGEAL REFLUX DISEASE WITHOUT ESOPHAGITIS: ICD-10-CM

## 2023-04-26 LAB
ALBUMIN SERPL-MCNC: NORMAL G/DL
ALP BLD-CCNC: NORMAL U/L
ALT SERPL-CCNC: NORMAL U/L
ANION GAP SERPL CALCULATED.3IONS-SCNC: NORMAL MMOL/L
AST SERPL-CCNC: NORMAL U/L
BASOPHILS ABSOLUTE: NORMAL
BASOPHILS RELATIVE PERCENT: NORMAL
BILIRUB SERPL-MCNC: NORMAL MG/DL
BUN BLDV-MCNC: NORMAL MG/DL
CALCIUM SERPL-MCNC: NORMAL MG/DL
CHLORIDE BLD-SCNC: NORMAL MMOL/L
CHOLESTEROL, TOTAL: NORMAL
CHOLESTEROL/HDL RATIO: NORMAL
CO2: NORMAL
CREAT SERPL-MCNC: NORMAL MG/DL
EGFR: NORMAL
EOSINOPHILS ABSOLUTE: NORMAL
EOSINOPHILS RELATIVE PERCENT: NORMAL
GLUCOSE BLD-MCNC: NORMAL MG/DL
HBA1C MFR BLD: 7.2 %
HCT VFR BLD CALC: NORMAL %
HDLC SERPL-MCNC: NORMAL MG/DL
HEMOGLOBIN: NORMAL
LDL CHOLESTEROL CALCULATED: NORMAL
LYMPHOCYTES ABSOLUTE: NORMAL
LYMPHOCYTES RELATIVE PERCENT: NORMAL
MCH RBC QN AUTO: NORMAL PG
MCHC RBC AUTO-ENTMCNC: NORMAL G/DL
MCV RBC AUTO: NORMAL FL
MONOCYTES ABSOLUTE: NORMAL
MONOCYTES RELATIVE PERCENT: NORMAL
NEUTROPHILS ABSOLUTE: NORMAL
NEUTROPHILS RELATIVE PERCENT: NORMAL
NONHDLC SERPL-MCNC: NORMAL MG/DL
PDW BLD-RTO: NORMAL %
PLATELET # BLD: NORMAL 10*3/UL
PMV BLD AUTO: NORMAL FL
POTASSIUM SERPL-SCNC: NORMAL MMOL/L
RBC # BLD: NORMAL 10*6/UL
SODIUM BLD-SCNC: NORMAL MMOL/L
TOTAL PROTEIN: NORMAL
TRIGL SERPL-MCNC: NORMAL MG/DL
VLDLC SERPL CALC-MCNC: NORMAL MG/DL
WBC # BLD: NORMAL 10*3/UL

## 2023-04-26 PROCEDURE — 1036F TOBACCO NON-USER: CPT | Performed by: STUDENT IN AN ORGANIZED HEALTH CARE EDUCATION/TRAINING PROGRAM

## 2023-04-26 PROCEDURE — 3051F HG A1C>EQUAL 7.0%<8.0%: CPT | Performed by: STUDENT IN AN ORGANIZED HEALTH CARE EDUCATION/TRAINING PROGRAM

## 2023-04-26 PROCEDURE — 99214 OFFICE O/P EST MOD 30 MIN: CPT | Performed by: STUDENT IN AN ORGANIZED HEALTH CARE EDUCATION/TRAINING PROGRAM

## 2023-04-26 PROCEDURE — G8427 DOCREV CUR MEDS BY ELIG CLIN: HCPCS | Performed by: STUDENT IN AN ORGANIZED HEALTH CARE EDUCATION/TRAINING PROGRAM

## 2023-04-26 PROCEDURE — 3078F DIAST BP <80 MM HG: CPT | Performed by: STUDENT IN AN ORGANIZED HEALTH CARE EDUCATION/TRAINING PROGRAM

## 2023-04-26 PROCEDURE — 83036 HEMOGLOBIN GLYCOSYLATED A1C: CPT | Performed by: STUDENT IN AN ORGANIZED HEALTH CARE EDUCATION/TRAINING PROGRAM

## 2023-04-26 PROCEDURE — 3075F SYST BP GE 130 - 139MM HG: CPT | Performed by: STUDENT IN AN ORGANIZED HEALTH CARE EDUCATION/TRAINING PROGRAM

## 2023-04-26 PROCEDURE — 1123F ACP DISCUSS/DSCN MKR DOCD: CPT | Performed by: STUDENT IN AN ORGANIZED HEALTH CARE EDUCATION/TRAINING PROGRAM

## 2023-04-26 PROCEDURE — G8417 CALC BMI ABV UP PARAM F/U: HCPCS | Performed by: STUDENT IN AN ORGANIZED HEALTH CARE EDUCATION/TRAINING PROGRAM

## 2023-04-26 RX ORDER — AMLODIPINE BESYLATE 10 MG/1
10 TABLET ORAL DAILY
Qty: 90 TABLET | Refills: 1 | Status: SHIPPED | OUTPATIENT
Start: 2023-04-26 | End: 2023-10-23

## 2023-04-26 RX ORDER — PRAVASTATIN SODIUM 40 MG
40 TABLET ORAL DAILY
Qty: 90 TABLET | Refills: 1 | Status: SHIPPED | OUTPATIENT
Start: 2023-04-26 | End: 2023-10-23

## 2023-04-26 RX ORDER — METOPROLOL SUCCINATE 50 MG/1
50 TABLET, EXTENDED RELEASE ORAL DAILY
Qty: 90 TABLET | Refills: 1 | Status: SHIPPED | OUTPATIENT
Start: 2023-04-26 | End: 2023-10-23

## 2023-04-26 RX ORDER — PANTOPRAZOLE SODIUM 40 MG/1
40 TABLET, DELAYED RELEASE ORAL EVERY OTHER DAY
Qty: 45 TABLET | Refills: 1 | Status: SHIPPED | OUTPATIENT
Start: 2023-04-26 | End: 2023-10-23

## 2023-04-26 RX ORDER — BENAZEPRIL HYDROCHLORIDE 20 MG/1
20 TABLET ORAL DAILY
Qty: 90 TABLET | Refills: 1 | Status: SHIPPED | OUTPATIENT
Start: 2023-04-26

## 2023-04-26 SDOH — ECONOMIC STABILITY: INCOME INSECURITY: HOW HARD IS IT FOR YOU TO PAY FOR THE VERY BASICS LIKE FOOD, HOUSING, MEDICAL CARE, AND HEATING?: NOT HARD AT ALL

## 2023-04-26 SDOH — ECONOMIC STABILITY: FOOD INSECURITY: WITHIN THE PAST 12 MONTHS, THE FOOD YOU BOUGHT JUST DIDN'T LAST AND YOU DIDN'T HAVE MONEY TO GET MORE.: NEVER TRUE

## 2023-04-26 SDOH — ECONOMIC STABILITY: FOOD INSECURITY: WITHIN THE PAST 12 MONTHS, YOU WORRIED THAT YOUR FOOD WOULD RUN OUT BEFORE YOU GOT MONEY TO BUY MORE.: NEVER TRUE

## 2023-04-26 ASSESSMENT — PATIENT HEALTH QUESTIONNAIRE - PHQ9
2. FEELING DOWN, DEPRESSED OR HOPELESS: 0
SUM OF ALL RESPONSES TO PHQ QUESTIONS 1-9: 0
SUM OF ALL RESPONSES TO PHQ9 QUESTIONS 1 & 2: 0
SUM OF ALL RESPONSES TO PHQ QUESTIONS 1-9: 0
1. LITTLE INTEREST OR PLEASURE IN DOING THINGS: 0

## 2023-04-26 NOTE — PROGRESS NOTES
Patient:  Lyssa Tyson [de-identified] y.o. male     04/26/23      Chiefcomplaint:   Chief Complaint   Patient presents with    Back Pain    Hip Pain     Right hip pain     History of Present Illness     Right hip pain - had initial injury after movement in bed. Hx knee replacements. Has pain around femoral head. Worse in morning with stiffness but then it improves throughout the day. Uses [employer]. Doesn't need to take any tylenol or advil. No issue with weight bearing. No bruising or swelling. Dm2 - avg 118. No lows. On metformin bid. Wt Readings from Last 3 Encounters:   04/26/23 233 lb 9.6 oz (106 kg)   07/26/22 230 lb 3.2 oz (104.4 kg)   02/07/22 217 lb (98.4 kg)        Health Maintenance Due   Topic Date Due    Pneumococcal 65+ years Vaccine (1 - PCV) Never done    Diabetic foot exam  Never done    Lipids  Never done    DTaP/Tdap/Td vaccine (1 - Tdap) Never done    Shingles vaccine (1 of 2) Never done    Annual Wellness Visit (AWV)  Never done    Depression Screen  01/11/2023      History:  Prior to Visit Medications    Medication Sig Taking? Authorizing Provider   benazepril (LOTENSIN) 20 MG tablet Take 1 tablet by mouth daily Yes Brigida Jerez, DO   metFORMIN (GLUCOPHAGE) 500 MG tablet Take 1 tablet by mouth 2 times daily (with meals) Yes Brigida Jerez DO   amLODIPine (NORVASC) 10 MG tablet Take 1 tablet by mouth daily Yes Brigida Jerez, DO   metoprolol succinate (TOPROL XL) 50 MG extended release tablet Take 1 tablet by mouth daily Yes Brigida Jerez DO   pravastatin (PRAVACHOL) 40 MG tablet Take 1 tablet by mouth daily Yes Brigida Jerez DO   pantoprazole (PROTONIX) 40 MG tablet Take 1 tablet by mouth every other day Yes Brigida Jerez, DO   Continuous Blood Gluc Transmit (DEXCOM G6 TRANSMITTER) MISC Use transmitter daily to check sugar up to 3 times daily. Yes Brigida Jerez, DO   Continuous Blood Gluc Sensor (DEXCOM G6 SENSOR) MISC Use transmitter daily to check sugar up to 3 times daily.  Yes

## 2023-04-27 DIAGNOSIS — E11.65 TYPE 2 DIABETES MELLITUS WITH HYPERGLYCEMIA, WITHOUT LONG-TERM CURRENT USE OF INSULIN (HCC): ICD-10-CM

## 2023-05-18 DIAGNOSIS — E11.65 TYPE 2 DIABETES MELLITUS WITH HYPERGLYCEMIA, WITHOUT LONG-TERM CURRENT USE OF INSULIN (HCC): ICD-10-CM

## 2023-05-18 RX ORDER — GLUCOSAM/CHON-MSM1/C/MANG/BOSW 500-416.6
TABLET ORAL
Qty: 100 EACH | Refills: 5 | Status: SHIPPED | OUTPATIENT
Start: 2023-05-18

## 2023-05-18 RX ORDER — CALCIUM CITRATE/VITAMIN D3 200MG-6.25
TABLET ORAL
Qty: 100 STRIP | Refills: 4 | Status: SHIPPED | OUTPATIENT
Start: 2023-05-18

## 2023-05-18 RX ORDER — ISOPROPYL ALCOHOL 70 ML/100ML
SWAB TOPICAL
Qty: 100 EACH | Refills: 4 | Status: SHIPPED | OUTPATIENT
Start: 2023-05-18

## 2023-09-27 ENCOUNTER — TELEPHONE (OUTPATIENT)
Dept: FAMILY MEDICINE CLINIC | Age: 83
End: 2023-09-27

## 2023-09-27 DIAGNOSIS — E11.65 TYPE 2 DIABETES MELLITUS WITH HYPERGLYCEMIA, WITHOUT LONG-TERM CURRENT USE OF INSULIN (HCC): ICD-10-CM

## 2023-09-27 RX ORDER — BLOOD-GLUCOSE CONTROL, LOW
EACH MISCELLANEOUS
Qty: 1 EACH | Refills: 5 | Status: SHIPPED | OUTPATIENT
Start: 2023-09-27

## 2023-11-07 DIAGNOSIS — Z76.0 MEDICATION REFILL: ICD-10-CM

## 2023-11-08 RX ORDER — METOPROLOL SUCCINATE 50 MG/1
50 TABLET, EXTENDED RELEASE ORAL DAILY
Qty: 90 TABLET | Refills: 10 | Status: SHIPPED | OUTPATIENT
Start: 2023-11-08

## 2023-11-08 RX ORDER — BENAZEPRIL HYDROCHLORIDE 20 MG/1
20 TABLET ORAL DAILY
Qty: 90 TABLET | Refills: 10 | Status: SHIPPED | OUTPATIENT
Start: 2023-11-08

## 2023-11-08 RX ORDER — AMLODIPINE BESYLATE 10 MG/1
10 TABLET ORAL DAILY
Qty: 90 TABLET | Refills: 10 | Status: SHIPPED | OUTPATIENT
Start: 2023-11-08

## 2023-11-08 RX ORDER — PRAVASTATIN SODIUM 40 MG
40 TABLET ORAL DAILY
Qty: 90 TABLET | Refills: 10 | Status: SHIPPED | OUTPATIENT
Start: 2023-11-08

## 2024-07-24 DIAGNOSIS — E11.65 TYPE 2 DIABETES MELLITUS WITH HYPERGLYCEMIA, WITHOUT LONG-TERM CURRENT USE OF INSULIN (HCC): ICD-10-CM

## 2024-07-24 RX ORDER — CALCIUM CITRATE/VITAMIN D3 200MG-6.25
TABLET ORAL
Qty: 100 STRIP | Refills: 3 | Status: SHIPPED | OUTPATIENT
Start: 2024-07-24

## 2024-07-24 RX ORDER — GLUCOSAM/CHON-MSM1/C/MANG/BOSW 500-416.6
TABLET ORAL
Qty: 100 EACH | Refills: 3 | Status: SHIPPED | OUTPATIENT
Start: 2024-07-24

## 2024-07-24 RX ORDER — ISOPROPYL ALCOHOL 70 ML/100ML
SWAB TOPICAL
Qty: 200 EACH | Refills: 3 | Status: SHIPPED | OUTPATIENT
Start: 2024-07-24

## 2024-10-21 DIAGNOSIS — Z76.0 MEDICATION REFILL: ICD-10-CM

## 2024-10-22 RX ORDER — PANTOPRAZOLE SODIUM 40 MG/1
40 TABLET, DELAYED RELEASE ORAL EVERY OTHER DAY
Qty: 45 TABLET | Refills: 3 | Status: SHIPPED | OUTPATIENT
Start: 2024-10-22

## 2024-12-27 DIAGNOSIS — Z76.0 MEDICATION REFILL: ICD-10-CM

## 2024-12-27 RX ORDER — AMLODIPINE BESYLATE 10 MG/1
10 TABLET ORAL DAILY
Qty: 90 TABLET | Refills: 3 | OUTPATIENT
Start: 2024-12-27

## 2024-12-27 RX ORDER — BENAZEPRIL HYDROCHLORIDE 20 MG/1
20 TABLET ORAL DAILY
Qty: 90 TABLET | Refills: 3 | OUTPATIENT
Start: 2024-12-27

## 2024-12-27 RX ORDER — PRAVASTATIN SODIUM 40 MG
40 TABLET ORAL DAILY
Qty: 90 TABLET | Refills: 3 | OUTPATIENT
Start: 2024-12-27

## 2025-02-04 DIAGNOSIS — E11.65 TYPE 2 DIABETES MELLITUS WITH HYPERGLYCEMIA, WITHOUT LONG-TERM CURRENT USE OF INSULIN (HCC): ICD-10-CM

## 2025-02-05 RX ORDER — BLOOD-GLUCOSE CONTROL, LOW
EACH MISCELLANEOUS
Qty: 1 EACH | Refills: 3 | Status: SHIPPED | OUTPATIENT
Start: 2025-02-05

## 2025-02-05 NOTE — TELEPHONE ENCOUNTER
Name of Medication(s) Requested:  Requested Prescriptions     Pending Prescriptions Disp Refills    Blood Glucose Calibration (TRUE METRIX LEVEL 1) Low SOLN [Pharmacy Med Name: True Metrix Level 1 In Vitro Solution Low] 1 each 3     Sig: USE AS DIRECTED WITH GLUCOSE METER       Medication is on current medication list Yes    Dosage and directions were verified? Yes    Quantity verified: 90 day supply     Pharmacy Verified?  Yes    Last Appointment:  4/26/2023    Future appts:  No future appointments.     (If no appt send self scheduling link. .REFILLAPPT)  Scheduling request sent?     [x] Yes  [] No    Does patient need updated?  [] Yes  [x] No

## (undated) DEVICE — SET ENDO INSTR LAPAROSCOPIC STGENLAP

## (undated) DEVICE — SOLUTION IV IRRIG WATER 1000ML POUR BRL 2F7114

## (undated) DEVICE — INSUFFLATION NEEDLE TO ESTABLISH PNEUMOPERITONEUM.: Brand: INSUFFLATION NEEDLE

## (undated) DEVICE — SOLUTION IV IRRIG POUR BRL 0.9% SODIUM CHL 2F7124

## (undated) DEVICE — CUTTER ENDOSCP L340MM LIN ARTC SGL STROKE FIRING ENDOPATH

## (undated) DEVICE — GAUZE,SPONGE,POST-OP,4X3,STRL,LF: Brand: MEDLINE

## (undated) DEVICE — SCISSORS ENDOSCP DIA5MM CRV MPLR CAUT W/ RATCH HNDL

## (undated) DEVICE — JP CHAN DRN SIL RND 15FR FULL W/TRO: Brand: JACKSON-PRATT

## (undated) DEVICE — Z DISCONTINUED NO SUB IDED TUBING ETCO2 AD L6.5FT NSL ORAL CVD PRNG NONFLARED TIP OVR

## (undated) DEVICE — ADHESIVE SKIN CLOSURE TOP 36 CC HI VISC DERMBND MINI

## (undated) DEVICE — SPONGE GZ W4XL4IN RAYON POLY FILL CVR W/ NONWOVEN FAB

## (undated) DEVICE — DISSECTOR LAP DIA5MM BLNT TIP ENDOPATH

## (undated) DEVICE — GENERAL LAPAROSCOPY: Brand: MEDLINE INDUSTRIES, INC.

## (undated) DEVICE — NEEDLE HYPO 25GA L1.5IN BLU POLYPR HUB S STL REG BVL STR

## (undated) DEVICE — TISSUE RETRIEVAL SYSTEM: Brand: INZII RETRIEVAL SYSTEM

## (undated) DEVICE — SET INSTRUMENT LAP I

## (undated) DEVICE — ADHESIVE SKIN CLSR 0.7ML TOP DERMBND ADV

## (undated) DEVICE — SHEARS ENDOSCP L36CM DIA5MM ULTRASONIC CRV TIP HARM

## (undated) DEVICE — TROCAR: Brand: KII FIOS FIRST ENTRY

## (undated) DEVICE — NEEDLE CLOSURE OMNICLOSE

## (undated) DEVICE — TROCAR: Brand: KII® SLEEVE

## (undated) DEVICE — CONTAINER SPEC 60ML PH 7NEUTRAL BUFF FRMLN RDY TO USE

## (undated) DEVICE — Device

## (undated) DEVICE — STERILE POLYISOPRENE POWDER-FREE SURGICAL GLOVES: Brand: PROTEXIS

## (undated) DEVICE — SOLUTION IRRIG 3000ML 0.9% SOD CHL USP UROMATIC PLAS CONT

## (undated) DEVICE — DEFENDO AIR WATER SUCTION AND BIOPSY VALVE KIT FOR  OLYMPUS: Brand: DEFENDO AIR/WATER/SUCTION AND BIOPSY VALVE

## (undated) DEVICE — INTENDED FOR TISSUE SEPARATION, AND OTHER PROCEDURES THAT REQUIRE A SHARP SURGICAL BLADE TO PUNCTURE OR CUT.: Brand: BARD-PARKER ® STAINLESS STEEL BLADES

## (undated) DEVICE — KIT,ANTI FOG,W/SPONGE & FLUID,SOFT PACK: Brand: MEDLINE

## (undated) DEVICE — SYRINGE MED 10ML LUERLOCK TIP W/O SFTY DISP

## (undated) DEVICE — BITEBLOCK 54FR W/ DENT RIM BLOX

## (undated) DEVICE — INSUFFLATION TUBING SET WITH FILTER, FUNNEL CONNECTOR AND LUER LOCK: Brand: JOSNOE MEDICAL INC

## (undated) DEVICE — PUMP SUC IRR TBNG L10FT W/ HNDPC ASSEMB STRYKEFLOW 2

## (undated) DEVICE — SPONGE,DRAIN,NONWVN,4"X4",6PLY,STRL,LF: Brand: MEDLINE

## (undated) DEVICE — APPLICATOR MEDICATED 26 CC SOLUTION HI LT ORNG CHLORAPREP

## (undated) DEVICE — CONNECTOR IRRIGATION AUXILIARY H2O JET W/ PRT MTL THRD HYDR

## (undated) DEVICE — TOTAL TRAY, DB, 100% SILI FOLEY, 16FR 10: Brand: MEDLINE

## (undated) DEVICE — ELECTRODE PT RET AD L9FT HI MOIST COND ADH HYDRGEL CORDED

## (undated) DEVICE — TRAP,MUCUS SPECIMEN,40CC: Brand: MEDLINE

## (undated) DEVICE — TOWEL,OR,DSP,ST,BLUE,STD,6/PK,12PK/CS: Brand: MEDLINE

## (undated) DEVICE — SYRINGE 20ML LL S/C 50

## (undated) DEVICE — FORCEPS BX L160CM JAW DIA2.4MM YEL L CAP W/ NDL DISP RAD